# Patient Record
Sex: FEMALE | Race: WHITE | NOT HISPANIC OR LATINO | Employment: OTHER | URBAN - METROPOLITAN AREA
[De-identification: names, ages, dates, MRNs, and addresses within clinical notes are randomized per-mention and may not be internally consistent; named-entity substitution may affect disease eponyms.]

---

## 2017-04-06 ENCOUNTER — CONVERSION ENCOUNTER (OUTPATIENT)
Dept: MAMMOGRAPHY | Facility: CLINIC | Age: 77
End: 2017-04-06

## 2017-04-06 ENCOUNTER — GENERIC CONVERSION - ENCOUNTER (OUTPATIENT)
Dept: OTHER | Facility: OTHER | Age: 77
End: 2017-04-06

## 2017-04-06 DIAGNOSIS — Z12.31 ENCOUNTER FOR SCREENING MAMMOGRAM FOR MALIGNANT NEOPLASM OF BREAST: ICD-10-CM

## 2017-04-25 ENCOUNTER — GENERIC CONVERSION - ENCOUNTER (OUTPATIENT)
Dept: OTHER | Facility: OTHER | Age: 77
End: 2017-04-25

## 2017-06-02 ENCOUNTER — ALLSCRIPTS OFFICE VISIT (OUTPATIENT)
Dept: OTHER | Facility: OTHER | Age: 77
End: 2017-06-02

## 2017-06-02 DIAGNOSIS — R92.8 OTHER ABNORMAL AND INCONCLUSIVE FINDINGS ON DIAGNOSTIC IMAGING OF BREAST: ICD-10-CM

## 2017-06-02 DIAGNOSIS — E04.1 NONTOXIC SINGLE THYROID NODULE: ICD-10-CM

## 2017-06-02 DIAGNOSIS — Z12.31 ENCOUNTER FOR SCREENING MAMMOGRAM FOR MALIGNANT NEOPLASM OF BREAST: ICD-10-CM

## 2017-06-07 ENCOUNTER — GENERIC CONVERSION - ENCOUNTER (OUTPATIENT)
Dept: OTHER | Facility: OTHER | Age: 77
End: 2017-06-07

## 2017-10-16 ENCOUNTER — ALLSCRIPTS OFFICE VISIT (OUTPATIENT)
Dept: OTHER | Facility: OTHER | Age: 77
End: 2017-10-16

## 2017-10-17 NOTE — PROGRESS NOTES
Assessment  1  Primary osteoarthritis of both first carpometacarpal joints (715 14) (M18 0)    Plan  Primary osteoarthritis of both first carpometacarpal joints    · Follow-up visit in 3 months Evaluation and Treatment  Follow-up  Status: Hold For -  Scheduling  Requested for: 16ESG2040   · You may continue or resume your normal level of activity ; Status:Complete;   Done:  03WXP1134 02:10PM   · Joint Bursa Aspiration &/or Injection Small - POC; Status:Complete;   Done: 19ZRR2263  02:10PM   · Joint Bursa Aspiration &/or Injection Small - POC; Status:Complete;   Done: 50VIY3999  02:10PM    Discussion/Summary    Assessment: bilateral thumb CMC arthritis  Bilateral thumb CMC joint osteoarthritis  anatomy and physiology of carpometacarpal joint arthritis was discussed with the patient today in the office  Deterioration of the articular cartilage eventually leads to hypermobility at the thumb [facility] joint, resulting in joint subluxation, osteophyte formation, cystic changes within the trapezium and base of the first metacarpal, as well as subchondral sclerosis  Eventually, pain, limited mobility, and compensatory hyperextension at the metacarpophalangeal joint may develop  While normal activity and usage of the thumb joint may provide a painful experience to the patient, this typically does not result in damage to the thumb or hand  Treatment options include resting thumb spica splints to decreased joint edema, pain, and inflammation  Therapy exercises to strengthen the thenar musculature may relieve pain, but do not alter the overall continued development of osteoarthritis  Oral medications, topical medications, corticosteroid injections may decrease pain and increase overall function  Eventually, approximately 5% of patients may require surgical intervention  Patient has elected injection today  It was performed today without complication  Continue conservative management     the patient, site, and procedure were identified and after discussion of risks and benefits, verbal consent was obtained  The [site] was prepped in a sterile fashion and injected with a combination of 1% lidocaine without epinephrine and 6 mg of Celestone  Sterile dressings were then applied  The patient tolerated the procedure well         note was dictated with dictation software  As such, and may contain typographical errors, improperly dictated words, background noise, or other errors  patient presents evaluation bilateral thumb CMC joint osteoarthritis mild to moderate in severity with pain made worse with activity gripping twisting turning  No fevers chills constitutional symptoms  She has received previous cortisone injections in the past and they have helped  past medical history, surgical history, family history, social history, medications, allergies, and review of systems have been read and reviewed on the chart and have been updated  Review of Systems was recorded in the office today on a separate evaluation sheet and is listed below  of Systems:NegativeNegativeNegativeNegativeNegativeNegativeNegative except as aboveAs aboveNegative except as aboveNegativeNegative    / Psych: Awake and Oriented, No acute distress, age appropriateNormocephalic, atraumatic, mucous membranes moist, neck supple, trachea midline  No rebound or signs of guardingNo discernible arrhythmia, 2+ pulses with good capillary refillNo audible wheezing or audible stridor[The patient is neurovascularly intact in the median, ulnar, and radial nerve distribution  There is normal sensation and good capillary refill within the digits  2+ pulses  ][No lesions, rashes, streaking, purulence, abscesses, lymphangitis]Positive shock, grind, circumduction test  Tenderness to palpation over the thumb CMC joint  Minimal hyperextension metacarpophalangeal joint  Studies: [none]         Chief Complaint  1  Hand Problem    Active Problems  1  Abnormal mammogram (793 80) (R92 8)   2   Breast self examination education, encounter for (V65 49) (Z71 89)   3  Encounter for screening mammogram for malignant neoplasm of breast (V76 12)   (Z12 31)   4  Glaucoma (365 9) (H40 9)   5  Menopausal state (627 2) (N95 1)   6  Menopausal symptoms (627 2) (N95 1)   7  Thyroid nodule (241 0) (E04 1)    Past Medical History   · Breast self examination education, encounter for (V65 49) (Z71 89)   · History of Summary Of Previous Pregnancies  4  (Total No )   · History of Summary Of Previous Pregnancies Para 3  (Deliveries)    Surgical History   · History of Breast Surgery Enlargement Procedure   · History of Cervix Cryosurgery   · History of Colposcopy Cervix With Biopsy(S) With Endocervical Curettage   · History of Corneal LASIK   · History of Dilation And Curettage   · History of Endometrial Biopsy By Suction   · History of Endometrial Biopsy By Suction   · History of Foot Surgery   · History of Hysteroscopy With Resection For Intrauterine Polyp Removal   · History of Tonsillectomy    Family History   · No pertinent family history   · Family history of Hypertension (V17 49)   · Family history of Arthritis    Social History   · Denied: History of Alcohol use   · Caffeine Use   · Denied: History of Drug Use   · Marital History - Currently    · Never A Smoker   · [employer] Aff[employer]   · Three children   · Uses Safety Equipment - Seatbelts    Current Meds   1  Alpha Lipoic Acid 300 MG CAPS; Therapy: (Recorded:2015) to Recorded   2  CoQ-10 CAPS; Therapy: (Recorded:17Csk5381) to Recorded   3  Lexapro 5 MG Oral Tablet (Escitalopram Oxalate); Therapy: (Recorded:2017) to Recorded   4  Turmeric Curcumin Oral Capsule; Therapy: (Recorded:2017) to Recorded   5  Vitamin C TABS; Therapy: (Recorded:64Vvf8404) to Recorded   6  Vitamin D-3 TABS; Therapy: (Recorded:75Wew7610) to Recorded    Allergies  1   No Known Drug Allergies    Vitals  Signs   Systolic: 032  Diastolic: 72  Height: 5 ft 2 5 in  Weight: 117 lb 6 oz  BMI Calculated: 21 13  BSA Calculated: 1 53    Signatures   Electronically signed by : CHINEDU Robles ; Oct 16 2017  2:10PM EST                       (Author)

## 2017-12-05 ENCOUNTER — CONVERSION ENCOUNTER (OUTPATIENT)
Dept: MAMMOGRAPHY | Facility: CLINIC | Age: 77
End: 2017-12-05

## 2017-12-05 ENCOUNTER — GENERIC CONVERSION - ENCOUNTER (OUTPATIENT)
Dept: OBGYN CLINIC | Facility: CLINIC | Age: 77
End: 2017-12-05

## 2017-12-08 ENCOUNTER — GENERIC CONVERSION - ENCOUNTER (OUTPATIENT)
Dept: OTHER | Facility: OTHER | Age: 77
End: 2017-12-08

## 2018-01-10 NOTE — CONSULTS
Chief Complaint    1  Hand Problem    Active Problems    1  Abnormal mammogram (793 80) (R92 8)   2  Breast self examination education, encounter for (V65 49) (Z71 89)   3  Encounter for screening mammogram for malignant neoplasm of breast (V76 12)   (Z12 31)   4  Glaucoma (365 9) (H40 9)   5  Menopausal state (627 2) (N95 1)   6  Menopausal symptoms (627 2) (N95 1)   7  Thyroid nodule (241 0) (E04 1)    Past Medical History    · Breast self examination education, encounter for (V65 49) (Z71 89)   · History of Summary Of Previous Pregnancies  4  (Total No )   · History of Summary Of Previous Pregnancies Para 3  (Deliveries)    Surgical History    · History of Breast Surgery Enlargement Procedure   · History of Cervix Cryosurgery   · History of Colposcopy Cervix With Biopsy(S) With Endocervical Curettage   · History of Corneal LASIK   · History of Dilation And Curettage   · History of Endometrial Biopsy By Suction   · History of Endometrial Biopsy By Suction   · History of Foot Surgery   · History of Hysteroscopy With Resection For Intrauterine Polyp Removal   · History of Tonsillectomy    Family History    · No pertinent family history    · Family history of Hypertension (V17 49)    · Family history of Arthritis    Social History    · Denied: History of Alcohol use   · Caffeine Use   · Denied: History of Drug Use   · Marital History - Currently    · Never A Smoker   · Evangel[employer] Affiliation [employer]   · Three children   · Uses Safety Equipment - Seatbelts    Current Meds   1  Alpha Lipoic Acid 300 MG CAPS; Therapy: (Recorded:2015) to Recorded   2  CoQ-10 CAPS; Therapy: (Recorded:35Fhd8092) to Recorded   3  Lexapro 5 MG Oral Tablet (Escitalopram Oxalate); Therapy: (Recorded:2017) to Recorded   4  Turmeric Curcumin Oral Capsule; Therapy: (Recorded:2017) to Recorded   5  Vitamin C TABS; Therapy: (Recorded:47Clu7210) to Recorded   6  Vitamin D-3 TABS;    Therapy: (Recorded:60Jat6167) to Recorded    Allergies    1  No Known Drug Allergies    Vitals  Signs    Systolic: 251JCCWWKFOD: 89PTPTOI: 5 ft 2 5 inWeight: 117 lb 6 ozBMI Calculated: 21 13BSA Calculated: 1 53    Assessment    1  Primary osteoarthritis of both first carpometacarpal joints (715 14) (M18 0)    Plan     Primary osteoarthritis of both first carpometacarpal joints    · Follow-up visit in 3 months Evaluation and Treatment  Follow-up  Status: Hold For -  Scheduling  Requested for: 32TKM2608   · You may continue or resume your normal level of activity ; Status:Complete;   Done:  19PUH7779 02:10PM   · Joint Bursa Aspiration &/or Injection Small - POC; Status:Complete;   Done: 13LGP1117  02:10PM   · Joint Bursa Aspiration &/or Injection Small - POC; Status:Complete;   Done: 73VKB6823  02:10PM    Discussion/Summary    Assessment: bilateral thumb CMC arthritis    Plan:   Assessment: Bilateral thumb CMC joint osteoarthritis    Plan:   The anatomy and physiology of carpometacarpal joint arthritis was discussed with the patient today in the office  Deterioration of the articular cartilage eventually leads to hypermobility at the thumb [facility] joint, resulting in joint subluxation, osteophyte formation, cystic changes within the trapezium and base of the first metacarpal, as well as subchondral sclerosis  Eventually, pain, limited mobility, and compensatory hyperextension at the metacarpophalangeal joint may develop  While normal activity and usage of the thumb joint may provide a painful experience to the patient, this typically does not result in damage to the thumb or hand  Treatment options include resting thumb spica splints to decreased joint edema, pain, and inflammation  Therapy exercises to strengthen the thenar musculature may relieve pain, but do not alter the overall continued development of osteoarthritis   Oral medications, topical medications, corticosteroid injections may decrease pain and increase overall function  Eventually, approximately 5% of patients may require surgical intervention  Patient has elected injection today  It was performed today without complication  Continue conservative management  After the patient, site, and procedure were identified and after discussion of risks and benefits, verbal consent was obtained  The [site] was prepped in a sterile fashion and injected with a combination of 1% lidocaine without epinephrine and 6 mg of Celestone  Sterile dressings were then applied  The patient tolerated the procedure well  This note was dictated with dictation software  As such, and may contain typographical errors, improperly dictated words, background noise, or other errors  HPI:  The patient presents evaluation bilateral thumb CMC joint osteoarthritis mild to moderate in severity with pain made worse with activity gripping twisting turning  No fevers chills constitutional symptoms  She has received previous cortisone injections in the past and they have helped  The past medical history, surgical history, family history, social history, medications, allergies, and review of systems have been read and reviewed on the chart and have been updated  Review of Systems was recorded in the office today on a separate evaluation sheet and is listed below  Review of Systems:  Constitutional: Negative  HEENT: Negative  Cardiovascular: Negative  Pulmonary: Negative  : Negative  GI: Negative  Skin: Negative except as above  Musculoskeletal: As above  Neurologic: Negative except as above  Endocrine: Negative  Psychiatric: Negative    Examination:    General / Psych: Awake and Oriented, No acute distress, age appropriate  HEENT: Normocephalic, atraumatic, mucous membranes moist, neck supple, trachea midline    Abdomen: No rebound or signs of guarding  Cardio: No discernible arrhythmia, 2+ pulses with good capillary refill  Pulmonary: No audible wheezing or audible stridor  Neurologic: Yudith Vargas patient is neurovascularly intact in the median, ulnar, and radial nerve distribution  There is normal sensation and good capillary refill within the digits  2+ pulses ]  Skin: [No lesions, rashes, streaking, purulence, abscesses, lymphangitis]  Musculoskeletal: Positive shock, grind, circumduction test  Tenderness to palpation over the thumb CMC joint   Minimal hyperextension metacarpophalangeal joint    Diagnostic Studies: [none]         Signatures   Electronically signed by : CHINEDU Garza ; Oct 16 2017  2:10PM EST                       (Author)

## 2018-01-10 NOTE — MISCELLANEOUS
Message   Recorded as Task   Date: 04/19/2016 12:31 PM, Created By: Bertin Huddleston   Task Name: Review Document   Assigned To: [facility]   Regarding Patient: Marlin Eid, Status: Active   Comment:    Bertin Flaquito - 19 Apr 2016 12:31 PM     TASK CREATED  labs are good, she does have borderline total cholesterol but her HDL is high, no treatment needed        Active Problems    1  Breast self examination education, encounter for (V65 49) (Z71 89)   2  Encounter for screening mammogram for malignant neoplasm of breast (V76 12)   (Z12 31)   3  Menopausal state (627 2) (N95 1)   4  Menopausal symptoms (627 2) (N95 1)    Current Meds   1  Alpha Lipoic Acid 300 MG CAPS; Therapy: (Recorded:02Mar2015) to Recorded   2  CoQ-10 CAPS; Therapy: (Recorded:77Jhk4205) to Recorded   3  [address] [facility]; Therapy: (Recorded:27Feb2014) to Recorded   4  Prempro 0 3-1 5 MG Oral Tablet; TAKE 1 TABLET DAILY AS DIRECTED; Therapy: 52UYV1541 to (Evaluate:27Mar2017)  Requested for: 01Apr2016; Last   Rx:01Apr2016 Ordered   5  Vitamin C TABS; Therapy: (Recorded:27Feb2014) to Recorded   6  Vitamin D-3 TABS; Therapy: (Recorded:27Feb2014) to Recorded    Allergies    1   No Known Drug Allergies    Signatures   Electronically signed by : [facility], ; Apr 20 2016  9:50AM EST                       (Author)

## 2018-01-11 NOTE — CONSULTS
Chief Complaint    1  Hand Problem    Active Problems    1  Abnormal mammogram (793 80) (R92 8)   2  Breast self examination education, encounter for (V65 49) (Z71 89)   3  Encounter for screening mammogram for malignant neoplasm of breast (V76 12)   (Z12 31)   4  Glaucoma (365 9) (H40 9)   5  Menopausal state (627 2) (N95 1)   6  Menopausal symptoms (627 2) (N95 1)   7  Thyroid nodule (241 0) (E04 1)    Past Medical History    1  Breast self examination education, encounter for (V65 49) (Z71 89)   2  History of Summary Of Previous Pregnancies  4  (Total No )   3  History of Summary Of Previous Pregnancies Para 3  (Deliveries)    Surgical History    1  History of Breast Surgery Enlargement Procedure   2  History of Cervix Cryosurgery   3  History of Colposcopy Cervix With Biopsy(S) With Endocervical Curettage   4  History of Corneal LASIK   5  History of Dilation And Curettage   6  History of Endometrial Biopsy By Suction   7  History of Endometrial Biopsy By Suction   8  History of Foot Surgery   9  History of Hysteroscopy With Resection For Intrauterine Polyp Removal   10  History of Tonsillectomy    Family History    1  No pertinent family history    2  Family history of Hypertension (V17 49)    3  Family history of Arthritis    Social History    · Denied: History of Alcohol use   · Caffeine Use   · Denied: History of Drug Use   · Marital History - Currently    · Never A Smoker   · [employer] Affiliation [employer]   · Three children   · Uses Safety Equipment - Seatbelts    Current Meds   1  Alpha Lipoic Acid 300 MG CAPS; Therapy: (Recorded:2015) to Recorded   2  CoQ-10 CAPS; Therapy: (Recorded:40Jie1642) to Recorded   3  Lexapro 5 MG Oral Tablet (Escitalopram Oxalate); Therapy: (Recorded:2017) to Recorded   4  Turmeric Curcumin Oral Capsule; Therapy: (Recorded:2017) to Recorded   5  Vitamin C TABS; Therapy: (Recorded:46Ygv4984) to Recorded   6   Vitamin D-3 TABS; Therapy: (Recorded:97Ywi0181) to Recorded    Allergies    1  No Known Drug Allergies    Vitals  Signs   Recorded: 82QYB1937 42:63UI   Systolic: 150  Diastolic: 72  Height: 5 ft 2 5 in  Weight: 117 lb 6 oz  BMI Calculated: 21 13  BSA Calculated: 1 53    Assessment    1  Primary osteoarthritis of both first carpometacarpal joints (715 14) (M18 0)    Plan  Primary osteoarthritis of both first carpometacarpal joints    1  Follow-up visit in 3 months Evaluation and Treatment  Follow-up  Status: Hold For -   Scheduling  Requested for: 48XKA6246   2  You may continue or resume your normal level of activity ; Status:Complete;   Done:   34OHB3132 02:10PM   3  Joint Bursa Aspiration &/or Injection Small - POC; Status:Complete;   Done: 00IDP5773   02:10PM   4  Joint Bursa Aspiration &/or Injection Small - POC; Status:Complete;   Done: 19NCU1468   02:10PM    Discussion/Summary  Discussion Summary:   Assessment: bilateral thumb CMC arthritis    Plan:   Assessment: Bilateral thumb CMC joint osteoarthritis    Plan:   The anatomy and physiology of carpometacarpal joint arthritis was discussed with the patient today in the office  Deterioration of the articular cartilage eventually leads to hypermobility at the thumb [facility] joint, resulting in joint subluxation, osteophyte formation, cystic changes within the trapezium and base of the first metacarpal, as well as subchondral sclerosis  Eventually, pain, limited mobility, and compensatory hyperextension at the metacarpophalangeal joint may develop  While normal activity and usage of the thumb joint may provide a painful experience to the patient, this typically does not result in damage to the thumb or hand  Treatment options include resting thumb spica splints to decreased joint edema, pain, and inflammation  Therapy exercises to strengthen the thenar musculature may relieve pain, but do not alter the overall continued development of osteoarthritis   Oral medications, topical medications, corticosteroid injections may decrease pain and increase overall function  Eventually, approximately 5% of patients may require surgical intervention  Patient has elected injection today  It was performed today without complication  Continue conservative management  After the patient, site, and procedure were identified and after discussion of risks and benefits, verbal consent was obtained  The [site] was prepped in a sterile fashion and injected with a combination of 1% lidocaine without epinephrine and 6 mg of Celestone  Sterile dressings were then applied  The patient tolerated the procedure well  This note was dictated with dictation software  As such, and may contain typographical errors, improperly dictated words, background noise, or other errors  HPI:  The patient presents evaluation bilateral thumb CMC joint osteoarthritis mild to moderate in severity with pain made worse with activity gripping twisting turning  No fevers chills constitutional symptoms  She has received previous cortisone injections in the past and they have helped  The past medical history, surgical history, family history, social history, medications, allergies, and review of systems have been read and reviewed on the chart and have been updated  Review of Systems was recorded in the office today on a separate evaluation sheet and is listed below  Review of Systems:  Constitutional: Negative  HEENT: Negative  Cardiovascular: Negative  Pulmonary: Negative  : Negative  GI: Negative  Skin: Negative except as above  Musculoskeletal: As above  Neurologic: Negative except as above  Endocrine: Negative  Psychiatric: Negative    Examination:    General / Psych: Awake and Oriented, No acute distress, age appropriate  HEENT: Normocephalic, atraumatic, mucous membranes moist, neck supple, trachea midline    Abdomen: No rebound or signs of guarding  Cardio: No discernible arrhythmia, 2+ pulses with good capillary refill  Pulmonary: No audible wheezing or audible stridor  Neurologic: [The patient is neurovascularly intact in the median, ulnar, and radial nerve distribution  There is normal sensation and good capillary refill within the digits  2+ pulses ]  Skin: [No lesions, rashes, streaking, purulence, abscesses, lymphangitis]  Musculoskeletal: Positive shock, grind, circumduction test  Tenderness to palpation over the thumb CMC joint   Minimal hyperextension metacarpophalangeal joint    Diagnostic Studies: [none]         Signatures   Electronically signed by : Glendale Apley, M D ; Oct 16 2017  2:11PM EST                       (Author)

## 2018-01-12 VITALS
SYSTOLIC BLOOD PRESSURE: 118 MMHG | BODY MASS INDEX: 20.93 KG/M2 | WEIGHT: 118.13 LBS | DIASTOLIC BLOOD PRESSURE: 62 MMHG | HEIGHT: 63 IN

## 2018-01-12 NOTE — MISCELLANEOUS
Message   Recorded as Task   Date: 04/10/2017 03:10 PM, Created By: Laurie Regalado   Task Name: Review Document   Assigned To: Laurie Regalado   Regarding Patient: Timo Luz, Status: In Progress   Comment:    Laurie Regalado - 10 Apr 2017 3:10 PM     TASK CREATED  pt needs additional views and US of the left breast   LalaEarlene - 11 Apr 2017 1:06 PM     TASK IN PROGRESS   LalaEarlene weston - 12 Apr 2017 9:42 AM     TASK REPLIED TO: Previously Assigned To Reese Sutton  pt has an appt 4/25/17  Jeanine Lightning Active Problems    1  Breast self examination education, encounter for (V65 49) (Z71 89)   2  Encounter for screening mammogram for malignant neoplasm of breast (V76 12)   (Z12 31)   3  Menopausal state (627 2) (N95 1)   4  Menopausal symptoms (627 2) (N95 1)    Current Meds   1  Alpha Lipoic Acid 300 MG CAPS; Therapy: (Recorded:02Mar2015) to Recorded   2  CoQ-10 CAPS; Therapy: (Recorded:36Tpr9992) to Recorded   3  [address]; Therapy: (Recorded:17Opd6687) to Recorded   4  Vitamin C TABS; Therapy: (Recorded:35Lxz5179) to Recorded   5  Vitamin D-3 TABS; Therapy: (Recorded:91Itz2923) to Recorded    Allergies    1  No Known Drug Allergies    Plan  Encounter for screening mammogram for malignant neoplasm of breast    · * MAMMO SCREENING BILATERAL W CAD; Status:Active;  Requested EQV:35GCQ2273;     Signatures   Electronically signed by : Deyanira Duarte, ; Apr 12 2017  9:42AM EST                       (Author)

## 2018-01-14 VITALS
WEIGHT: 117.38 LBS | DIASTOLIC BLOOD PRESSURE: 72 MMHG | HEIGHT: 63 IN | SYSTOLIC BLOOD PRESSURE: 112 MMHG | BODY MASS INDEX: 20.8 KG/M2

## 2018-01-15 NOTE — MISCELLANEOUS
Message   Recorded as Task   Date: 06/06/2017 09:35 PM, Created By: Kelsy Hernandez   Task Name: Review Document   Assigned To: Jerod Palacio   Regarding Patient: Hany Guerrero, Status: Active   Comment:    Kelsy Hernandez - 06 Jun 2017 9:35 PM     TASK CREATED  PT needs a dx mammogram of the left in 6 months, I think she has a rx for this but can you double check? Earlene Reeves - 07 Jun 2017 10:50 AM     TASK EDITED  mailed pt a script for her follow-up  [facility] Active Problems    1  Abnormal mammogram (793 80) (R92 8)   2  Breast self examination education, encounter for (V65 49) (Z71 89)   3  Encounter for screening mammogram for malignant neoplasm of breast (V76 12)   (Z12 31)   4  Glaucoma (365 9) (H40 9)   5  Menopausal state (627 2) (N95 1)   6  Menopausal symptoms (627 2) (N95 1)   7  Thyroid nodule (241 0) (E04 1)    Current Meds   1  Alpha Lipoic Acid 300 MG CAPS; Therapy: (Recorded:02Mar2015) to Recorded   2  CoQ-10 CAPS; Therapy: (Recorded:29Grf9717) to Recorded   3  Lexapro 5 MG Oral Tablet (Escitalopram Oxalate); Therapy: (Recorded:02Jun2017) to Recorded   4  Turmeric Curcumin Oral Capsule; Therapy: (Recorded:02Jun2017) to Recorded   5  Vitamin C TABS; Therapy: (Recorded:55Lgf0773) to Recorded   6  Vitamin D-3 TABS; Therapy: (Recorded:10Zlw6654) to Recorded    Allergies    1  No Known Drug Allergies    Plan  Abnormal mammogram    · MAMMO DIAGNOSTIC LEFT W CAD; Status:Hold For - Scheduling,Retrospective  Authorization;  Requested for:80Vzk7325;     Signatures   Electronically signed by : Brad Camargo, ; Jun 7 2017 10:50AM EST                       (Author)

## 2018-01-23 NOTE — MISCELLANEOUS
Message   Recorded as Task   Date: 12/06/2017 09:27 PM, Created By: Michelle Hand   Task Name: Review Document   Assigned To: Daniela Rocha   Regarding Patient: Markos Santana, Status: Active   CommentAlto Severs - 06 Dec 2017 9:27 PM     TASK CREATED  b/l dx mammogra in 5 months   Order mailed to patient  Active Problems    1  Abnormal mammogram (793 80) (R92 8)   2  Breast self examination education, encounter for (V65 49) (Z71 89)   3  Encounter for screening mammogram for malignant neoplasm of breast (V76 12)   (Z12 31)   4  Glaucoma (365 9) (H40 9)   5  Menopausal state (627 2) (N95 1)   6  Menopausal symptoms (627 2) (N95 1)   7  Primary osteoarthritis of both first carpometacarpal joints (715 14) (M18 0)   8  Thyroid nodule (241 0) (E04 1)    Current Meds   1  Alpha Lipoic Acid 300 MG CAPS; Therapy: (Recorded:02Mar2015) to Recorded   2  CoQ-10 CAPS; Therapy: (Recorded:78Hqx8758) to Recorded   3  Lexapro 5 MG Oral Tablet (Escitalopram Oxalate); Therapy: (Recorded:02Jun2017) to Recorded   4  Turmeric Curcumin Oral Capsule; Therapy: (Recorded:02Jun2017) to Recorded   5  Vitamin C TABS; Therapy: (Recorded:02Wlg0590) to Recorded   6  Vitamin D-3 TABS; Therapy: (Recorded:76Ijf6631) to Recorded    Allergies    1  No Known Drug Allergies    Plan  Abnormal mammogram    · MAMMO DIAGNOSTIC BILATERAL W CAD; Status:Hold For - Scheduling,Retrospective  Authorization;  Requested for:77Zdg0201;     Signatures   Electronically signed by : Frank Bailey, ; Dec  8 2017  9:19AM EST                       (Author)

## 2018-04-23 ENCOUNTER — OFFICE VISIT (OUTPATIENT)
Dept: OBGYN CLINIC | Facility: CLINIC | Age: 78
End: 2018-04-23
Payer: MEDICARE

## 2018-04-23 VITALS
DIASTOLIC BLOOD PRESSURE: 70 MMHG | HEIGHT: 63 IN | SYSTOLIC BLOOD PRESSURE: 107 MMHG | BODY MASS INDEX: 20.41 KG/M2 | WEIGHT: 115.2 LBS | HEART RATE: 85 BPM

## 2018-04-23 DIAGNOSIS — M18.0 PRIMARY OSTEOARTHRITIS OF BOTH FIRST CARPOMETACARPAL JOINTS: Primary | ICD-10-CM

## 2018-04-23 PROCEDURE — 99213 OFFICE O/P EST LOW 20 MIN: CPT | Performed by: ORTHOPAEDIC SURGERY

## 2018-04-23 PROCEDURE — 20600 DRAIN/INJ JOINT/BURSA W/O US: CPT | Performed by: ORTHOPAEDIC SURGERY

## 2018-04-23 RX ORDER — PREDNISONE 10 MG/1
TABLET ORAL
Refills: 0 | COMMUNITY
Start: 2018-02-01 | End: 2018-04-23

## 2018-04-23 RX ORDER — BETAMETHASONE SODIUM PHOSPHATE AND BETAMETHASONE ACETATE 3; 3 MG/ML; MG/ML
3 INJECTION, SUSPENSION INTRA-ARTICULAR; INTRALESIONAL; INTRAMUSCULAR; SOFT TISSUE
Status: COMPLETED | OUTPATIENT
Start: 2018-04-23 | End: 2018-04-23

## 2018-04-23 RX ORDER — TIMOLOL MALEATE 2.5 MG/ML
SOLUTION OPHTHALMIC
Refills: 4 | COMMUNITY
Start: 2018-03-30

## 2018-04-23 RX ORDER — ESCITALOPRAM OXALATE 10 MG/1
10 TABLET ORAL DAILY
Refills: 1 | COMMUNITY
Start: 2018-01-26 | End: 2018-04-23

## 2018-04-23 RX ORDER — BIMATOPROST 0.01 %
DROPS OPHTHALMIC (EYE)
Refills: 4 | COMMUNITY
Start: 2018-02-16

## 2018-04-23 RX ORDER — CODEINE PHOSPHATE AND GUAIFENESIN 10; 100 MG/5ML; MG/5ML
SOLUTION ORAL
Refills: 0 | COMMUNITY
Start: 2018-02-14 | End: 2018-04-23

## 2018-04-23 RX ORDER — ESCITALOPRAM OXALATE 20 MG/1
20 TABLET ORAL DAILY
Refills: 3 | COMMUNITY
Start: 2018-03-16 | End: 2021-11-19

## 2018-04-23 RX ORDER — LIDOCAINE HYDROCHLORIDE 10 MG/ML
1 INJECTION, SOLUTION INFILTRATION; PERINEURAL
Status: COMPLETED | OUTPATIENT
Start: 2018-04-23 | End: 2018-04-23

## 2018-04-23 RX ADMIN — LIDOCAINE HYDROCHLORIDE 1 ML: 10 INJECTION, SOLUTION INFILTRATION; PERINEURAL at 15:37

## 2018-04-23 RX ADMIN — BETAMETHASONE SODIUM PHOSPHATE AND BETAMETHASONE ACETATE 3 MG: 3; 3 INJECTION, SUSPENSION INTRA-ARTICULAR; INTRALESIONAL; INTRAMUSCULAR; SOFT TISSUE at 15:37

## 2018-04-23 NOTE — PROGRESS NOTES
ASSESSMENT/PLAN:    Diagnoses and all orders for this visit:    Primary osteoarthritis of both first carpometacarpal joints  -     Small joint arthrocentesis  -     Small joint arthrocentesis    Other orders  -     LUMIGAN 0 01 % ophthalmic drops; 1 DROP INTO BOTH EYES AT BEDTIME  -     escitalopram (LEXAPRO) 20 mg tablet; Take 20 mg by mouth daily  -     Discontinue: escitalopram (LEXAPRO) 10 mg tablet; Take 10 mg by mouth daily  -     ADVAIR DISKUS 100-50 MCG/DOSE; Inhale 2 (two) times a day Rinse mouth after use  -     Discontinue: guaiFENesin-codeine (ROBITUSSIN AC) 100-10 mg/5 mL oral solution; TAKE 5 ML BY MOUTH TWICE A DAY AS NEEDED FOR COUGH  -     Discontinue: predniSONE 10 mg tablet; TAKE AS DIRECTED ON ATTACHED SHEET  -     timolol (TIMOPTIC-XE) 0 25 % ophthalmic gel-forming; INSTILL 1 DROP INTO LEFT EYE EVERY MORNING  -     TURMERIC PO; Take by mouth  -     Ascorbic Acid (VITAMIN C PO); Take by mouth  -     Cholecalciferol (VITAMIN D3 PO); Take by mouth  -     Coenzyme Q10 (COQ-10 PO); Take by mouth        Assessment:   Thumb CMC Arthritis  bilateral    Plan:   steroid injections    Follow Up:  3  month(s)    To Do Next Visit:       General Discussions:  CMC Arthritis: The anatomy and physiology of carpometacarpal joint arthritis was discussed with the patient today in the office  Deterioration of the articular cartilage eventually leads to hypermobility at the thumb [facility] joint, resulting in joint subluxation, osteophyte formation, cystic changes within the trapezium and base of the first metacarpal, as well as subchondral sclerosis  Eventually, pain, limited mobility, and compensatory hyperextension at the metacarpophalangeal joint may develop  While normal activity and usage of the thumb joint may provide a painful experience to the patient, this typically does not result in damage to the thumb or hand    Treatment options include resting thumb spica splints to decreased joint edema, pain, and inflammation  Therapy exercises to strengthen the thenar musculature may relieve pain, but do not alter the overall continued development of osteoarthritis  Oral medications, topical medications, corticosteroid injections may decrease pain and increase overall function  Eventually, approximately 5% of patients may require surgical intervention  Operative Discussions:         _____________________________________________________  CHIEF COMPLAINT:  Chief Complaint   Patient presents with    Left Thumb - Follow-up    Right Thumb - Follow-up         SUBJECTIVE:  Renuka Pierce is a 78y o  year old female who presents for follow up regarding Thumb CMC Arthritis  bilateral   Since last visit, Renuka Pierce has tried steroid injections with relief  Pt states that the injections gave her relief until 1 week ago  Today there is Pain  Mild  Constant  Sharp and Aching to the bilateral thumb  Pt states that she notices the most discomfort while working out and cooking as well as lifting plates  The pt states that she has modified her gym workouts and notices that they do not cause her as much pain in her thumbs at this point in time  Radiation: None  Associated symptoms: No Complaints    PAST MEDICAL HISTORY:  History reviewed  No pertinent past medical history  PAST SURGICAL HISTORY:  History reviewed  No pertinent surgical history      FAMILY HISTORY:  Family History   Problem Relation Age of Onset    Dementia Mother     Hypertension Father        SOCIAL HISTORY:  Social History   Substance Use Topics    Smoking status: Never Smoker    Smokeless tobacco: Never Used    Alcohol use No       MEDICATIONS:    Current Outpatient Prescriptions:     ADVAIR DISKUS 100-50 MCG/DOSE, Inhale 2 (two) times a day Rinse mouth after use, Disp: , Rfl: 3    Ascorbic Acid (VITAMIN C PO), Take by mouth, Disp: , Rfl:     Cholecalciferol (VITAMIN D3 PO), Take by mouth, Disp: , Rfl:     Coenzyme Q10 (COQ-10 PO), Take by mouth, Disp: , Rfl:     escitalopram (LEXAPRO) 20 mg tablet, Take 20 mg by mouth daily, Disp: , Rfl: 3    LUMIGAN 0 01 % ophthalmic drops, 1 DROP INTO BOTH EYES AT BEDTIME, Disp: , Rfl: 4    timolol (TIMOPTIC-XE) 0 25 % ophthalmic gel-forming, INSTILL 1 DROP INTO LEFT EYE EVERY MORNING, Disp: , Rfl: 4    TURMERIC PO, Take by mouth, Disp: , Rfl:     ALLERGIES:  No Known Allergies    REVIEW OF SYSTEMS:  Pertinent items are noted in HPI      LABS:  HgA1c: No results found for: HGBA1C  BMP: No results found for: GLUCOSE, CALCIUM, NA, K, CO2, CL, BUN, CREATININE        _____________________________________________________  PHYSICAL EXAMINATION:  General: well developed and well nourished, alert, oriented times 3 and appears comfortable  Psychiatric: Normal  HEENT: Trachea Midline, No torticollis  Cardiovascular: No discernable arrhythmia  Pulmonary: No wheezing or stridor  Skin: No masses, erthema, lacerations, fluctation, ulcerations  Neurovascular: Sensation Intact to the Median, Ulnar, Radial Nerve, Motor Intact to the Median, Ulnar, Radial Nerve and Pulses Intact    MUSCULOSKELETAL EXAMINATION:  LEFT SIDE:  CMC: Positive grind, Positive tendnerness CMC and Positive Shoulder Sign  RIGHT SIDE:  CMC: Positive grind, Positive tendnerness CMC and Positive Shoulder Sign    _____________________________________________________  STUDIES REVIEWED:  No Studies to review      PROCEDURES PERFORMED:  Small joint arthrocentesis  Date/Time: 4/23/2018 3:37 PM  Consent given by: patient  Site marked: site marked  Supporting Documentation  Indications: pain   Procedure Details  Location: thumb - R thumb CMC  Medications administered: 1 mL lidocaine 1 %; 3 mg betamethasone acetate-betamethasone sodium phosphate 6 (3-3) mg/mL    Patient tolerance: patient tolerated the procedure well with no immediate complications  Dressing:  Sterile dressing applied  Small joint arthrocentesis  Date/Time: 4/23/2018 3:37 PM  Consent given by: patient  Site marked: site marked  Supporting Documentation  Indications: pain   Procedure Details  Location: thumb - L thumb CMC  Medications administered: 1 mL lidocaine 1 %; 3 mg betamethasone acetate-betamethasone sodium phosphate 6 (3-3) mg/mL    Patient tolerance: patient tolerated the procedure well with no immediate complications  Dressing:  Sterile dressing applied          Scribe Attestation    I,:   Sandra Jane am acting as a scribe while in the presence of the attending physician :        I,:   Jeff Fisher MD personally performed the services described in this documentation    as scribed in my presence :

## 2018-05-07 DIAGNOSIS — R92.8 MAMMOGRAM ABNORMAL: Primary | ICD-10-CM

## 2018-05-08 ENCOUNTER — CONVERSION ENCOUNTER (OUTPATIENT)
Dept: MAMMOGRAPHY | Facility: CLINIC | Age: 78
End: 2018-05-08

## 2018-07-13 ENCOUNTER — APPOINTMENT (EMERGENCY)
Dept: CT IMAGING | Facility: HOSPITAL | Age: 78
DRG: 168 | End: 2018-07-13
Payer: MEDICARE

## 2018-07-13 ENCOUNTER — APPOINTMENT (EMERGENCY)
Dept: RADIOLOGY | Facility: HOSPITAL | Age: 78
DRG: 168 | End: 2018-07-13
Payer: MEDICARE

## 2018-07-13 ENCOUNTER — HOSPITAL ENCOUNTER (INPATIENT)
Facility: HOSPITAL | Age: 78
LOS: 3 days | Discharge: HOME | DRG: 168 | End: 2018-07-16
Attending: EMERGENCY MEDICINE | Admitting: INTERNAL MEDICINE
Payer: MEDICARE

## 2018-07-13 DIAGNOSIS — R52 PAIN: ICD-10-CM

## 2018-07-13 DIAGNOSIS — J18.9 PNEUMONIA: Primary | ICD-10-CM

## 2018-07-13 DIAGNOSIS — J18.9 PNEUMONIA OF RIGHT UPPER LOBE DUE TO INFECTIOUS ORGANISM: ICD-10-CM

## 2018-07-13 DIAGNOSIS — T17.500A MUCUS PLUGGING OF BRONCHI: ICD-10-CM

## 2018-07-13 DIAGNOSIS — A31.9 MYCOBACTERIUM INFECTION: ICD-10-CM

## 2018-07-13 DIAGNOSIS — J98.4 CAVITARY LESION OF LUNG: ICD-10-CM

## 2018-07-13 PROBLEM — R06.02 SHORTNESS OF BREATH: Status: ACTIVE | Noted: 2018-07-13

## 2018-07-13 LAB
ALBUMIN SERPL BCP-MCNC: 3.4 G/DL (ref 3.5–5)
ALP SERPL-CCNC: 88 U/L (ref 46–116)
ALT SERPL W P-5'-P-CCNC: 28 U/L (ref 12–78)
ANION GAP SERPL CALCULATED.3IONS-SCNC: 4 MMOL/L (ref 4–13)
ANION GAP SERPL CALCULATED.3IONS-SCNC: 5 MMOL/L (ref 4–13)
APTT PPP: 30 SECONDS (ref 24–36)
AST SERPL W P-5'-P-CCNC: 20 U/L (ref 5–45)
BASE EX.OXY STD BLDV CALC-SCNC: 83 % (ref 60–80)
BASE EXCESS BLDV CALC-SCNC: 2.8 MMOL/L
BASOPHILS # BLD AUTO: 0.01 THOUSANDS/ΜL (ref 0–0.1)
BASOPHILS # BLD AUTO: 0.02 THOUSANDS/ΜL (ref 0–0.1)
BASOPHILS NFR BLD AUTO: 0 % (ref 0–1)
BASOPHILS NFR BLD AUTO: 0 % (ref 0–1)
BILIRUB SERPL-MCNC: 0.2 MG/DL (ref 0.2–1)
BUN SERPL-MCNC: 12 MG/DL (ref 5–25)
BUN SERPL-MCNC: 18 MG/DL (ref 5–25)
CALCIUM SERPL-MCNC: 9 MG/DL (ref 8.3–10.1)
CALCIUM SERPL-MCNC: 9.6 MG/DL (ref 8.3–10.1)
CHLORIDE SERPL-SCNC: 102 MMOL/L (ref 100–108)
CHLORIDE SERPL-SCNC: 105 MMOL/L (ref 100–108)
CO2 SERPL-SCNC: 29 MMOL/L (ref 21–32)
CO2 SERPL-SCNC: 30 MMOL/L (ref 21–32)
CREAT SERPL-MCNC: 0.68 MG/DL (ref 0.6–1.3)
CREAT SERPL-MCNC: 0.71 MG/DL (ref 0.6–1.3)
DEPRECATED D DIMER PPP: 842 NG/ML (FEU) (ref 0–424)
EOSINOPHIL # BLD AUTO: 0.01 THOUSAND/ΜL (ref 0–0.61)
EOSINOPHIL # BLD AUTO: 0.09 THOUSAND/ΜL (ref 0–0.61)
EOSINOPHIL NFR BLD AUTO: 0 % (ref 0–6)
EOSINOPHIL NFR BLD AUTO: 1 % (ref 0–6)
ERYTHROCYTE [DISTWIDTH] IN BLOOD BY AUTOMATED COUNT: 13.2 % (ref 11.6–15.1)
ERYTHROCYTE [DISTWIDTH] IN BLOOD BY AUTOMATED COUNT: 13.3 % (ref 11.6–15.1)
GFR SERPL CREATININE-BSD FRML MDRD: 83 ML/MIN/1.73SQ M
GFR SERPL CREATININE-BSD FRML MDRD: 85 ML/MIN/1.73SQ M
GLUCOSE SERPL-MCNC: 124 MG/DL (ref 65–140)
GLUCOSE SERPL-MCNC: 91 MG/DL (ref 65–140)
HCO3 BLDV-SCNC: 27.5 MMOL/L (ref 24–30)
HCT VFR BLD AUTO: 34.7 % (ref 34.8–46.1)
HCT VFR BLD AUTO: 37.6 % (ref 34.8–46.1)
HGB BLD-MCNC: 11.1 G/DL (ref 11.5–15.4)
HGB BLD-MCNC: 12 G/DL (ref 11.5–15.4)
INR PPP: 1.01 (ref 0.86–1.17)
L PNEUMO1 AG UR QL IA.RAPID: NEGATIVE
LACTATE SERPL-SCNC: 0.8 MMOL/L (ref 0.5–2)
LYMPHOCYTES # BLD AUTO: 5 THOUSANDS/ΜL (ref 0.6–4.47)
LYMPHOCYTES # BLD AUTO: 5.5 THOUSANDS/ΜL (ref 0.6–4.47)
LYMPHOCYTES NFR BLD AUTO: 37 % (ref 14–44)
LYMPHOCYTES NFR BLD AUTO: 44 % (ref 14–44)
MCH RBC QN AUTO: 31.6 PG (ref 26.8–34.3)
MCH RBC QN AUTO: 31.7 PG (ref 26.8–34.3)
MCHC RBC AUTO-ENTMCNC: 31.9 G/DL (ref 31.4–37.4)
MCHC RBC AUTO-ENTMCNC: 32 G/DL (ref 31.4–37.4)
MCV RBC AUTO: 99 FL (ref 82–98)
MCV RBC AUTO: 99 FL (ref 82–98)
MONOCYTES # BLD AUTO: 0.66 THOUSAND/ΜL (ref 0.17–1.22)
MONOCYTES # BLD AUTO: 0.81 THOUSAND/ΜL (ref 0.17–1.22)
MONOCYTES NFR BLD AUTO: 5 % (ref 4–12)
MONOCYTES NFR BLD AUTO: 6 % (ref 4–12)
NEUTROPHILS # BLD AUTO: 6.39 THOUSANDS/ΜL (ref 1.85–7.62)
NEUTROPHILS # BLD AUTO: 7.77 THOUSANDS/ΜL (ref 1.85–7.62)
NEUTS SEG NFR BLD AUTO: 51 % (ref 43–75)
NEUTS SEG NFR BLD AUTO: 57 % (ref 43–75)
NT-PROBNP SERPL-MCNC: 262 PG/ML
O2 CT BLDV-SCNC: 14.9 ML/DL
PCO2 BLDV: 42.7 MM HG (ref 42–50)
PH BLDV: 7.43 [PH] (ref 7.3–7.4)
PLATELET # BLD AUTO: 240 THOUSANDS/UL (ref 149–390)
PLATELET # BLD AUTO: 266 THOUSANDS/UL (ref 149–390)
PMV BLD AUTO: 10.9 FL (ref 8.9–12.7)
PMV BLD AUTO: 11 FL (ref 8.9–12.7)
PO2 BLDV: 47.3 MM HG (ref 35–45)
POTASSIUM SERPL-SCNC: 3.7 MMOL/L (ref 3.5–5.3)
POTASSIUM SERPL-SCNC: 3.9 MMOL/L (ref 3.5–5.3)
PROCALCITONIN SERPL-MCNC: <0.05 NG/ML
PROT SERPL-MCNC: 7.5 G/DL (ref 6.4–8.2)
PROTHROMBIN TIME: 13 SECONDS (ref 11.8–14.2)
RBC # BLD AUTO: 3.5 MILLION/UL (ref 3.81–5.12)
RBC # BLD AUTO: 3.8 MILLION/UL (ref 3.81–5.12)
S PNEUM AG UR QL: NEGATIVE
SODIUM SERPL-SCNC: 137 MMOL/L (ref 136–145)
SODIUM SERPL-SCNC: 138 MMOL/L (ref 136–145)
TROPONIN I SERPL-MCNC: <0.02 NG/ML
WBC # BLD AUTO: 12.57 THOUSAND/UL (ref 4.31–10.16)
WBC # BLD AUTO: 13.69 THOUSAND/UL (ref 4.31–10.16)

## 2018-07-13 PROCEDURE — 83605 ASSAY OF LACTIC ACID: CPT | Performed by: EMERGENCY MEDICINE

## 2018-07-13 PROCEDURE — 85025 COMPLETE CBC W/AUTO DIFF WBC: CPT | Performed by: PHYSICIAN ASSISTANT

## 2018-07-13 PROCEDURE — 85379 FIBRIN DEGRADATION QUANT: CPT | Performed by: EMERGENCY MEDICINE

## 2018-07-13 PROCEDURE — 71046 X-RAY EXAM CHEST 2 VIEWS: CPT

## 2018-07-13 PROCEDURE — 80053 COMPREHEN METABOLIC PANEL: CPT | Performed by: EMERGENCY MEDICINE

## 2018-07-13 PROCEDURE — 82805 BLOOD GASES W/O2 SATURATION: CPT | Performed by: EMERGENCY MEDICINE

## 2018-07-13 PROCEDURE — 87205 SMEAR GRAM STAIN: CPT | Performed by: PHYSICIAN ASSISTANT

## 2018-07-13 PROCEDURE — 94664 DEMO&/EVAL PT USE INHALER: CPT

## 2018-07-13 PROCEDURE — 83880 ASSAY OF NATRIURETIC PEPTIDE: CPT | Performed by: EMERGENCY MEDICINE

## 2018-07-13 PROCEDURE — 87449 NOS EACH ORGANISM AG IA: CPT | Performed by: PHYSICIAN ASSISTANT

## 2018-07-13 PROCEDURE — 99285 EMERGENCY DEPT VISIT HI MDM: CPT

## 2018-07-13 PROCEDURE — 99223 1ST HOSP IP/OBS HIGH 75: CPT | Performed by: INTERNAL MEDICINE

## 2018-07-13 PROCEDURE — 85025 COMPLETE CBC W/AUTO DIFF WBC: CPT | Performed by: EMERGENCY MEDICINE

## 2018-07-13 PROCEDURE — 99222 1ST HOSP IP/OBS MODERATE 55: CPT | Performed by: INTERNAL MEDICINE

## 2018-07-13 PROCEDURE — 96365 THER/PROPH/DIAG IV INF INIT: CPT

## 2018-07-13 PROCEDURE — 96375 TX/PRO/DX INJ NEW DRUG ADDON: CPT

## 2018-07-13 PROCEDURE — 84145 PROCALCITONIN (PCT): CPT | Performed by: PHYSICIAN ASSISTANT

## 2018-07-13 PROCEDURE — 93005 ELECTROCARDIOGRAM TRACING: CPT

## 2018-07-13 PROCEDURE — 87070 CULTURE OTHR SPECIMN AEROBIC: CPT | Performed by: PHYSICIAN ASSISTANT

## 2018-07-13 PROCEDURE — 36415 COLL VENOUS BLD VENIPUNCTURE: CPT | Performed by: EMERGENCY MEDICINE

## 2018-07-13 PROCEDURE — 71275 CT ANGIOGRAPHY CHEST: CPT

## 2018-07-13 PROCEDURE — 85730 THROMBOPLASTIN TIME PARTIAL: CPT | Performed by: EMERGENCY MEDICINE

## 2018-07-13 PROCEDURE — 85610 PROTHROMBIN TIME: CPT | Performed by: EMERGENCY MEDICINE

## 2018-07-13 PROCEDURE — 94760 N-INVAS EAR/PLS OXIMETRY 1: CPT

## 2018-07-13 PROCEDURE — 84484 ASSAY OF TROPONIN QUANT: CPT | Performed by: EMERGENCY MEDICINE

## 2018-07-13 PROCEDURE — 80048 BASIC METABOLIC PNL TOTAL CA: CPT | Performed by: PHYSICIAN ASSISTANT

## 2018-07-13 PROCEDURE — 87040 BLOOD CULTURE FOR BACTERIA: CPT | Performed by: EMERGENCY MEDICINE

## 2018-07-13 RX ORDER — ONDANSETRON 2 MG/ML
4 INJECTION INTRAMUSCULAR; INTRAVENOUS ONCE
Status: COMPLETED | OUTPATIENT
Start: 2018-07-13 | End: 2018-07-13

## 2018-07-13 RX ORDER — GUAIFENESIN 600 MG
1200 TABLET, EXTENDED RELEASE 12 HR ORAL 2 TIMES DAILY
Status: DISCONTINUED | OUTPATIENT
Start: 2018-07-13 | End: 2018-07-16 | Stop reason: HOSPADM

## 2018-07-13 RX ORDER — AZITHROMYCIN 250 MG/1
500 TABLET, FILM COATED ORAL ONCE
Status: COMPLETED | OUTPATIENT
Start: 2018-07-13 | End: 2018-07-13

## 2018-07-13 RX ORDER — ALBUTEROL SULFATE 2.5 MG/3ML
2.5 SOLUTION RESPIRATORY (INHALATION) ONCE
Status: COMPLETED | OUTPATIENT
Start: 2018-07-13 | End: 2018-07-13

## 2018-07-13 RX ORDER — ACETAMINOPHEN 325 MG/1
650 TABLET ORAL EVERY 6 HOURS PRN
Status: DISCONTINUED | OUTPATIENT
Start: 2018-07-13 | End: 2018-07-16 | Stop reason: HOSPADM

## 2018-07-13 RX ORDER — GLIMEPIRIDE 2 MG/1
1 TABLET ORAL DAILY
Status: DISCONTINUED | OUTPATIENT
Start: 2018-07-13 | End: 2018-07-16 | Stop reason: HOSPADM

## 2018-07-13 RX ORDER — ONDANSETRON 2 MG/ML
4 INJECTION INTRAMUSCULAR; INTRAVENOUS EVERY 6 HOURS PRN
Status: DISCONTINUED | OUTPATIENT
Start: 2018-07-13 | End: 2018-07-16 | Stop reason: HOSPADM

## 2018-07-13 RX ORDER — ESCITALOPRAM OXALATE 20 MG/1
20 TABLET ORAL DAILY
Status: DISCONTINUED | OUTPATIENT
Start: 2018-07-13 | End: 2018-07-16 | Stop reason: HOSPADM

## 2018-07-13 RX ORDER — SODIUM CHLORIDE 9 MG/ML
125 INJECTION, SOLUTION INTRAVENOUS CONTINUOUS
Status: DISCONTINUED | OUTPATIENT
Start: 2018-07-13 | End: 2018-07-13

## 2018-07-13 RX ADMIN — BIMATOPROST 1 DROP: 0.1 SOLUTION/ DROPS OPHTHALMIC at 22:36

## 2018-07-13 RX ADMIN — TIMOLOL MALEATE 1 DROP: 2.5 SOLUTION OPHTHALMIC at 09:44

## 2018-07-13 RX ADMIN — GUAIFENESIN 1200 MG: 600 TABLET, EXTENDED RELEASE ORAL at 18:11

## 2018-07-13 RX ADMIN — ONDANSETRON 4 MG: 2 INJECTION INTRAMUSCULAR; INTRAVENOUS at 01:47

## 2018-07-13 RX ADMIN — CEFTRIAXONE 1000 MG: 1 INJECTION, POWDER, FOR SOLUTION INTRAMUSCULAR; INTRAVENOUS at 03:00

## 2018-07-13 RX ADMIN — SODIUM CHLORIDE 125 ML/HR: 0.9 INJECTION, SOLUTION INTRAVENOUS at 06:22

## 2018-07-13 RX ADMIN — CEFEPIME HYDROCHLORIDE 2000 MG: 2 INJECTION, POWDER, FOR SOLUTION INTRAVENOUS at 09:37

## 2018-07-13 RX ADMIN — AZITHROMYCIN 500 MG: 250 TABLET, FILM COATED ORAL at 03:00

## 2018-07-13 RX ADMIN — ALBUTEROL SULFATE 2.5 MG: 2.5 SOLUTION RESPIRATORY (INHALATION) at 01:45

## 2018-07-13 RX ADMIN — IOHEXOL 85 ML: 350 INJECTION, SOLUTION INTRAVENOUS at 02:49

## 2018-07-13 RX ADMIN — ESCITALOPRAM OXALATE 20 MG: 20 TABLET ORAL at 09:44

## 2018-07-13 RX ADMIN — SODIUM CHLORIDE 500 ML: 0.9 INJECTION, SOLUTION INTRAVENOUS at 03:00

## 2018-07-13 RX ADMIN — VANCOMYCIN HYDROCHLORIDE 750 MG: 750 INJECTION, SOLUTION INTRAVENOUS at 09:49

## 2018-07-13 NOTE — H&P
H&P- Robin Cheung 1940, 70 y o  female MRN: [id number]    Unit/Bed#: ED 26 Encounter: [id number]    Primary Care Provider: Trae Blackmon MD   Date and time admitted to hospital: 7/13/2018  1:15 AM    Pneumonia of right upper lobe due to infectious organism [facility])   Assessment & Plan    · Presents with cough, SOB x1 week  Saw PCP as outpatient- placed on Prednisone   · Troponin, BNP WNL  · CXR- RUQ opacity  Final read pending  · D-Dimer 842  · CTA Chest- No acute pulmonary embolic disease  Scarring with traction bronchiectasis in the right middle lobe  Branching opacity with cavitary lesion in the perihilar right upper lobe  Multiple centrilobular pulmonary nodules and branching tree in bud opacities are noted in bilateral lower lobes  Infectious etiology is favored  · Afebrile without lactic acidosis  · Leukocytosis of 12 57   · ? Secondary to prednisone  · O2 Sat 94% on RA  · Respiratory protocol  · IV Cefepime, Vanco   · Urine strep/legionella  · Sputum culture  · Blood cultures pending  VTE Prophylaxis: Enoxaparin (Lovenox)  / sequential compression device   Code Status: Full Code  POLST: POLST form is not discussed and not completed at this time  Discussion with family: Discussed with patient at bedside  Anticipated Length of Stay:  Patient will be admitted on an Inpatient basis with an anticipated length of stay of  Greater than 2 midnights  Justification for Hospital Stay: PNA  Total Time for Visit, including Counseling / Coordination of Care: 45 minutes  Greater than 50% of this total time spent on direct patient counseling and coordination of care  Chief Complaint:   SOB  History of Present Illness:    Robin Cheung is a 70 y o  female, no significant PMHx, who presents with shortness of breath and cough x2 weeks  Patient reports that over the past 2 weeks, she has had progressively worsening nonproductive cough and shortness of breath    She states that she feels as though she has mucus to bring up, but is unable to  She reports seeing her PCP for this yesterday and she was placed on Tessalon Perles and prednisone  However, she states that her cough is getting even worse  She denies any fever, chills, diaphoresis, wheezing, chest pain, palpitations, abdominal pain, nausea, vomiting, change in bowel movements, urinary symptoms, dizziness, headache  Review of Systems:    Review of Systems   Constitutional: Negative for chills, diaphoresis and fever  Respiratory: Positive for cough and shortness of breath  Negative for wheezing  Cardiovascular: Negative for chest pain and palpitations  Gastrointestinal: Negative for abdominal pain, constipation, diarrhea, nausea and vomiting  Genitourinary: Negative for dysuria, frequency, hematuria and urgency  Neurological: Negative for dizziness, light-headedness and headaches  All other systems reviewed and are negative  Past Medical and Surgical History:     History reviewed  No pertinent past medical history  History reviewed  No pertinent surgical history  Meds/Allergies:    Prior to Admission medications    Medication Sig Start Date End Date Taking?  Authorizing Provider   Ascorbic Acid (VITAMIN C PO) Take by mouth    Historical Provider, MD   Cholecalciferol (VITAMIN D3 PO) Take by mouth    Historical Provider, MD   Coenzyme Q10 (COQ-10 PO) Take 200 mg by mouth daily      Historical Provider, MD   escitalopram (LEXAPRO) 20 mg tablet Take 20 mg by mouth daily 3/16/18   Historical ProviderMD MILANIGAN 0 01 % ophthalmic drops 1 DROP INTO BOTH EYES AT BEDTIME 2/16/18   Historical Provider, MD   timolol (TIMOPTIC-XE) 0 25 % ophthalmic gel-forming INSTILL 1 DROP INTO LEFT EYE EVERY MORNING 3/30/18   Historical Provider, MD   TURMERIC PO Take by mouth    Historical Provider, MD   ADVAIR DISKUS 100-50 MCG/DOSE Inhale 2 (two) times a day Rinse mouth after use 4/17/18 7/13/18  Historical Provider, MD I have reviewed home medications with patient personally  Allergies: No Known Allergies    Social History:     Marital Status: /Civil Union   Occupation: Unknown  Patient Pre-hospital Living Situation: Home  Patient Pre-hospital Level of Mobility: Independent  Patient Pre-hospital Diet Restrictions: None  Substance Use History:   History   Alcohol Use No     History   Smoking Status    Never Smoker   Smokeless Tobacco    Never Used     History   Drug Use No       Family History:    non-contributory    Physical Exam:     Vitals:   Blood Pressure: 122/61 (07/13/18 0240)  Pulse: 86 (07/13/18 0430)  Temperature: 98 1 °F (36 7 °C) (07/13/18 0116)  Temp Source: Oral (07/13/18 0116)  Respirations: 16 (07/13/18 0430)  Weight - Scale: 52 2 kg (115 lb) (07/13/18 0116)  SpO2: 94 % (07/13/18 0430)    Physical Exam   Constitutional: She is oriented to person, place, and time  She appears well-developed and well-nourished  She is cooperative  She does not appear ill  No distress  HENT:   Head: Normocephalic and atraumatic  Eyes: Conjunctivae, EOM and lids are normal  Pupils are equal, round, and reactive to light  Cardiovascular: Normal rate, regular rhythm, normal heart sounds and normal pulses  No murmur heard  No LE edema bilaterally  Pulmonary/Chest: Effort normal and breath sounds normal  She has no wheezes  She has no rhonchi  She has no rales  Abdominal: Soft  Normal appearance and bowel sounds are normal  There is no tenderness  Musculoskeletal: Normal range of motion  Neurological: She is alert and oriented to person, place, and time  She has normal strength  She is not disoriented  No sensory deficit  Skin: Skin is warm, dry and intact  She is not diaphoretic  Psychiatric: She has a normal mood and affect  Her speech is normal and behavior is normal  Cognition and memory are normal    Vitals reviewed        Additional Data:     Lab Results: I have personally reviewed pertinent reports  Results from last 7 days  Lab Units 07/13/18  0137   WBC Thousand/uL 12 57*   HEMOGLOBIN g/dL 12 0   HEMATOCRIT % 37 6   PLATELETS Thousands/uL 266   NEUTROS PCT % 51   LYMPHS PCT % 44   MONOS PCT % 5   EOS PCT % 0       Results from last 7 days  Lab Units 07/13/18  0137   SODIUM mmol/L 137   POTASSIUM mmol/L 3 9   CHLORIDE mmol/L 102   CO2 mmol/L 30   BUN mg/dL 18   CREATININE mg/dL 0 68   CALCIUM mg/dL 9 6   TOTAL PROTEIN g/dL 7 5   BILIRUBIN TOTAL mg/dL 0 20   ALK PHOS U/L 88   ALT U/L 28   AST U/L 20   GLUCOSE RANDOM mg/dL 124       Results from last 7 days  Lab Units 07/13/18  0137   INR  1 01               Imaging: I have personally reviewed pertinent reports  CTA ED chest PE study   ED Interpretation by Patric Kahn MD (07/13 0512)   COMPARISON:   DX - XR CHEST PA LATERAL 2018-07-13 01:53   FINDINGS:   Pulmonary arteries: Normal  No pulmonary emboli  Aorta: Normal  No aortic aneurysm  No aortic dissection  Lungs: Scarring with traction bronchiectasis in the right middle lobe  Branching opacity with cavitary   lesion in the perihilar right upper lobe  Multiple centrilobular pulmonary nodules and branching tree in   bud opacities are noted in bilateral lower lobes  Pleural space: Normal  No pneumothorax  No pleural effusion  Heart: Atherosclerotic coronary arteries  Mediastinum: Circumferential thickening of the esophagus  Thyroid: Heterogeneously enlarged right thyroid lobe containing coarse calcification  Smaller low   attenuation lesion in the left thyroid lobe  The further assessed with thyroid ultrasound if indicated  Bones/joints: Multilevel degenerative disease of the thoracic spine  Soft tissues: Bilateral saline breast implants  Lymph nodes: Unremarkable  No enlarged lymph nodes  IMPRESSION:   No acute pulmonary embolic disease  Scarring with traction bronchiectasis in the right middle lobe   Branching opacity with cavitary lesion in   the perihilar right upper lobe  Multiple centrilobular pulmonary nodules and branching tree in bud   opacities are noted in bilateral lower lobes  Infectious etiology is favored  Thank you for allowing us to participate in the care of your patient  Dictated and Authenticated by: Sadia Aceves MD   07/13/2018 5:07 AM Mily Nicole Time (Darlin Al [id number])      Final Result by Kimmie Wayne DO (07/13 5997)      No evidence of pulmonary embolus      Branching airspace opacity in the right upper lobe with associated cavitary lesion  Findings may be seen in the setting of infectious etiology however other etiology such as malignancy cannot be entirely excluded  Recommend correlation and follow-up    with pulmonary as well as follow-up CT scan of the chest       Nodular airspace opacities in the left lower lobe which may represent infectious etiology  Follow-up to resolution is recommended  5 mm nodule in the right upper lobe for which follow-up CT scan in 6 months is recommended  Incidental thyroid nodule(s) for which nonemergent thyroid ultrasound is recommended  The study was marked in EPIC for significant notification  Findings are consistent with the preliminary report from [facility] which was provided shortly after completion of the exam          Workstation performed: [id number]         XR chest 2 views   ED Interpretation by Duc Douglass MD (07/13 3759)   RUL mass read by me  EKG, Pathology, and Other Studies Reviewed on Admission:   · EKG: NSR, rate 78  Incomplete RBBB  Q waves in III  Allscripts / Epic Records Reviewed: Yes     ** Please Note: This note has been constructed using a voice recognition system   **

## 2018-07-13 NOTE — RESPIRATORY THERAPY NOTE
RT Protocol Note  Dina Xie 70 y o  female MRN: [id number]  Unit/Bed#: -01 Encounter: [id number]    Assessment    Active Problems:    Pneumonia of right upper lobe due to infectious organism [facility])      Home Pulmonary Medications:  None       History reviewed  No pertinent past medical history  Social History     Social History    Marital status: /Civil Union     Spouse name: N/A    Number of children: N/A    Years of education: N/A     Social History Main Topics    Smoking status: Never Smoker    Smokeless tobacco: Never Used    Alcohol use No    Drug use: No    Sexual activity: Not Asked     Other Topics Concern    None     Social History Narrative    None       Subjective    Subjective Data: Complaints of cough  Objective    Physical Exam:   Assessment Type: Assess only  General Appearance: Awake, Alert  Respiratory Pattern: Normal  Chest Assessment: Chest expansion symmetrical  R Breath Sounds: Diminished, Coarse  L Breath Sounds: Diminished, Clear  O2 Device: Currently on RA    Vitals:  Blood pressure 109/67, pulse 87, temperature 98 3 °F (36 8 °C), temperature source Oral, resp  rate 18, height 5' 4" (1 626 m), weight 52 2 kg (115 lb 1 3 oz), SpO2 100 %  Imaging and other studies: I have personally reviewed pertinent reports  O2 Device: Currently on RA     Plan    Respiratory Plan: No distress/Pulmonary history        Resp Comments: Pt does not have COPD/asthma  Does not take any meds at-home  Does not wear O2  Was never a smoker  Pt did not appear to be in any distress  BS were clear on the left and slightly coarse on the right  Pt does not require any bronchodilators at this time  Protocol will be discontinued

## 2018-07-13 NOTE — MEDICAL STUDENT
MEDICAL STUDENT  Inpatient Progress Note for TRAINING ONLY  Not Part of Legal Medical Record       Progress Note - Rafat Kwan 70 y o  female MRN: [id number]    Unit/Bed#: -Ed Encounter: [id number]      Assessment/Plan:  · Right upper lobe pneumonia  · CXR reveals RUL opacity suspicious for infective etiology  · CTA Chest- No acute pulmonary embolic disease  Scarring with traction bronchiectasis in the right middle lobe  Branching opacity with cavitary lesion in the perihilar right upper lobe  Multiple centrilobular pulmonary nodules and branching tree in bud opacities are noted in bilateral lower lobes  Infectious etiology is favored  · Currently day #1 IV cefepine and vanco  · Afebrile, O2 sat of 100% on RA  · WBC 13 6  · Procal 0 05, lactic 0 8  · Ddimer 842  · Blood cultures x2 pending  · Urine strep/legionella pending  · Sputum culture pending  · Respiratory protocol  · Repeat CBCd in AM       Subjective: The patient is a 70year old female currently HD #1 presenting to the internal med service for evaluation of RUL pneumonia  The patient had seen her PCP for 2 weeks of increasing dyspnea and non-productive cough  She was given prednisone and benzonatate which did not improve her cough  Currently she is feeling "much better" since coming to the floor  She is day #1 of vanco and cefepime IV  Has not required any PRN albuterol  She has a good appetite and is tolerating PO fluids  Denies pain  She currently denies any dyspnea, cough, fevers, chills, abdominal pain, N/V, diarrhea, headache, syncope  Objective:     Vitals: Blood pressure 109/67, pulse 87, temperature 98 3 °F (36 8 °C), temperature source Oral, resp  rate 18, height 5' 4" (1 626 m), weight 52 2 kg (115 lb 1 3 oz), SpO2 100 %  ,Body mass index is 19 75 kg/m²        Intake/Output Summary (Last 24 hours) at 07/13/18 1328  Last data filed at 07/13/18 1124   Gross per 24 hour   Intake              730 ml   Output                0 ml Net              730 ml       Physical Exam   Constitutional: She is oriented to person, place, and time  She appears well-developed and well-nourished  HENT:   Head: Normocephalic and atraumatic  Mouth/Throat: Oropharynx is clear and moist    Eyes: EOM are normal  Pupils are equal, round, and reactive to light  Neck: Normal range of motion  Cardiovascular: Normal rate, regular rhythm and normal heart sounds  Exam reveals no gallop and no friction rub  No murmur heard  Pulmonary/Chest: Effort normal and breath sounds normal  No stridor  No respiratory distress  She has no wheezes  She has no rales  Abdominal: Soft  Bowel sounds are normal  She exhibits no distension  There is no tenderness  There is no guarding  Musculoskeletal: She exhibits no edema or deformity  Neurological: She is alert and oriented to person, place, and time  Skin: Skin is warm and dry  Capillary refill takes less than 2 seconds  No rash noted  Invasive Devices     Peripheral Intravenous Line            Peripheral IV 07/13/18 Right Forearm less than 1 day              Results Reviewed     Procedure Component Value Units Date/Time    B-type natriuretic peptide [[id number]]  (Normal) Collected:  07/13/18 0137    Lab Status:  Final result Specimen:  Blood from Arm, Right Updated:  07/13/18 0215     NT-proBNP 262 pg/mL     Lactic acid, plasma [[id number]]  (Normal) Collected:  07/13/18 0137    Lab Status:  Final result Specimen:  Blood from Arm, Right Updated:  07/13/18 0210     LACTIC ACID 0 8 mmol/L     Narrative:         Result may be elevated if tourniquet was used during collection      Troponin I [[id number]]  (Normal) Collected:  07/13/18 0137    Lab Status:  Final result Specimen:  Blood from Arm, Right Updated:  07/13/18 0209     Troponin I <0 02 ng/mL     Comprehensive metabolic panel [[id number]]  (Abnormal) Collected:  07/13/18 0137    Lab Status:  Final result Specimen:  Blood from Arm, Right Updated:  07/13/18 7824 Sodium 137 mmol/L      Potassium 3 9 mmol/L      Chloride 102 mmol/L      CO2 30 mmol/L      Anion Gap 5 mmol/L      BUN 18 mg/dL      Creatinine 0 68 mg/dL      Glucose 124 mg/dL      Calcium 9 6 mg/dL      AST 20 U/L      ALT 28 U/L      Alkaline Phosphatase 88 U/L      Total Protein 7 5 g/dL      Albumin 3 4 (L) g/dL      Total Bilirubin 0 20 mg/dL      eGFR 85 ml/min/1 73sq m     Narrative:         National Kidney Disease Education Program recommendations are as follows:  GFR calculation is accurate only with a steady state creatinine  Chronic Kidney disease less than 60 ml/min/1 73 sq  meters  Kidney failure less than 15 ml/min/1 73 sq  meters      D-Dimer [[id number]]  (Abnormal) Collected:  07/13/18 0137    Lab Status:  Final result Specimen:  Blood from Arm, Right Updated:  07/13/18 0204     D-Dimer, Quant 842 (H) ng/ml (FEU)     Protime-INR [[id number]]  (Normal) Collected:  07/13/18 0137    Lab Status:  Final result Specimen:  Blood from Arm, Right Updated:  07/13/18 0200     Protime 13 0 seconds      INR 1 01    APTT [[id number]]  (Normal) Collected:  07/13/18 0137    Lab Status:  Final result Specimen:  Blood from Arm, Right Updated:  07/13/18 0200     PTT 30 seconds     Blood gas, venous [[id number]]  (Abnormal) Collected:  07/13/18 0137    Lab Status:  Final result Specimen:  Blood from Arm, Right Updated:  07/13/18 0148     pH, Evens 7 427 (H)     pCO2, Evens 42 7 mm Hg      pO2, Evens 47 3 (H) mm Hg      HCO3, Evens 27 5 mmol/L      Base Excess, Evens 2 8 mmol/L      O2 Content, Evens 14 9 ml/dL      O2 HGB, VENOUS 83 0 (H) %     CBC and differential [[id number]]  (Abnormal) Collected:  07/13/18 0137    Lab Status:  Final result Specimen:  Blood from Arm, Right Updated:  07/13/18 0148     WBC 12 57 (H) Thousand/uL      RBC 3 80 (L) Million/uL      Hemoglobin 12 0 g/dL      Hematocrit 37 6 %      MCV 99 (H) fL      MCH 31 6 pg      MCHC 31 9 g/dL      RDW 13 2 %      MPV 10 9 fL      Platelets 103 Thousands/uL Neutrophils Relative 51 %      Lymphocytes Relative 44 %      Monocytes Relative 5 %      Eosinophils Relative 0 %      Basophils Relative 0 %      Neutrophils Absolute 6 39 Thousands/µL      Lymphocytes Absolute 5 50 (H) Thousands/µL      Monocytes Absolute 0 66 Thousand/µL      Eosinophils Absolute 0 01 Thousand/µL      Basophils Absolute 0 01 Thousands/µL     Blood culture #2 [[id number]] Collected:  07/13/18 0137    Lab Status: In process Specimen:  Blood from Arm, Left Updated:  07/13/18 0144    Blood culture #1 [[id number]] Collected:  07/13/18 0138    Lab Status: In process Specimen:  Blood from Arm, Right Updated:  07/13/18 0144        RADIOLOGY RESULTS   Final Result by  (07/13 1205)      XR chest 2 views   ED Interpretation by Femi Dubose MD (07/13 6964)   RUL mass read by me  Final Result by Margaret Rouse MD (07/13 5328)   Bilateral airspace opacities with right upper lobe opacity exhibiting a cavitary component  Correlate with recent CT chest   Infectious etiology suspected with malignancy difficult to exclude  Workstation performed: [id number]         CTA ED chest PE study   ED Interpretation by Femi Dubose MD (07/13 3402)   COMPARISON:   DX - XR CHEST PA LATERAL 2018-07-13 01:53   FINDINGS:   Pulmonary arteries: Normal  No pulmonary emboli  Aorta: Normal  No aortic aneurysm  No aortic dissection  Lungs: Scarring with traction bronchiectasis in the right middle lobe  Branching opacity with cavitary   lesion in the perihilar right upper lobe  Multiple centrilobular pulmonary nodules and branching tree in   bud opacities are noted in bilateral lower lobes  Pleural space: Normal  No pneumothorax  No pleural effusion  Heart: Atherosclerotic coronary arteries  Mediastinum: Circumferential thickening of the esophagus  Thyroid: Heterogeneously enlarged right thyroid lobe containing coarse calcification  Smaller low   attenuation lesion in the left thyroid lobe   The further assessed with thyroid ultrasound if indicated  Bones/joints: Multilevel degenerative disease of the thoracic spine  Soft tissues: Bilateral saline breast implants  Lymph nodes: Unremarkable  No enlarged lymph nodes  IMPRESSION:   No acute pulmonary embolic disease  Scarring with traction bronchiectasis in the right middle lobe  Branching opacity with cavitary lesion in   the perihilar right upper lobe  Multiple centrilobular pulmonary nodules and branching tree in bud   opacities are noted in bilateral lower lobes  Infectious etiology is favored  Thank you for allowing us to participate in the care of your patient  Dictated and Authenticated by: Tiago Block MD   07/13/2018 5:07 AM Merril Locket Time ([place] [address])      Final Result by Vani Zhao DO (07/13 5122)      No evidence of pulmonary embolus      Branching airspace opacity in the right upper lobe with associated cavitary lesion  Findings may be seen in the setting of infectious etiology however other etiology such as malignancy cannot be entirely excluded  Recommend correlation and follow-up    with pulmonary as well as follow-up CT scan of the chest       Nodular airspace opacities in the left lower lobe which may represent infectious etiology  Follow-up to resolution is recommended  5 mm nodule in the right upper lobe for which follow-up CT scan in 6 months is recommended  Incidental thyroid nodule(s) for which nonemergent thyroid ultrasound is recommended  The study was marked in EPIC for significant notification  Findings are consistent with the preliminary report from [facility] which was provided shortly after completion of the exam          Workstation performed: [id number]               Lab, Imaging and other studies: I have personally reviewed pertinent reports      VTE Pharmacologic Prophylaxis: Sequential compression device (Venodyne)   VTE Mechanical Prophylaxis: sequential compression device

## 2018-07-13 NOTE — CASE MANAGEMENT
Thank you,  Michael Aqq  [facility] Utilization Review Department  Phone: [phone]; Fax [phone]  ATTENTION: The Network Utilization Review Department is now centralized for our 9 Facilities  Make a note that we have a new phone and fax numbers for our Department  Please call with any questions or concerns to [phone] and carefully follow the prompts so that you are directed to the right person  All voicemails are confidential  Fax any determinations, approvals, denials, and requests for initial or continue stay review clinical to [phone]  Due to HIGH CALL volume, it would be easier if you could please send faxed requests to expedite your requests and in part, help us provide discharge notifications faster  Initial Clinical Review    Admission: Date/Time/Statement: INPATIENT ADMISSION 7/13/18 @ 0530     Orders Placed This Encounter   Procedures    Inpatient Admission (expected length of stay for this patient is greater than two midnights)     Standing Status:   Standing     Number of Occurrences:   1     Order Specific Question:   Admitting Physician     Answer:   Da Sheffield [[id number]]     Order Specific Question:   Level of Care     Answer:   Med Surg [[id number]]     Order Specific Question:   Bed request comments     Answer:   droplet precautions     Order Specific Question:   Estimated length of stay     Answer:   More than 2 Midnights     Order Specific Question:   Certification     Answer:   I certify that inpatient services are medically necessary for this patient for a duration of greater than two midnights  See H&P and MD Progress Notes for additional information about the patient's course of treatment         ED: Date/Time/Mode of Arrival:   ED Arrival Information     Expected Arrival Acuity Means of Arrival Escorted By Service Admission Type    - 7/13/2018 01:10 Urgent Walk-In Self General Medicine Urgent    Arrival Complaint    Difficulty breathing          Chief Complaint:   Chief Complaint   Patient presents with    Shortness of Breath     pt c/o cough and SOB  seen by PCP yesterday for same  denies CP  placed on prednisone and diagnosed with URI  History of Illness: 70 Y  O  Female presenting with SOB & cough getting progressively worse over 2 weeks  Has unproductive cough  Had been under MD care for Tessalon Perles & predisone without relief  ED Vital Signs:   ED Triage Vitals   Temperature Pulse Respirations Blood Pressure SpO2   07/13/18 0116 07/13/18 0116 07/13/18 0116 07/13/18 0116 07/13/18 0116   98 1 °F (36 7 °C) 90 16 143/76 98 %      Temp Source Heart Rate Source Patient Position - Orthostatic VS BP Location FiO2 (%)   07/13/18 0116 -- 07/13/18 0701 07/13/18 0701 --   Oral  Lying Right arm       Pain Score       07/13/18 0116       No Pain        Wt Readings from Last 1 Encounters:   07/13/18 52 2 kg (115 lb 1 3 oz)       Vital Signs (abnormal): none documented    Abnormal Labs/Diagnostic Test Results:07/13/2018:  pH, Evens 7 427     pO2, Evens 47 3     O2 HGB, VENOUS 83 0     Albumin 3 4  WBC 12 57     RBC 3 80       D-dimer 842  WBC 13 69     RBC 3 50     Hemoglobin 11 1     Hematocrit 34 7     07/13/2018: CTA ED chest PE study  No evidence of pulmonary embolus  Branching airspace opacity in the right upper lobe with associated cavitary lesion   Findings may be seen in the setting of infectious etiology however other etiology such as malignancy cannot be entirely excluded   Recommend correlation and follow-up   with pulmonary as well as follow-up CT scan of the chest   Nodular airspace opacities in the left lower lobe which may represent infectious etiology   Follow-up to resolution is recommended  5 mm nodule in the right upper lobe for which follow-up CT scan in 6 months is recommended  Incidental thyroid nodule(s) for which nonemergent thyroid ultrasound is recommended    CXR: Bilateral airspace opacities with right upper lobe opacity exhibiting a cavitary component   Correlate with recent CT chest   Infectious etiology suspected with malignancy difficult to exclude  ED Treatment:   Medication Administration from 07/13/2018 0110 to 07/13/2018 3603       Date/Time Order Dose Route Action Action by Comments     07/13/2018 0145 albuterol inhalation solution 2 5 mg 2 5 mg Nebulization Given Marisela Cardoso RN      07/13/2018 0147 ondansetron (ZOFRAN) injection 4 mg 4 mg Intravenous Given Marisela Cardoso RN      07/13/2018 0400 sodium chloride 0 9 % bolus 500 mL 0 mL Intravenous Stopped Marisela Cardoso RN      07/13/2018 0300 sodium chloride 0 9 % bolus 500 mL 500 mL Intravenous New Bag Marisela Cardoso RN      07/13/2018 0622 sodium chloride 0 9 % infusion 125 mL/hr Intravenous New Bag Marisela Cardoso RN      07/13/2018 0330 ceftriaxone (ROCEPHIN) 1 g/50 mL in dextrose IVPB 0 mg Intravenous Stopped Marisela Cardoso RN      07/13/2018 0300 ceftriaxone (ROCEPHIN) 1 g/50 mL in dextrose IVPB 1,000 mg Intravenous New Bag Marisela Cardoso RN      07/13/2018 0300 azithromycin (ZITHROMAX) tablet 500 mg 500 mg Oral Given Marisela Cardoso RN      07/13/2018 0249 iohexol (OMNIPAQUE) 350 MG/ML injection (MULTI-DOSE) 85 mL 85 mL Intravenous Given Valentine Ivory           Past Medical/Surgical History: Active Ambulatory Problems     Diagnosis Date Noted    No Active Ambulatory Problems     Resolved Ambulatory Problems     Diagnosis Date Noted    No Resolved Ambulatory Problems     No Additional Past Medical History       Admitting Diagnosis: Shortness of breath [R06 02]  Pneumonia [J18 9]    Age/Sex: 70 y o  female    Assessment/Plan:   Pneumonia of right upper lobe due to infectious organism [facility])   Assessment & Plan     · Presents with cough, SOB x1 week  Saw PCP as outpatient- placed on Prednisone   · Troponin, BNP WNL  · CXR- RUQ opacity  Final read pending  · D-Dimer 842  · CTA Chest- No acute pulmonary embolic disease   Scarring with traction bronchiectasis in the right middle lobe  Branching opacity with cavitary lesion in the perihilar right upper lobe  Multiple centrilobular pulmonary nodules and branching tree in bud opacities are noted in bilateral lower lobes  Infectious etiology is favored  · Afebrile without lactic acidosis  · Leukocytosis of 12 57   ? ? Secondary to prednisone  · O2 Sat 94% on RA  · Respiratory protocol  · IV Cefepime, Vanco   · Urine strep/legionella  · Sputum culture    · Blood cultures pending              VTE Prophylaxis: Enoxaparin (Lovenox)  / sequential compression device     Admission Orders:  Scheduled Meds:   Current Facility-Administered Medications:  acetaminophen 650 mg Oral Q6H PRN    bimatoprost 1 drop Both Eyes HS    cefepime 2,000 mg Intravenous Q12H Last Rate: 2,000 mg (07/13/18 0937)   enoxaparin 40 mg Subcutaneous Daily    escitalopram 20 mg Oral Daily    ondansetron 4 mg Intravenous Q6H PRN    timolol 1 drop Left Eye Daily    vancomycin 15 mg/kg Intravenous Q12H Last Rate: 750 mg (07/13/18 0949)     Continuous Infusions:    PRN Meds:   acetaminophen    ondansetron  Regular diet  Aspiration precautions  PIV  Up OOB  Incentive spirometry  Vitals routine  procalcitonin  Oral care  SCD bilateral

## 2018-07-13 NOTE — ED PROVIDER NOTES
History  Chief Complaint   Patient presents with    Shortness of Breath     pt c/o cough and SOB  seen by PCP yesterday for same  denies CP  placed on prednisone and diagnosed with URI  Patient is a 70year old female with about 1 week of worsening cough and sob  No fever  (+) N/V for past 2 days  No diarrhea  No travel  Saw PMD yesterday and was placed on prednisone  Was last seen in this ED on 9/10/16 for contact dermatitis  [facility] website checked on this patient and last Rx filled was on 18 for codeine and guaifenesin for 24 day supply  History provided by:  Patient   used: No        Prior to Admission Medications   Prescriptions Last Dose Informant Patient Reported? Taking? Ascorbic Acid (VITAMIN C PO)   Yes No   Sig: Take by mouth   Cholecalciferol (VITAMIN D3 PO)   Yes No   Sig: Take by mouth   Coenzyme Q10 (COQ-10 PO)   Yes No   Sig: Take 200 mg by mouth daily     LUMIGAN 0 01 % ophthalmic drops   Yes No   Si DROP INTO BOTH EYES AT BEDTIME   TURMERIC PO   Yes No   Sig: Take by mouth   escitalopram (LEXAPRO) 20 mg tablet   Yes No   Sig: Take 20 mg by mouth daily   timolol (TIMOPTIC-XE) 0 25 % ophthalmic gel-forming   Yes No   Sig: INSTILL 1 DROP INTO LEFT EYE EVERY MORNING      Facility-Administered Medications: None       History reviewed  No pertinent past medical history  History reviewed  No pertinent surgical history  Family History   Problem Relation Age of Onset    Dementia Mother     Hypertension Father      I have reviewed and agree with the history as documented  Social History   Substance Use Topics    Smoking status: Never Smoker    Smokeless tobacco: Never Used    Alcohol use No        Review of Systems   Constitutional: Negative for fever  Respiratory: Positive for cough and shortness of breath  Gastrointestinal: Positive for nausea and vomiting  Negative for diarrhea     All other systems reviewed and are negative  Physical Exam  Physical Exam   Constitutional: She is oriented to person, place, and time  She appears distressed (mild)  HENT:   Head: Normocephalic and atraumatic  Mouth/Throat: Oropharynx is clear and moist    Eyes: No scleral icterus  Neck: Normal range of motion  Neck supple  No JVD present  No tracheal deviation present  Cardiovascular: Normal rate, regular rhythm and normal heart sounds  No murmur heard  Pulmonary/Chest: Effort normal  No stridor  No respiratory distress  She has no wheezes  She has no rales  Scattered rhonci bilaterally  Abdominal: Soft  Bowel sounds are normal  She exhibits no distension  There is no tenderness  Musculoskeletal: She exhibits no edema, tenderness (No calf tenderness) or deformity  Neurological: She is alert and oriented to person, place, and time  Skin: Skin is warm and dry  No rash noted  Psychiatric: She has a normal mood and affect  Nursing note and vitals reviewed        Vital Signs  ED Triage Vitals [07/13/18 0116]   Temperature Pulse Respirations Blood Pressure SpO2   98 1 °F (36 7 °C) 90 16 143/76 98 %      Temp Source Heart Rate Source Patient Position - Orthostatic VS BP Location FiO2 (%)   Oral -- -- -- --      Pain Score       No Pain           Vitals:    07/13/18 0116 07/13/18 0230 07/13/18 0240 07/13/18 0430   BP: 143/76  122/61    Pulse: 90 86  86       Visual Acuity      ED Medications  Medications   sodium chloride 0 9 % infusion (not administered)   albuterol inhalation solution 2 5 mg (2 5 mg Nebulization Given 7/13/18 0145)   ondansetron (ZOFRAN) injection 4 mg (4 mg Intravenous Given 7/13/18 0147)   sodium chloride 0 9 % bolus 500 mL (0 mL Intravenous Stopped 7/13/18 0400)   ceftriaxone (ROCEPHIN) 1 g/50 mL in dextrose IVPB (0 mg Intravenous Stopped 7/13/18 0330)   azithromycin (ZITHROMAX) tablet 500 mg (500 mg Oral Given 7/13/18 0300)   iohexol (OMNIPAQUE) 350 MG/ML injection (MULTI-DOSE) 85 mL (85 mL Intravenous Given 7/13/18 0249)       Diagnostic Studies  Results Reviewed     Procedure Component Value Units Date/Time    B-type natriuretic peptide [[id number]]  (Normal) Collected:  07/13/18 0137    Lab Status:  Final result Specimen:  Blood from Arm, Right Updated:  07/13/18 0215     NT-proBNP 262 pg/mL     Lactic acid, plasma [[id number]]  (Normal) Collected:  07/13/18 0137    Lab Status:  Final result Specimen:  Blood from Arm, Right Updated:  07/13/18 0210     LACTIC ACID 0 8 mmol/L     Narrative:         Result may be elevated if tourniquet was used during collection  Troponin I [[id number]]  (Normal) Collected:  07/13/18 0137    Lab Status:  Final result Specimen:  Blood from Arm, Right Updated:  07/13/18 0209     Troponin I <0 02 ng/mL     Comprehensive metabolic panel [[id number]]  (Abnormal) Collected:  07/13/18 0137    Lab Status:  Final result Specimen:  Blood from Arm, Right Updated:  07/13/18 2888     Sodium 137 mmol/L      Potassium 3 9 mmol/L      Chloride 102 mmol/L      CO2 30 mmol/L      Anion Gap 5 mmol/L      BUN 18 mg/dL      Creatinine 0 68 mg/dL      Glucose 124 mg/dL      Calcium 9 6 mg/dL      AST 20 U/L      ALT 28 U/L      Alkaline Phosphatase 88 U/L      Total Protein 7 5 g/dL      Albumin 3 4 (L) g/dL      Total Bilirubin 0 20 mg/dL      eGFR 85 ml/min/1 73sq m     Narrative:         National Kidney Disease Education Program recommendations are as follows:  GFR calculation is accurate only with a steady state creatinine  Chronic Kidney disease less than 60 ml/min/1 73 sq  meters  Kidney failure less than 15 ml/min/1 73 sq  meters      D-Dimer [[id number]]  (Abnormal) Collected:  07/13/18 0137    Lab Status:  Final result Specimen:  Blood from Arm, Right Updated:  07/13/18 0204     D-Dimer, Quant 842 (H) ng/ml (FEU)     Protime-INR [[id number]]  (Normal) Collected:  07/13/18 0137    Lab Status:  Final result Specimen:  Blood from Arm, Right Updated:  07/13/18 0200     Protime 13 0 seconds      INR 1 01 APTT [[id number]]  (Normal) Collected:  07/13/18 0137    Lab Status:  Final result Specimen:  Blood from Arm, Right Updated:  07/13/18 0200     PTT 30 seconds     Blood gas, venous [[id number]]  (Abnormal) Collected:  07/13/18 0137    Lab Status:  Final result Specimen:  Blood from Arm, Right Updated:  07/13/18 0148     pH, Evens 7 427 (H)     pCO2, Evens 42 7 mm Hg      pO2, Evens 47 3 (H) mm Hg      HCO3, Evens 27 5 mmol/L      Base Excess, Evens 2 8 mmol/L      O2 Content, Evens 14 9 ml/dL      O2 HGB, VENOUS 83 0 (H) %     CBC and differential [[id number]]  (Abnormal) Collected:  07/13/18 0137    Lab Status:  Final result Specimen:  Blood from Arm, Right Updated:  07/13/18 0148     WBC 12 57 (H) Thousand/uL      RBC 3 80 (L) Million/uL      Hemoglobin 12 0 g/dL      Hematocrit 37 6 %      MCV 99 (H) fL      MCH 31 6 pg      MCHC 31 9 g/dL      RDW 13 2 %      MPV 10 9 fL      Platelets 055 Thousands/uL      Neutrophils Relative 51 %      Lymphocytes Relative 44 %      Monocytes Relative 5 %      Eosinophils Relative 0 %      Basophils Relative 0 %      Neutrophils Absolute 6 39 Thousands/µL      Lymphocytes Absolute 5 50 (H) Thousands/µL      Monocytes Absolute 0 66 Thousand/µL      Eosinophils Absolute 0 01 Thousand/µL      Basophils Absolute 0 01 Thousands/µL     Blood culture #2 [[id number]] Collected:  07/13/18 0137    Lab Status: In process Specimen:  Blood from Arm, Left Updated:  07/13/18 0144    Blood culture #1 [[id number]] Collected:  07/13/18 0138    Lab Status: In process Specimen:  Blood from Arm, Right Updated:  07/13/18 0144                 CTA ED chest PE study   ED Interpretation by Caralee Essex, MD (07/13 0512)   COMPARISON:   DX - XR CHEST PA LATERAL 2018-07-13 01:53   FINDINGS:   Pulmonary arteries: Normal  No pulmonary emboli  Aorta: Normal  No aortic aneurysm  No aortic dissection  Lungs: Scarring with traction bronchiectasis in the right middle lobe   Branching opacity with cavitary   lesion in the perihilar right upper lobe  Multiple centrilobular pulmonary nodules and branching tree in   bud opacities are noted in bilateral lower lobes  Pleural space: Normal  No pneumothorax  No pleural effusion  Heart: Atherosclerotic coronary arteries  Mediastinum: Circumferential thickening of the esophagus  Thyroid: Heterogeneously enlarged right thyroid lobe containing coarse calcification  Smaller low   attenuation lesion in the left thyroid lobe  The further assessed with thyroid ultrasound if indicated  Bones/joints: Multilevel degenerative disease of the thoracic spine  Soft tissues: Bilateral saline breast implants  Lymph nodes: Unremarkable  No enlarged lymph nodes  IMPRESSION:   No acute pulmonary embolic disease  Scarring with traction bronchiectasis in the right middle lobe  Branching opacity with cavitary lesion in   the perihilar right upper lobe  Multiple centrilobular pulmonary nodules and branching tree in bud   opacities are noted in bilateral lower lobes  Infectious etiology is favored  Thank you for allowing us to participate in the care of your patient  Dictated and Authenticated by: Nicole Oropeza MD   07/13/2018 5:07 AM [place] Time (Darlin Norma Caciola 1159)       by Fawad Rollins DO (07/13 6911)      XR chest 2 views   ED Interpretation by Aguila Sierra MD (07/13 8318)   RUL mass read by me                    Procedures  ECG 12 Lead Documentation  Date/Time: 7/13/2018 1:30 AM  Performed by: Ramana Amaro  Authorized by: Ramana Amaro     Indications / Diagnosis:  Sob  ECG reviewed by me, the ED Provider: yes    Patient location:  ED  Previous ECG:     Previous ECG:  Compared to current    Comparison ECG info:  5/24/14    Similarity:  No change  Rate:     ECG rate:  78    ECG rate assessment: normal    Rhythm:     Rhythm: sinus rhythm    Ectopy:     Ectopy: none    QRS:     QRS axis:  Left  Conduction:     Conduction: abnormal      Abnormal conduction: incomplete RBBB ST segments:     ST segments:  Normal  T waves:     T waves: normal    Q waves:     Q waves:  III  Other findings:     Other findings: poor R wave progression    Comments:      I do not agree with computer reading of cannot rule out inferior and anterior infarct  Phone Contacts  ED Phone Contact    ED Course  ED Course as of Jul 13 0531 Fri Jul 13, 2018   0213 Labs, CXR, EKG d/w patient and CT PE study ordered and abx ordered  3238 CT d/w patient  Initial Sepsis Screening     Row Name 07/13/18 0211                Is the patient's history suggestive of a new or worsening infection? (!)  Yes (Proceed)  -AO        Suspected source of infection pneumonia  -AO        Are two or more of the following signs & symptoms of infection both present and new to the patient? No  -AO        Indicate SIRS criteria Leukocytosis (WBC > 88782 IJL)  -AO        If the answer is yes to both questions, suspicion of sepsis is present          If severe sepsis is present AND tissue hypoperfusion perists in the hour after fluid resuscitation or lactate > 4, the patient meets criteria for SEPTIC SHOCK          Are any of the following organ dysfunction criteria present within 6 hours of suspected infection and SIRS criteria that are NOT considered to be chronic conditions?         Organ dysfunction          Date of presentation of severe sepsis          Time of presentation of severe sepsis          Tissue hypoperfusion persists in the hour after crystalloid fluid administration, evidenced, by either:          Was hypotension present within one hour of the conclusion of crystalloid fluid administration?           Date of presentation of septic shock          Time of presentation of septic shock            User Key  (r) = Recorded By, (t) = Taken By, (c) = Cosigned By    [address] Name Provider Vale Ackerman MD Physician                  MDM  Number of Diagnoses or Management Options  Diagnosis management comments: DDX including but not limited to: pneumonia, pleural effusion, CHF, PE, PTX, ACS, MI, asthma exacerbation, COPD exacerbation, anemia, metabolic abnormality, renal failure  Amount and/or Complexity of Data Reviewed  Clinical lab tests: ordered and reviewed  Tests in the radiology section of CPT®: ordered and reviewed  Decide to obtain previous medical records or to obtain history from someone other than the patient: yes  Review and summarize past medical records: yes  Independent visualization of images, tracings, or specimens: yes      CritCare Time    Disposition  Final diagnoses:   Pneumonia     Time reflects when diagnosis was documented in both MDM as applicable and the Disposition within this note     Time User Action Codes Description Comment    7/13/2018  5:29 AM Osmel Massey Add [J18 9] Pneumonia       ED Disposition     ED Disposition Condition Comment    Admit  Case was discussed with HARMAN Montanez and the patient's admission status was agreed to be Admission Status: inpatient status to the service of Dr Chon Bear   Follow-up Information    None         Patient's Medications   Discharge Prescriptions    No medications on file     No discharge procedures on file      ED Provider  Electronically Signed by           Jcarlos Mensah MD  07/13/18 0530       Jcarlos Mensah MD  07/13/18 9363

## 2018-07-13 NOTE — CONSULTS
Consultation - Infectious Disease   Dior Valentin 70 y o  female MRN: [id number]  Unit/Bed#: -01 Encounter: [id number]      IMPRESSION & RECOMMENDATIONS:   Impression/Recommendations: This is a 70 y o  female, presented with 2 week history of cough and was found to have RUL cavitary lesion  Patient has no fever, chills or systemic symptoms  No weight loss  1   RUL cavitary lesion  Except for cough, patient has minimal respiratory symptoms  In addition, she has no fever/chills or systemic/constitutional symptoms  She has no aspiration risk  She has no fever  PCT was normal  Clinically, this does not appear to be pyogenic bacteria pneumonia  This may be viral or atypical pneumonia  Although clinical probability of pulmonary TB is low, given RUL cavitary lesion, we will need to rule it out  With patient having lived in the [place], consider secondary infection of a pre-existing cocci pulmonary cavitation  Lastly, consider pulmonary malignancy but this is not highly likely in a nonsmoker  With patient clinically and systemically well, no indication for systemic antibiotic at present  Discontinue antibiotics  Airborne precaution for now  Check sputum AFB smear and culture x3  Check cocci serologies  Follow up respiratory and blood cultures  Monitor respiratory symptoms  Patient may ultimately need bronchoscopy if above studies are negative  2   Leukocytosis  I suspect this is steroid effect  Patient is clinically and systemically well  No obvious active infection, other than above  Monitor WBC  3   Chronic cough, secondary to above  Discussed with patient in detail regarding above plan  Discussed with Dr Rich Jin from [facility] service  Thank you for this consultation  We will follow along with you  HISTORY OF PRESENT ILLNESS:  Reason for Consult:  Cavitary lung lesion      HPI: Dior Valentin is a 70 y o  female, otherwise healthy, developed cough productive of clear sputum approximately 2 weeks ago  She saw her PCP yesterday and was placed on Tessalon Perles with prednisone  Despite this treatment, cough continued to worsen  Therefore, patient came to the ER for evaluation  On presentation, patient did not have fever but had mild leukocytosis  CXR and chest CT showed RUL cavitary lesion  Patient was admitted  She was started on vancomycin/cefepime  We are asked to evaluate the patient  Except for the cough, patient states she felt quite well at home  On exertion, she would have mild dyspnea  However, no dyspnea at rest   No fever, chills or night sweats  Prior to cough developing 2 weeks ago, patient was perfectly healthy  She smoked briefly in her late teens but has not smoked in over 48 years  No history of coughing with sleeping or eating/drinking  Patient lives in [facility] for a few years, approximately 10 years ago  She does not recall having pneumonia while living there  She has never liver travel to the [place]   No traveling to developing world  On further questioning, patient states that she actually has had a cough for approximately 2 months  Early in the course, her PCP had prescribed Advair for it, without improvement  REVIEW OF SYSTEMS:  A complete 12 point system-based review was done  Except for what is noted in HPI above, ROS of systems is otherwise negative  PAST MEDICAL HISTORY:  History reviewed  No pertinent past medical history  History reviewed  No pertinent surgical history  Problem list reviewed  FAMILY HISTORY:  Non-contributory    SOCIAL HISTORY:  History   Alcohol Use No     History   Drug Use No     History   Smoking Status    Never Smoker   Smokeless Tobacco    Never Used       ALLERGIES:  No Known Allergies    MEDICATIONS:  All current active medications have been reviewed  Patient is currently on vancomycin/cefepime      PHYSICAL EXAM:  Vitals:  HR:  [81-92] 82  Resp:  [16-18] 18  BP: (100-143)/(57-76) 100/57  SpO2:  [94 %-100 %] 95 %  Temp (24hrs), Av 4 °F (36 9 °C), Min:98 1 °F (36 7 °C), Max:98 9 °F (37 2 °C)  Current: Temperature: 98 9 °F (37 2 °C)     Physical Exam:  General:  Well-nourished, well-developed, in no acute distress  Awake, alert and oriented x 3  Eyes:  Conjunctive clear with no hemorrhages or effusions  Oropharynx:  No ulcers, no lesions, pharynx benign, no tonsillitis  Neck:  Supple, no lymphadenopathy, no mass, nontender  Lungs:  Expansion symmetric, no rales, no wheezing, no accessory muscle use  Cardiac:  Regular rate and rhythm, normal S1, normal S2, no murmurs  Abdomen:  Soft, nondistended, non-tender, no HSM  Extremities:  No edema, no erythema, nontender  No ulcers  Skin:  No rashes, no ulcers  Neurological:  Moves all four extremities spontaneously, sensation grossly intact    LABS, IMAGING, & OTHER STUDIES:  Lab Results:  I have personally reviewed pertinent labs  Results from last 7 days  Lab Units 18  0843 18  0137   SODIUM mmol/L 138 137   POTASSIUM mmol/L 3 7 3 9   CHLORIDE mmol/L 105 102   CO2 mmol/L 29 30   ANION GAP mmol/L 4 5   BUN mg/dL 12 18   CREATININE mg/dL 0 71 0 68   EGFR ml/min/1 73sq m 83 85   GLUCOSE RANDOM mg/dL 91 124   CALCIUM mg/dL 9 0 9 6   AST U/L  --  20   ALT U/L  --  28   ALK PHOS U/L  --  88   TOTAL PROTEIN g/dL  --  7 5   BILIRUBIN TOTAL mg/dL  --  0 20       Results from last 7 days  Lab Units 18  0843 18  0137   WBC Thousand/uL 13 69* 12 57*   HEMOGLOBIN g/dL 11 1* 12 0   PLATELETS Thousands/uL 240 266           Imaging Studies:   I have personally reviewed pertinent imaging study reports and images in PACS  CXR reviewed personally  There is RUL infiltrate with cavitation  Chest CT reviewed personally  There is RUL opacity with cavitation  Nodular opacity in LLL  EKG, Pathology, and Other Studies:   I have personally reviewed pertinent reports

## 2018-07-14 PROBLEM — J98.4 CAVITARY LESION OF LUNG: Status: ACTIVE | Noted: 2018-07-13

## 2018-07-14 PROBLEM — J18.9 PNEUMONIA: Status: ACTIVE | Noted: 2018-07-13

## 2018-07-14 LAB
ATRIAL RATE: 78 BPM
ATRIAL RATE: 79 BPM
BASOPHILS # BLD AUTO: 0.02 THOUSANDS/ΜL (ref 0–0.1)
BASOPHILS NFR BLD AUTO: 0 % (ref 0–1)
EOSINOPHIL # BLD AUTO: 0.24 THOUSAND/ΜL (ref 0–0.61)
EOSINOPHIL NFR BLD AUTO: 2 % (ref 0–6)
ERYTHROCYTE [DISTWIDTH] IN BLOOD BY AUTOMATED COUNT: 13.4 % (ref 11.6–15.1)
HCT VFR BLD AUTO: 34.5 % (ref 34.8–46.1)
HGB BLD-MCNC: 11.2 G/DL (ref 11.5–15.4)
LYMPHOCYTES # BLD AUTO: 4.65 THOUSANDS/ΜL (ref 0.6–4.47)
LYMPHOCYTES NFR BLD AUTO: 44 % (ref 14–44)
MCH RBC QN AUTO: 32.2 PG (ref 26.8–34.3)
MCHC RBC AUTO-ENTMCNC: 32.5 G/DL (ref 31.4–37.4)
MCV RBC AUTO: 99 FL (ref 82–98)
MONOCYTES # BLD AUTO: 0.68 THOUSAND/ΜL (ref 0.17–1.22)
MONOCYTES NFR BLD AUTO: 6 % (ref 4–12)
NEUTROPHILS # BLD AUTO: 5.1 THOUSANDS/ΜL (ref 1.85–7.62)
NEUTS SEG NFR BLD AUTO: 48 % (ref 43–75)
P AXIS: 36 DEGREES
P AXIS: 68 DEGREES
PLATELET # BLD AUTO: 244 THOUSANDS/UL (ref 149–390)
PMV BLD AUTO: 10.8 FL (ref 8.9–12.7)
PR INTERVAL: 144 MS
PR INTERVAL: 146 MS
PROCALCITONIN SERPL-MCNC: <0.05 NG/ML
QRS AXIS: -31 DEGREES
QRS AXIS: -32 DEGREES
QRSD INTERVAL: 90 MS
QRSD INTERVAL: 92 MS
QT INTERVAL: 352 MS
QT INTERVAL: 354 MS
QTC INTERVAL: 403 MS
QTC INTERVAL: 403 MS
RBC # BLD AUTO: 3.48 MILLION/UL (ref 3.81–5.12)
T WAVE AXIS: 57 DEGREES
T WAVE AXIS: 63 DEGREES
T3 SERPL-MCNC: 0.9 NG/ML (ref 0.6–1.8)
T4 FREE SERPL-MCNC: 0.92 NG/DL (ref 0.76–1.46)
TSH SERPL DL<=0.05 MIU/L-ACNC: 1.63 UIU/ML (ref 0.36–3.74)
VENTRICULAR RATE: 78 BPM
VENTRICULAR RATE: 79 BPM
WBC # BLD AUTO: 10.69 THOUSAND/UL (ref 4.31–10.16)

## 2018-07-14 PROCEDURE — 84439 ASSAY OF FREE THYROXINE: CPT | Performed by: INTERNAL MEDICINE

## 2018-07-14 PROCEDURE — 87118 MYCOBACTERIC IDENTIFICATION: CPT | Performed by: INTERNAL MEDICINE

## 2018-07-14 PROCEDURE — 84443 ASSAY THYROID STIM HORMONE: CPT | Performed by: INTERNAL MEDICINE

## 2018-07-14 PROCEDURE — 99232 SBSQ HOSP IP/OBS MODERATE 35: CPT | Performed by: INTERNAL MEDICINE

## 2018-07-14 PROCEDURE — 86480 TB TEST CELL IMMUN MEASURE: CPT | Performed by: INTERNAL MEDICINE

## 2018-07-14 PROCEDURE — 84480 ASSAY TRIIODOTHYRONINE (T3): CPT | Performed by: INTERNAL MEDICINE

## 2018-07-14 PROCEDURE — 99222 1ST HOSP IP/OBS MODERATE 55: CPT | Performed by: INTERNAL MEDICINE

## 2018-07-14 PROCEDURE — 87206 SMEAR FLUORESCENT/ACID STAI: CPT | Performed by: INTERNAL MEDICINE

## 2018-07-14 PROCEDURE — 86635 COCCIDIOIDES ANTIBODY: CPT | Performed by: INTERNAL MEDICINE

## 2018-07-14 PROCEDURE — 84145 PROCALCITONIN (PCT): CPT | Performed by: INTERNAL MEDICINE

## 2018-07-14 PROCEDURE — 93010 ELECTROCARDIOGRAM REPORT: CPT | Performed by: INTERNAL MEDICINE

## 2018-07-14 PROCEDURE — 87116 MYCOBACTERIA CULTURE: CPT | Performed by: INTERNAL MEDICINE

## 2018-07-14 PROCEDURE — 85025 COMPLETE CBC W/AUTO DIFF WBC: CPT | Performed by: INTERNAL MEDICINE

## 2018-07-14 RX ADMIN — ESCITALOPRAM OXALATE 20 MG: 20 TABLET ORAL at 08:34

## 2018-07-14 RX ADMIN — GUAIFENESIN 1200 MG: 600 TABLET, EXTENDED RELEASE ORAL at 16:49

## 2018-07-14 RX ADMIN — GUAIFENESIN 1200 MG: 600 TABLET, EXTENDED RELEASE ORAL at 08:34

## 2018-07-14 RX ADMIN — TIMOLOL MALEATE 1 DROP: 2.5 SOLUTION OPHTHALMIC at 08:34

## 2018-07-14 RX ADMIN — BIMATOPROST 1 DROP: 0.1 SOLUTION/ DROPS OPHTHALMIC at 21:35

## 2018-07-14 RX ADMIN — ENOXAPARIN SODIUM 40 MG: 100 INJECTION SUBCUTANEOUS at 08:34

## 2018-07-14 NOTE — CONSULTS
Pulmonary Consultation   Jamal Sanchez 70 y o  female MRN: [id number]  Unit/Bed#: -01 Encounter: [id number]      Reason for consultation:  Right upper lobe infiltrate, with cavity      Impressions/Recommendations:     · The history and appearance of alveolar infiltrates at several locations with a larger cavitary infiltrate in the right upper lobe (in this older woman) could represent mycobacterium avium complex  I otherwise agree with the differential diagnosis as per Dr Mariel Bruner   Sputum for AFB should be helpful for both tuberculosis and MAC, although bronchoscopy would provide a better specimen  Would also check QuantiFERON gold  · I have scheduled bronchoscopy for Monday morning  Risks and benefits were explained and the patient is agreeable  History of Present Illness   HPI:  Jamal Sanchez is a 70 y o  female who has had a six-month history of nonproductive cough  She denies any fevers, chills or weight loss  She had shortness of breath on her presentation to the emergency department and underwent CT angiogram of the chest   No pulmonary emboli were seen, however several areas of alveolar infiltrate were observed, primarily in the periphery  A more dense infiltrate in the right upper lobe was observed, which contained a 1 cm cavity  The patient has a travel history to the [facility]   She denies any history of smoking  She denies any chemical or organic matter exposures  She denies drinking well water or any exposure to lakes or streams  She denies any history of aspiration  Review of systems:  Patient denies headache or vision changes  Weight is stable  Denies sore throat or runny nose  Denies fever, chills or sweats  Denies chest pain or palpitations  Denies nausea, vomiting or diarrhea  Denies lower extremity edema  Denies skin rashes  Denies dysuria or hematuria  All other 12-point review of systems are negative      Historical Information History reviewed  No pertinent past medical history  History reviewed  No pertinent surgical history  Family History   Problem Relation Age of Onset    Dementia Mother     Hypertension Father        Tobacco history:  None (tried cigarettes at age 14)    Family history:  Unremarkable    Meds/Allergies   Current Facility-Administered Medications   Medication Dose Route Frequency    acetaminophen (TYLENOL) tablet 650 mg  650 mg Oral Q6H PRN    bimatoprost (LUMIGAN) 0 01 % ophthalmic solution 1 drop  1 drop Both Eyes HS    enoxaparin (LOVENOX) subcutaneous injection 40 mg  40 mg Subcutaneous Daily    escitalopram (LEXAPRO) tablet 20 mg  20 mg Oral Daily    guaiFENesin (MUCINEX) 12 hr tablet 1,200 mg  1,200 mg Oral BID    ondansetron (ZOFRAN) injection 4 mg  4 mg Intravenous Q6H PRN    timolol (TIMOPTIC) 0 25 % ophthalmic solution 1 drop  1 drop Left Eye Daily     No Known Allergies    Vitals: Blood pressure 114/67, pulse 67, temperature 98 4 °F (36 9 °C), temperature source Oral, resp  rate 18, height 5' 4" (1 626 m), weight 52 2 kg (115 lb 1 3 oz), SpO2 96 %  , Body mass index is 19 75 kg/m²      Intake/Output Summary (Last 24 hours) at 07/14/18 0953  Last data filed at 07/13/18 1910   Gross per 24 hour   Intake              180 ml   Output              200 ml   Net              -20 ml       Physical exam:    General Appearance:    Alert, cooperative, no conversational dyspnea or   accessory muscle use       Head/eyes:    Normocephalic, without obvious abnormality, atraumatic,         PERRL, extraocular muscles intact, no scleral icterus                Lungs:     Clear bilaterally   Chest Wall:    No tenderness or deformity    Heart:    Regular rate and rhythm, S1 and S2 normal, no murmur, rub   or gallop       Extremities:   Extremities normal, atraumatic, no cyanosis or edema   Skin:   Warm, dry, turgor normal, no rashes or lesions       Neurologic:   CNII-XII intact, normal strength, non-focal Results from last 7 days  Lab Units 07/14/18  0544 07/13/18  0843 07/13/18  0137   WBC Thousand/uL 10 69* 13 69* 12 57*   HEMOGLOBIN g/dL 11 2* 11 1* 12 0   HEMATOCRIT % 34 5* 34 7* 37 6   PLATELETS Thousands/uL 244 240 266           Results from last 7 days  Lab Units 07/13/18  0843 07/13/18  0137   SODIUM mmol/L 138 137   POTASSIUM mmol/L 3 7 3 9   CHLORIDE mmol/L 105 102   CO2 mmol/L 29 30   BUN mg/dL 12 18   CREATININE mg/dL 0 71 0 68   CALCIUM mg/dL 9 0 9 6   TOTAL PROTEIN g/dL  --  7 5   BILIRUBIN TOTAL mg/dL  --  0 20   ALK PHOS U/L  --  88   ALT U/L  --  28   AST U/L  --  20   GLUCOSE RANDOM mg/dL 91 124       Results from last 7 days  Lab Units 07/13/18  0137   INR  1 01   PTT seconds 30             Imaging and other studies: I have personally reviewed pertinent films in PACS and my observations are recorded above      Code Status: Level 1 - Full Code    Thank you for allowing us to participate in the care of your patient      Robert Denson MD

## 2018-07-14 NOTE — PROGRESS NOTES
Progress Note - Infectious Disease   García Lui 70 y o  female MRN: [id number]  Unit/Bed#: -01 Encounter: [id number]      Impression/Recommendations:  1   RUL cavitary lesion  Minimal respiratory symptoms except for chronic cough  Lack of fever and normal PCT make bactereial process unlikely  Consider fungal pneumonia, especially Cocci given prior residence in [place]   Consider TB although no risk factors  Consider MAC  Consider pulmonary malignancy  Clinically stable and nontoxic  Continue to follow closely off antibiotics  Airborne precaution for now  Check sputum AFB smear x3  Needs 2 more  Follow up cocci serologies  Follow up respiratory and blood cultures  Monitor respiratory symptoms  Bronch Monday per pulmonary     2   Leukocytosis  I suspect this is steroid effect  Patient is clinically and systemically well  No obvious active infection, other than above  Monitor WBC      3  Chronic cough, secondary to above  Antibiotics:  None    Subjective:  Patient seen on AM rounds  Feels well and offers no new complaints  Intermittent    24 Hour Events:  No documented fevers, chills, sweats, nausea, vomiting, or diarrhea  Objective:  Vitals:  HR:  [64-82] 67  Resp:  [18] 18  BP: ()/(55-67) 114/67  SpO2:  [94 %-96 %] 96 %  Temp (24hrs), Av 3 °F (36 8 °C), Min:97 7 °F (36 5 °C), Max:98 9 °F (37 2 °C)  Current: Temperature: 98 4 °F (36 9 °C)    Physical Exam:   General:  No acute distress  Psychiatric:  Awake and alert  Pulmonary:  Normal respiratory excursion without accessory muscle use  Abdomen:  Soft, nontender  Extremities:  No edema  Skin:  No rashes    Lab Results:  I have personally reviewed pertinent labs      Results from last 7 days  Lab Units 18  0843 18  0137   SODIUM mmol/L 138 137   POTASSIUM mmol/L 3 7 3 9   CHLORIDE mmol/L 105 102   CO2 mmol/L 29 30   ANION GAP mmol/L 4 5   BUN mg/dL 12 18   CREATININE mg/dL 0 71 0 68   EGFR ml/min/1 73sq m 83 85 GLUCOSE RANDOM mg/dL 91 124   CALCIUM mg/dL 9 0 9 6   AST U/L  --  20   ALT U/L  --  28   ALK PHOS U/L  --  88   TOTAL PROTEIN g/dL  --  7 5   BILIRUBIN TOTAL mg/dL  --  0 20       Results from last 7 days  Lab Units 07/14/18  0544 07/13/18  0843 07/13/18  0137   WBC Thousand/uL 10 69* 13 69* 12 57*   HEMOGLOBIN g/dL 11 2* 11 1* 12 0   PLATELETS Thousands/uL 244 240 266       Results from last 7 days  Lab Units 07/13/18  1130 07/13/18  0138 07/13/18  0137   BLOOD CULTURE   --  No Growth at 24 hrs  No Growth at 24 hrs  LEGIONELLA URINARY ANTIGEN  Negative  --   --        Imaging Studies:   I have personally reviewed pertinent imaging study reports and images in PACS  EKG, Pathology, and Other Studies:   I have personally reviewed pertinent reports

## 2018-07-14 NOTE — PROGRESS NOTES
[facility] Internal Medicine Progress Note  Patient: Chase Schmidt 70 y o  female   MRN: [id number]  PCP: Alessandro Buitrago MD  Unit/Bed#: MS Ziggy-Ed Encounter: [id number]  Date Of Visit: 07/14/18    Assessment:    Active Problems:    Pneumonia of right upper lobe due to infectious organism ([address] )    Pneumonia    Cavitary lesion of lung      Plan:    · Cough with RUL Cavitary Lesion -   · Rule out TB - AFB culture x 3 ordered  Quantiferon gold recommended by pulmonary  Bronchoscopy on Monday will give additional results  · Potential for malignancy - Pulmonary on board and feesl less likely to be tumor  Bronchoscopy for Monday Morning  · Possible Pneumonia - negative procalcitonin x 2  Monitor off antibiotics for any fevers or worsening symptoms  CBCD in am   Follow up blood cultures, sputum cultures  · Lived in [facility] in past so must keep coccidimycosis in mind  · Supportive care - Mucinex  · Oxygen - on room air, has good O2 sats  · Incidental Finding: Pulmonary Nodule - CT scan in 6 months  But will also get pulmonary opinion this admission  · Incidental Finding: thyroid nodule - outpatient thyroid ultrasound  Thyroid function studies within normal limits  · Depression - will continue on lexapro     VTE Pharmacologic Prophylaxis:   Pharmacologic: Enoxaparin (Lovenox)  Mechanical VTE Prophylaxis in Place: No    Patient Centered Rounds: I have performed bedside rounds with nursing staff today  Discussions with Specialists or Other Care Team Provider: None    Education and Discussions with Family / Patient: Patient only  Time Spent for Care: 30 minutes  More than 50% of total time spent on counseling and coordination of care as described above  Current Length of Stay: 1 day(s)    Current Patient Status: Inpatient   Certification Statement: The patient will continue to require additional inpatient hospital stay due to evaluation and treatment for cavitary lesion of lung      Discharge Plan / Estimated Discharge Date: to be determined    Code Status: Level 1 - Full Code      Subjective: The patient feels that her breathing is doing much better  She states I feel like going out for run  The patient has no significant coughing right now but sometimes a minor mostly nonproductive cough  No sore throat  The patient has had no fevers or chills  No sweats currently  The patient overall feels much better since she has come in  No chest pain or palpitations  She is concerned about the cavitary lesion  Objective:     Vitals:   Temp (24hrs), Av 2 °F (36 8 °C), Min:97 7 °F (36 5 °C), Max:98 5 °F (36 9 °C)    HR:  [64-76] 76  Resp:  [16-18] 16  BP: ()/(55-74) 118/74  SpO2:  [94 %-96 %] 95 %  Body mass index is 19 75 kg/m²  Input and Output Summary (last 24 hours): Intake/Output Summary (Last 24 hours) at 18 1830  Last data filed at 18 1910   Gross per 24 hour   Intake                0 ml   Output              200 ml   Net             -200 ml       Physical Exam:     Physical Exam   Constitutional: She is oriented to person, place, and time  HENT:   Mouth/Throat: Oropharynx is clear and moist  No oropharyngeal exudate  Eyes: Conjunctivae are normal  Pupils are equal, round, and reactive to light  Cardiovascular: Normal rate, regular rhythm and intact distal pulses  No murmur heard  Pulmonary/Chest: Effort normal and breath sounds normal  No respiratory distress  She has no wheezes  She has no rales  No rhonchi   Abdominal: Soft  Bowel sounds are normal  She exhibits no distension  There is no tenderness  Musculoskeletal: She exhibits no edema or tenderness  Lymphadenopathy:     She has no cervical adenopathy  Neurological: She is alert and oriented to person, place, and time       Additional Data:     Labs:      Results from last 7 days  Lab Units 18  0544   WBC Thousand/uL 10 69*   HEMOGLOBIN g/dL 11 2*   HEMATOCRIT % 34 5*   PLATELETS Thousands/uL 244   NEUTROS PCT % 48   LYMPHS PCT % 44   MONOS PCT % 6   EOS PCT % 2       Results from last 7 days  Lab Units 07/13/18  0843 07/13/18  0137   SODIUM mmol/L 138 137   POTASSIUM mmol/L 3 7 3 9   CHLORIDE mmol/L 105 102   CO2 mmol/L 29 30   BUN mg/dL 12 18   CREATININE mg/dL 0 71 0 68   CALCIUM mg/dL 9 0 9 6   TOTAL PROTEIN g/dL  --  7 5   BILIRUBIN TOTAL mg/dL  --  0 20   ALK PHOS U/L  --  88   ALT U/L  --  28   AST U/L  --  20   GLUCOSE RANDOM mg/dL 91 124       Results from last 7 days  Lab Units 07/13/18  0137   INR  1 01       * I Have Reviewed All Lab Data Listed Above  * Additional Pertinent Lab Tests Reviewed: Cheryl Aviles Admission Reviewed    Imaging:    Imaging Reports Reviewed Today Include: all imaging for admission  Imaging Personally Reviewed by Myself Includes:  None    Recent Cultures (last 7 days):       Results from last 7 days  Lab Units 07/13/18  1136 07/13/18  1130 07/13/18  0138 07/13/18  0137   BLOOD CULTURE   --   --  No Growth at 24 hrs  No Growth at 24 hrs  SPUTUM CULTURE  Culture too young- will reincubate  --   --   --    GRAM STAIN RESULT  2+ Epithelial Cells  3+ Polys  3+ Gram positive cocci in pairs and chains  1+ Gram positive rods  --   --   --    LEGIONELLA URINARY ANTIGEN   --  Negative  --   --        Last 24 Hours Medication List:     Current Facility-Administered Medications:  acetaminophen 650 mg Oral Q6H PRN David Meadows PA-C   bimatoprost 1 drop Both Eyes HS David Meadows PA-C   enoxaparin 40 mg Subcutaneous Daily HARMAN Live-AZEEM   escitalopram 20 mg Oral Daily David Meadows PA-C   guaiFENesin 1,200 mg Oral BID Romayne Rink, DO   ondansetron 4 mg Intravenous Q6H PRN David Meadows PA-C   timolol 1 drop Left Eye Daily David Meadows PA-C        Today, Patient Was Seen By: Romayne Rink, DO    ** Please Note: This note has been constructed using a voice recognition system   **

## 2018-07-15 PROBLEM — D72.829 LEUKOCYTOSIS: Status: ACTIVE | Noted: 2018-07-15

## 2018-07-15 PROBLEM — E04.1 THYROID NODULE: Status: ACTIVE | Noted: 2018-07-15

## 2018-07-15 PROBLEM — R91.1 PULMONARY NODULE: Status: ACTIVE | Noted: 2018-07-15

## 2018-07-15 PROBLEM — R05.3 CHRONIC COUGH: Status: ACTIVE | Noted: 2018-07-15

## 2018-07-15 PROBLEM — F32.A DEPRESSION: Status: ACTIVE | Noted: 2018-07-15

## 2018-07-15 LAB
BACTERIA SPT RESP CULT: NORMAL
BASOPHILS # BLD AUTO: 0.02 THOUSANDS/ΜL (ref 0–0.1)
BASOPHILS NFR BLD AUTO: 0 % (ref 0–1)
EOSINOPHIL # BLD AUTO: 0.31 THOUSAND/ΜL (ref 0–0.61)
EOSINOPHIL NFR BLD AUTO: 3 % (ref 0–6)
ERYTHROCYTE [DISTWIDTH] IN BLOOD BY AUTOMATED COUNT: 13.2 % (ref 11.6–15.1)
GRAM STN SPEC: NORMAL
HCT VFR BLD AUTO: 37.2 % (ref 34.8–46.1)
HGB BLD-MCNC: 12 G/DL (ref 11.5–15.4)
LYMPHOCYTES # BLD AUTO: 5.63 THOUSANDS/ΜL (ref 0.6–4.47)
LYMPHOCYTES NFR BLD AUTO: 47 % (ref 14–44)
MCH RBC QN AUTO: 31.7 PG (ref 26.8–34.3)
MCHC RBC AUTO-ENTMCNC: 32.3 G/DL (ref 31.4–37.4)
MCV RBC AUTO: 98 FL (ref 82–98)
MONOCYTES # BLD AUTO: 0.92 THOUSAND/ΜL (ref 0.17–1.22)
MONOCYTES NFR BLD AUTO: 8 % (ref 4–12)
NEUTROPHILS # BLD AUTO: 5.06 THOUSANDS/ΜL (ref 1.85–7.62)
NEUTS SEG NFR BLD AUTO: 42 % (ref 43–75)
PLATELET # BLD AUTO: 262 THOUSANDS/UL (ref 149–390)
PMV BLD AUTO: 10.8 FL (ref 8.9–12.7)
RBC # BLD AUTO: 3.79 MILLION/UL (ref 3.81–5.12)
WBC # BLD AUTO: 11.94 THOUSAND/UL (ref 4.31–10.16)

## 2018-07-15 PROCEDURE — 93005 ELECTROCARDIOGRAM TRACING: CPT

## 2018-07-15 PROCEDURE — 85025 COMPLETE CBC W/AUTO DIFF WBC: CPT | Performed by: INTERNAL MEDICINE

## 2018-07-15 PROCEDURE — 99232 SBSQ HOSP IP/OBS MODERATE 35: CPT | Performed by: INTERNAL MEDICINE

## 2018-07-15 RX ADMIN — GUAIFENESIN 1200 MG: 600 TABLET, EXTENDED RELEASE ORAL at 07:24

## 2018-07-15 RX ADMIN — GUAIFENESIN 1200 MG: 600 TABLET, EXTENDED RELEASE ORAL at 17:00

## 2018-07-15 RX ADMIN — BIMATOPROST 1 DROP: 0.1 SOLUTION/ DROPS OPHTHALMIC at 21:34

## 2018-07-15 RX ADMIN — TIMOLOL MALEATE 1 DROP: 2.5 SOLUTION OPHTHALMIC at 07:24

## 2018-07-15 RX ADMIN — ESCITALOPRAM OXALATE 20 MG: 20 TABLET ORAL at 07:24

## 2018-07-15 NOTE — PROGRESS NOTES
Progress Note - Infectious Disease   Jamin Ramírez 70 y o  female MRN: [id number]  Unit/Bed#: -01 Encounter: [id number]      Impression/Recommendations:  1   RUL cavitary lesion   Minimal respiratory symptoms except for chronic cough  Lack of fever and normal PCT make bactereial process unlikely  Consider fungal pneumonia, especially Cocci given prior residence in [facility]  TB although no risk factors  Consider MAC  Consider pulmonary malignancy  Clinically stable and nontoxic  Continue to follow closely off antibiotics  Continue airborne precaution for now until AFB negative x3 (all specimens received and are pending in lab)  Follow up cocci serologies  Follow up respiratory and blood cultures     Monitor respiratory symptoms  Bronch Monday per pulmonary     2   Leukocytosis   Possibly due to recent steroids   Patient is clinically and systemically well   No obvious active infection, other than above  Improving  Monitor WBC      3   Chronic cough, secondary to above      Discussed in detail with the patient and Dr Jonathon Lee  Antibiotics:  None    Subjective:  Patient seen on AM rounds  Feels well and offers no new complaints  Denies fevers, chills, sweats, nausea, vomiting, or diarrhea  24 Hour Events:  No documented fevers, chills, sweats, nausea, vomiting, or diarrhea  All 3 AFBs received in lab  Objective:  Vitals:  HR:  [69-76] 69  Resp:  [16-19] 19  BP: ()/(56-74) 97/56  SpO2:  [95 %-96 %] 96 %  Temp (24hrs), Av 2 °F (36 8 °C), Min:98 1 °F (36 7 °C), Max:98 5 °F (36 9 °C)  Current: Temperature: 98 1 °F (36 7 °C)    Physical Exam:   General:  No acute distress, ambulating in the room  Psychiatric:  Awake and alert  Pulmonary:  Normal respiratory excursion without accessory muscle use  Abdomen:  Soft, nontender  Extremities:  No edema  Skin:  No rashes    Lab Results:  I have personally reviewed pertinent labs      Results from last 7 days  Lab Units 18  0861 07/13/18  0137   SODIUM mmol/L 138 137   POTASSIUM mmol/L 3 7 3 9   CHLORIDE mmol/L 105 102   CO2 mmol/L 29 30   ANION GAP mmol/L 4 5   BUN mg/dL 12 18   CREATININE mg/dL 0 71 0 68   EGFR ml/min/1 73sq m 83 85   GLUCOSE RANDOM mg/dL 91 124   CALCIUM mg/dL 9 0 9 6   AST U/L  --  20   ALT U/L  --  28   ALK PHOS U/L  --  88   TOTAL PROTEIN g/dL  --  7 5   BILIRUBIN TOTAL mg/dL  --  0 20       Results from last 7 days  Lab Units 07/15/18  0619 07/14/18  0544 07/13/18  0843   WBC Thousand/uL 11 94* 10 69* 13 69*   HEMOGLOBIN g/dL 12 0 11 2* 11 1*   PLATELETS Thousands/uL 262 244 240       Results from last 7 days  Lab Units 07/13/18  1136 07/13/18  1130 07/13/18  0138 07/13/18  0137   BLOOD CULTURE   --   --  No Growth at 48 hrs  No Growth at 48 hrs  SPUTUM CULTURE  Culture too young- will reincubate  --   --   --    GRAM STAIN RESULT  2+ Epithelial Cells  3+ Polys  3+ Gram positive cocci in pairs and chains  1+ Gram positive rods  --   --   --    LEGIONELLA URINARY ANTIGEN   --  Negative  --   --        Imaging Studies:   I have personally reviewed pertinent imaging study reports and images in PACS  EKG, Pathology, and Other Studies:   I have personally reviewed pertinent reports

## 2018-07-15 NOTE — PROGRESS NOTES
[facility] Internal Medicine Progress Note  Patient: Cecil Hurt 70 y o  female   MRN: [id number]  PCP: Skyler Bowers MD  Unit/Bed#: MS Ziggy-Ed Encounter: [id number]  Date Of Visit: 07/15/18    Assessment:    Principal Problem:    Cavitary lesion of lung  Active Problems:    Depression    Thyroid nodule    Pulmonary nodule    Chronic cough    Leukocytosis      Plan:    · Cough with RUL Cavitary Lesion -   · Rule out TB -   · Follow up AFB culture x 3  If negative, then patient can be cleared to discharge  · Follow up Quantiferon gold ordered by pulmonary  Bronchoscopy on Monday will allow for additional testing  · Potential for malignancy - Pulmonary on board and feels less likely to be tumor  Bronchoscopy for Monday Morning  · Rule out Fungal Etiology - Testing for coccidiomycosis, histoplasmosis, or others  Fungal culture  · Unlikely Pneumonia - negative procalcitonin x 2 and sputum shows only mixed respiratory lilian  Monitor off antibiotics for any fevers or worsening symptoms  Follow up blood cultures  · Supportive care - Mucinex  · Oxygen - on room air, has good O2 sats  · Incidental Finding: Pulmonary Nodule - CT scan in 6 months  But will also get pulmonary opinion this admission  · Incidental Finding: thyroid nodule - outpatient thyroid ultrasound  Thyroid function studies within normal limits  · Depression - will continue on lexapro     VTE Pharmacologic Prophylaxis:   Pharmacologic: Enoxaparin (Lovenox)  Mechanical VTE Prophylaxis in Place: No    Patient Centered Rounds: I have performed bedside rounds with nursing staff today  Discussions with Specialists or Other Care Team Provider: Dr Florecita Smith    Education and Discussions with Family / Patient: Patient only  Time Spent for Care: 30 minutes  More than 50% of total time spent on counseling and coordination of care as described above      Current Length of Stay: 2 day(s)    Current Patient Status: Inpatient Certification Statement: The patient will continue to require additional inpatient hospital stay due to evaluation and treatment for cavitary lesion of lung  Discharge Plan / Estimated Discharge Date: to be determined    Code Status: Level 1 - Full Code      Subjective: The patient denies any hemoptysis, dyspnea, and has had no change in her cough  The patient reports the cough is only mild and intermittent  Patient denies any chest pain  No fevers or chills and no night sweats  Objective:     Vitals:   Temp (24hrs), Av 2 °F (36 8 °C), Min:98 1 °F (36 7 °C), Max:98 5 °F (36 9 °C)    HR:  [69-76] 69  Resp:  [16-19] 19  BP: ()/(56-74) 97/56  SpO2:  [95 %-96 %] 96 %  Body mass index is 19 75 kg/m²  Input and Output Summary (last 24 hours): Intake/Output Summary (Last 24 hours) at 07/15/18 1321  Last data filed at 07/15/18 0900   Gross per 24 hour   Intake              240 ml   Output                0 ml   Net              240 ml       Physical Exam:     Physical Exam   Constitutional: No distress  HENT:   Mouth/Throat: Oropharynx is clear and moist  No oropharyngeal exudate  Eyes: Conjunctivae are normal  Pupils are equal, round, and reactive to light  Neck: Neck supple  Cardiovascular: Normal rate and regular rhythm  Exam reveals no gallop and no friction rub  No murmur heard  Pulmonary/Chest: Effort normal and breath sounds normal  She has no wheezes  She has no rales  Abdominal: Soft  Bowel sounds are normal  She exhibits no distension  There is no tenderness  Musculoskeletal: She exhibits no edema or tenderness  Vitals reviewed      Additional Data:     Labs:      Results from last 7 days  Lab Units 07/15/18  0619   WBC Thousand/uL 11 94*   HEMOGLOBIN g/dL 12 0   HEMATOCRIT % 37 2   PLATELETS Thousands/uL 262   NEUTROS PCT % 42*   LYMPHS PCT % 47*   MONOS PCT % 8   EOS PCT % 3       Results from last 7 days  Lab Units 18  0843 18  0137   SODIUM mmol/L 138 137   POTASSIUM mmol/L 3 7 3 9   CHLORIDE mmol/L 105 102   CO2 mmol/L 29 30   BUN mg/dL 12 18   CREATININE mg/dL 0 71 0 68   CALCIUM mg/dL 9 0 9 6   TOTAL PROTEIN g/dL  --  7 5   BILIRUBIN TOTAL mg/dL  --  0 20   ALK PHOS U/L  --  88   ALT U/L  --  28   AST U/L  --  20   GLUCOSE RANDOM mg/dL 91 124       Results from last 7 days  Lab Units 07/13/18  0137   INR  1 01       * I Have Reviewed All Lab Data Listed Above  * Additional Pertinent Lab Tests Reviewed: All Labs Within Last 24 Hours Reviewed    Imaging:    Imaging Reports Reviewed Today Include: No new imaging  Imaging Personally Reviewed by Myself Includes:  None    Recent Cultures (last 7 days):       Results from last 7 days  Lab Units 07/13/18  1136 07/13/18  1130 07/13/18  0138 07/13/18  0137   BLOOD CULTURE   --   --  No Growth at 48 hrs  No Growth at 48 hrs  SPUTUM CULTURE  2+ Growth of   --   --   --    GRAM STAIN RESULT  2+ Epithelial Cells  3+ Polys  3+ Gram positive cocci in pairs and chains  1+ Gram positive rods  --   --   --    LEGIONELLA URINARY ANTIGEN   --  Negative  --   --        Last 24 Hours Medication List:     Current Facility-Administered Medications:  acetaminophen 650 mg Oral Q6H PRN Luana Nguyen PA-C   bimatoprost 1 drop Both Eyes HS Luana Nguyen PA-C   enoxaparin 40 mg Subcutaneous Daily Luana Nguyen PA-C   escitalopram 20 mg Oral Daily Luana Nguyen PA-C   guaiFENesin 1,200 mg Oral BID Kristina Aquino DO   ondansetron 4 mg Intravenous Q6H PRN Luana Nguyen PA-C   timolol 1 drop Left Eye Daily Luana Nguyen PA-C        Today, Patient Was Seen By: Kristina Aquino DO    ** Please Note: This note has been constructed using a voice recognition system   **

## 2018-07-16 ENCOUNTER — ANESTHESIA EVENT (INPATIENT)
Dept: GASTROENTEROLOGY | Facility: HOSPITAL | Age: 78
DRG: 168 | End: 2018-07-16
Payer: MEDICARE

## 2018-07-16 ENCOUNTER — ANESTHESIA (INPATIENT)
Dept: GASTROENTEROLOGY | Facility: HOSPITAL | Age: 78
DRG: 168 | End: 2018-07-16
Payer: MEDICARE

## 2018-07-16 VITALS
OXYGEN SATURATION: 94 % | HEIGHT: 64 IN | BODY MASS INDEX: 19.65 KG/M2 | HEART RATE: 81 BPM | RESPIRATION RATE: 20 BRPM | WEIGHT: 115.08 LBS | TEMPERATURE: 98.3 F | DIASTOLIC BLOOD PRESSURE: 68 MMHG | SYSTOLIC BLOOD PRESSURE: 124 MMHG

## 2018-07-16 LAB
ATRIAL RATE: 70 BPM
P AXIS: 55 DEGREES
PR INTERVAL: 140 MS
QRS AXIS: -9 DEGREES
QRSD INTERVAL: 90 MS
QT INTERVAL: 390 MS
QTC INTERVAL: 421 MS
T WAVE AXIS: 51 DEGREES
VENTRICULAR RATE: 70 BPM

## 2018-07-16 PROCEDURE — 87106 FUNGI IDENTIFICATION YEAST: CPT | Performed by: INTERNAL MEDICINE

## 2018-07-16 PROCEDURE — 0B9D8ZX DRAINAGE OF RIGHT MIDDLE LUNG LOBE, VIA NATURAL OR ARTIFICIAL OPENING ENDOSCOPIC, DIAGNOSTIC: ICD-10-PCS | Performed by: INTERNAL MEDICINE

## 2018-07-16 PROCEDURE — 87206 SMEAR FLUORESCENT/ACID STAI: CPT | Performed by: INTERNAL MEDICINE

## 2018-07-16 PROCEDURE — 99232 SBSQ HOSP IP/OBS MODERATE 35: CPT | Performed by: INTERNAL MEDICINE

## 2018-07-16 PROCEDURE — 87070 CULTURE OTHR SPECIMN AEROBIC: CPT | Performed by: INTERNAL MEDICINE

## 2018-07-16 PROCEDURE — 88305 TISSUE EXAM BY PATHOLOGIST: CPT | Performed by: PATHOLOGY

## 2018-07-16 PROCEDURE — 88112 CYTOPATH CELL ENHANCE TECH: CPT | Performed by: PATHOLOGY

## 2018-07-16 PROCEDURE — 0B9C8ZX DRAINAGE OF RIGHT UPPER LUNG LOBE, VIA NATURAL OR ARTIFICIAL OPENING ENDOSCOPIC, DIAGNOSTIC: ICD-10-PCS | Performed by: INTERNAL MEDICINE

## 2018-07-16 PROCEDURE — 99239 HOSP IP/OBS DSCHRG MGMT >30: CPT | Performed by: INTERNAL MEDICINE

## 2018-07-16 PROCEDURE — 87102 FUNGUS ISOLATION CULTURE: CPT | Performed by: INTERNAL MEDICINE

## 2018-07-16 PROCEDURE — 31624 DX BRONCHOSCOPE/LAVAGE: CPT | Performed by: INTERNAL MEDICINE

## 2018-07-16 PROCEDURE — 89051 BODY FLUID CELL COUNT: CPT | Performed by: PATHOLOGY

## 2018-07-16 PROCEDURE — 93010 ELECTROCARDIOGRAM REPORT: CPT | Performed by: INTERNAL MEDICINE

## 2018-07-16 PROCEDURE — 87116 MYCOBACTERIA CULTURE: CPT | Performed by: INTERNAL MEDICINE

## 2018-07-16 RX ORDER — SODIUM CHLORIDE 9 MG/ML
INJECTION, SOLUTION INTRAVENOUS CONTINUOUS PRN
Status: DISCONTINUED | OUTPATIENT
Start: 2018-07-16 | End: 2018-07-16 | Stop reason: SURG

## 2018-07-16 RX ORDER — KETAMINE HYDROCHLORIDE 50 MG/ML
INJECTION, SOLUTION, CONCENTRATE INTRAMUSCULAR; INTRAVENOUS AS NEEDED
Status: DISCONTINUED | OUTPATIENT
Start: 2018-07-16 | End: 2018-07-16 | Stop reason: SURG

## 2018-07-16 RX ORDER — FENTANYL CITRATE 50 UG/ML
INJECTION, SOLUTION INTRAMUSCULAR; INTRAVENOUS AS NEEDED
Status: DISCONTINUED | OUTPATIENT
Start: 2018-07-16 | End: 2018-07-16 | Stop reason: SURG

## 2018-07-16 RX ORDER — GUAIFENESIN 1200 MG/1
1200 TABLET, EXTENDED RELEASE ORAL 2 TIMES DAILY
Qty: 14 TABLET | Refills: 0 | Status: SHIPPED | OUTPATIENT
Start: 2018-07-16 | End: 2018-07-23

## 2018-07-16 RX ORDER — PROPOFOL 10 MG/ML
INJECTION, EMULSION INTRAVENOUS CONTINUOUS PRN
Status: DISCONTINUED | OUTPATIENT
Start: 2018-07-16 | End: 2018-07-16 | Stop reason: SURG

## 2018-07-16 RX ORDER — LIDOCAINE HYDROCHLORIDE 40 MG/ML
SOLUTION TOPICAL AS NEEDED
Status: DISCONTINUED | OUTPATIENT
Start: 2018-07-16 | End: 2018-07-16 | Stop reason: HOSPADM

## 2018-07-16 RX ORDER — GLYCOPYRROLATE 0.2 MG/ML
INJECTION INTRAMUSCULAR; INTRAVENOUS AS NEEDED
Status: DISCONTINUED | OUTPATIENT
Start: 2018-07-16 | End: 2018-07-16 | Stop reason: SURG

## 2018-07-16 RX ORDER — PROPOFOL 10 MG/ML
INJECTION, EMULSION INTRAVENOUS AS NEEDED
Status: DISCONTINUED | OUTPATIENT
Start: 2018-07-16 | End: 2018-07-16 | Stop reason: SURG

## 2018-07-16 RX ORDER — ACETAMINOPHEN 325 MG/1
650 TABLET ORAL EVERY 6 HOURS PRN
Qty: 30 TABLET | Refills: 0
Start: 2018-07-16 | End: 2019-07-11 | Stop reason: ALTCHOICE

## 2018-07-16 RX ADMIN — PROPOFOL 50 MG: 10 INJECTION, EMULSION INTRAVENOUS at 15:11

## 2018-07-16 RX ADMIN — FENTANYL CITRATE 50 MCG: 50 INJECTION INTRAMUSCULAR; INTRAVENOUS at 15:05

## 2018-07-16 RX ADMIN — GLYCOPYRROLATE 0.2 MG: 0.2 INJECTION, SOLUTION INTRAMUSCULAR; INTRAVENOUS at 14:58

## 2018-07-16 RX ADMIN — FENTANYL CITRATE 50 MCG: 50 INJECTION INTRAMUSCULAR; INTRAVENOUS at 15:11

## 2018-07-16 RX ADMIN — PROPOFOL 80 MCG/KG/MIN: 10 INJECTION, EMULSION INTRAVENOUS at 15:11

## 2018-07-16 RX ADMIN — KETAMINE HYDROCHLORIDE 25 MG: 50 INJECTION INTRAMUSCULAR; INTRAVENOUS at 15:11

## 2018-07-16 RX ADMIN — TIMOLOL MALEATE 1 DROP: 2.5 SOLUTION OPHTHALMIC at 09:33

## 2018-07-16 RX ADMIN — SODIUM CHLORIDE: 0.9 INJECTION, SOLUTION INTRAVENOUS at 14:45

## 2018-07-16 RX ADMIN — KETAMINE HYDROCHLORIDE 25 MG: 50 INJECTION INTRAMUSCULAR; INTRAVENOUS at 15:05

## 2018-07-16 NOTE — PROGRESS NOTES
Progress Note - Infectious Disease   Don Allen 70 y o  female MRN: [id number]  Unit/Bed#: -01 Encounter: [id number]      Impression/Plan:  1   RUL cavitary lesion   Minimal respiratory symptoms except for chronic cough  Lack of fever and normal PCT make bactereial process unlikely  Sputum culture only showing mixed lilian  Consider viral etiology  Consider fungal pneumonia, especially cocci given prior residence in [facility] for TB, concern for MAC, however, all AFB results are preliminarily negative  Quant gold is pending  Consider pulmonary malignancy  Patient is clinically stable and nontoxic  She will be going for bronchoscopy later today   -continue to follow closely off antibiotics  -continue airborne precaution for now until AFB negative x3 (all specimens are preliminarily negative so far)  -follow up cocci serologies  -follow up respiratory and blood cultures   -follow up quant gold  -follow up bronchoscopy results  -monitor respiratory symptoms     2   Leukocytosis   Possibly due to recent steroids   Patient is clinically and systemically well   No obvious active infection, other than above    -monitor WBC      3   Chronic cough, secondary to above  Antibiotics:  No current antibiotic use    Subjective:  Patient has no fever, chills, sweats; no nausea, vomiting, diarrhea; reports a minimal dry cough today, no shortness of breath; no pain  No new symptoms  Objective:  Vitals:  HR:  [72-79] 72  Resp:  [16-18] 18  BP: (102-118)/(71-80) 109/71  SpO2:  [95 %-97 %] 97 %  Temp (24hrs), Av 6 °F (36 4 °C), Min:97 2 °F (36 2 °C), Max:97 9 °F (36 6 °C)  Current: Temperature: 97 9 °F (36 6 °C)    Physical Exam:   General Appearance:  Alert, interactive, nontoxic, no acute distress  Throat: Oropharynx dry without lesions      Lungs:   Clear to auscultation bilaterally; no wheezes, rhonchi or rales; respirations unlabored   Heart:  RRR; no murmur, rub or gallop   Abdomen:   Soft, non-tender, non-distended, positive bowel sounds  Extremities: No clubbing or cyanosis, no edema   Skin: No new rashes or lesions  No draining wounds noted  Labs, Imaging, & Other studies:   All pertinent labs and imaging studies were personally reviewed    Results from last 7 days  Lab Units 07/15/18  0619 07/14/18  0544 07/13/18  0843   WBC Thousand/uL 11 94* 10 69* 13 69*   HEMOGLOBIN g/dL 12 0 11 2* 11 1*   PLATELETS Thousands/uL 262 244 240       Results from last 7 days  Lab Units 07/13/18  0843 07/13/18  0137   SODIUM mmol/L 138 137   POTASSIUM mmol/L 3 7 3 9   CHLORIDE mmol/L 105 102   CO2 mmol/L 29 30   ANION GAP mmol/L 4 5   BUN mg/dL 12 18   CREATININE mg/dL 0 71 0 68   EGFR ml/min/1 73sq m 83 85   GLUCOSE RANDOM mg/dL 91 124   CALCIUM mg/dL 9 0 9 6   AST U/L  --  20   ALT U/L  --  28   ALK PHOS U/L  --  88   TOTAL PROTEIN g/dL  --  7 5   BILIRUBIN TOTAL mg/dL  --  0 20       Results from last 7 days  Lab Units 07/13/18  1136 07/13/18  1130 07/13/18  0138 07/13/18  0137   BLOOD CULTURE   --   --  No Growth at 72 hrs  No Growth at 72 hrs     SPUTUM CULTURE  2+ Growth of   --   --   --    GRAM STAIN RESULT  2+ Epithelial Cells  3+ Polys  3+ Gram positive cocci in pairs and chains  1+ Gram positive rods  --   --   --    LEGIONELLA URINARY ANTIGEN   --  Negative  --   --

## 2018-07-16 NOTE — ANESTHESIA PREPROCEDURE EVALUATION
Review of Systems/Medical History  Patient summary reviewed  Chart reviewed  No history of anesthetic complications     Cardiovascular  Exercise tolerance (METS): >4,     Pulmonary  Pneumonia,        GI/Hepatic  Negative GI/hepatic ROS          Negative  ROS        Endo/Other  History of thyroid disease ,      GYN  Negative gynecology ROS          Hematology  Negative hematology ROS      Musculoskeletal  Negative musculoskeletal ROS        Neurology  Negative neurology ROS      Psychology   Depression ,              Physical Exam    Airway    Mallampati score: I  TM Distance: >3 FB  Neck ROM: full     Dental   No notable dental hx     Cardiovascular      Pulmonary      Other Findings        Anesthesia Plan  ASA Score- 2     Anesthesia Type- IV sedation with anesthesia with ASA Monitors  Additional Monitors:   Airway Plan:         Plan Factors- Patient instructed to abstain from smoking on day of procedure  Patient did not smoke on day of surgery  Induction-     Postoperative Plan-     Informed Consent- Anesthetic plan and risks discussed with patient  I personally reviewed this patient with the CRNA  Discussed and agreed on the Anesthesia Plan with the CRNA  Patrick Hutchison

## 2018-07-16 NOTE — DISCHARGE INSTRUCTIONS
Dear Aki Buchanan,     It was our pleasure to care for you here at [facility]   It is our hope that we were always able to meet and exceed the expected standards for your care during your stay  You were hospitalized due to shortness of breath and finding of cavitary lesion of the right lung  You were cared for on the 3rd floor under the service of Jenelle Strange DO with the [facility] who covers for your primary care physician (PCP), Tristian Dunn MD, while you were hospitalized  If you have any questions or concerns related to this hospitalization, you may contact us at [phone]  For follow up, we recommend that you follow up with your primary care physician  Please review this entire discharge summary as additional general instructions may be provided later as well  However, at this time we provide for you here, the most important instructions / recommendations at discharge:    · Make an appointment with your family doctor in 1 week  · Discuss with your family doctor regarding an outpatient ultrasound for your thyroid  · Discuss with your family doctor regarding an outpatient CT scan of your lungs to evaluate the 5 mm nodule found  · Make appointment with lung doctor in 2 weeks to discuss bronchoscopy results and for follow-up of cavitary lesion        Sincerely,     Jenelle Strange DO

## 2018-07-16 NOTE — DISCHARGE SUMMARY
Discharge Summary - [address] [facility]    Patient Information: Tal Celaya 70 y o  female MRN: [id number]  Unit/Bed#: ENDO POOL Encounter: [id number]    Discharging Physician / Practitioner: Imani Landry DO  PCP: Juliano Storm MD  Admission Date: 7/13/2018  Discharge Date: 07/16/18    Disposition:     Home    Reason for Admission: worsening dyspnea, cough    Discharge Diagnoses:     Principal Problem:    Cavitary lesion of lung  Active Problems:    Depression    Thyroid nodule    Pulmonary nodule    Chronic cough    Leukocytosis  Resolved Problems:    * No resolved hospital problems  *  Addendum: 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  Consultations During Hospital Stay:  · Pulmonology - Dr Alina Newell  · Infectious Disease - Dr Shaye Christian    Procedures Performed:     · Bronchoscopy by Dr Alina Newell on 7/16/18  · Erythematous airways  Areas of mucous plugging appreciated  Samples taken for culture  Significant Findings / Test Results:     · CXR - patchy right upper lobe and mid lung zone airspace opacities concerning for infiltrates  Questionable cavitary component right apical opacity  · CTA chest - No evidence of pulmonary embolus  Branching airspace opacity in the right upper lobe with associated cavitary lesion  5 mm nodule in the right upper lobe for which follow up CT scan in 6 months is recommended  Incidental thyroid nodule for which non-emergent thyroid ultrasound is recommended  · EKG - normal  · Blood cultures x2 - negative  · Urine strep pneumoniae antigen - negative  · AFB cultures x3 - negative for growth  · Sputum culture and gram stain - positive for mixed respiratory lilian     · WBC - 12 5 on admission, felt to be secondary to recent prednisone use, to 11 94 at discharge  · CMP - normal upon admission  · Troponin, proBNP, and lactic acid - normal upon admission  · Hemoglobin - stable during admission  · Procalcitonin x2 - negative  · D-dimer - elevated at 842 on admission   · Normal coagulation studies  · TSH, T4, T3 - all WNL    Incidental Findings:   · 5 mm nodule in the right upper lobe in the lung  for which follow up CT scan in 6 months is recommended  · Thyroid nodule for which non-emergent thyroid ultrasound is recommended  Test Results Pending at Discharge (will require follow up):   · AFB culture and stain x2 from bronchoscopy  · Bronchial culture and gram stain x3 from bronchoscopy  · Fungal culture x2 from bronchoscopy  · Urine histoplasmosis antigen  · Quantiferon gold      Outpatient Tests Requested:  · Non-emergent thyroid ultrasound   · CT scan of chest in 6 months    Complications:  None    Hospital Course:     Conner Napier is a 70 y o  female patient who originally presented to the hospital on 7/13/2018 due to worsening dyspnea and cough  She had visited her PCP a day prior and was placed on prednisone and benzonatate with no symptom relief  She reported the cough was progressive over a 2 week period and was mainly non-productive without associated fevers, chills, or night sweats  Initial imaging in the ED was suspicious for an infectious etiology as detailed above, as well as a 5 mm nodule in the right upper lobe  The patient was given albuterol in the ED for dyspnea, and additionally a dose of azithromycin and Rocephin was given for a suspected infectious etiology  The patient subsequently received a dose of vancomycin and cefepime while in the unit, but all antibiotics were discontinued as no evidence of infection was evident from laboratory studies detailed above including a negative procalcitonin, no fevers, and imaging that did not clearly support a pneumonia but rather a cavitary lesion of uncertain etiology  Infectious Disease was consulted for concern of possible tuberculosis and the patient was placed in a negative airflow room with airbourne precautions   All initial testing and cultures for infectious etiology resulted as negative including three negative AFB cultures  Pulmonology was also consulted for the cavitary lesion and a bronchoscopy was completed without complication with cultures taken on 7/16/18  All results from the bronchoscopy are pending at time of discharge  She will follow up with pulmonary for results as various tests will take a while to come back  The patient reported symptom improvement during her uncomplicated hospital stay and reported that her shortness of breath had improved  She was afebrile and her vitals were stable during her stay  She is appropriate to be discharged home today with follow up needed for laboratory results as well as outpatient imaging as detailed above  Condition at Discharge: good     Discharge Day Visit / Exam:     Subjective: The patient reports that she is feeling back to her normal self today and has no episodes of dyspnea  Overnight she reported a coughing fit followed by lower rib pain, which was reproducible to palpation and worse when coughing  An EKG was done at the time and was normal  She reported it as brief and the pain had not returned this morning after sleep  Otherwise she has been sleeping well and reports her pain is controlled  She denies any fevers, chills, syncope, dyspnea, dizziness, abdominal pain, N/V, change in stool, paresthesia, edema, leg pain  Vitals: Blood Pressure: 146/84 (07/16/18 1600)  Pulse: 84 (07/16/18 1600)  Temperature: (!) 97 °F (36 1 °C) (07/16/18 1532)  Temp Source: Temporal (07/16/18 1532)  Respirations: 20 (07/16/18 1600)  Height: 5' 4" (162 6 cm) (07/13/18 0701)  Weight - Scale: 52 2 kg (115 lb 1 3 oz) (07/13/18 0701)  SpO2: 95 % (07/16/18 1600)  Exam:   Physical Exam   Constitutional: She is oriented to person, place, and time  No distress  HENT:   Mouth/Throat: Oropharynx is clear and moist  No oropharyngeal exudate  Cardiovascular: Normal rate, regular rhythm and intact distal pulses      No murmur heard   Pulmonary/Chest: Effort normal and breath sounds normal  She has no wheezes  She has no rales  Abdominal: Soft  Bowel sounds are normal  She exhibits no distension  There is no tenderness  Musculoskeletal: She exhibits no edema or tenderness  Neurological: She is alert and oriented to person, place, and time  Skin: No rash noted  Psychiatric: She has a normal mood and affect  Her behavior is normal  Thought content normal    Vitals reviewed  Discussion with Family: Patient only  Discharge instructions/Information to patient and family:   See after visit summary for information provided to patient and family  Provisions for Follow-Up Care:  See after visit summary for information related to follow-up care and any pertinent home health orders  Planned Readmission: None     Discharge Statement:  I spent 32 minutes discharging the patient  This time was spent on the day of discharge  I had direct contact with the patient on the day of discharge  Greater than 50% of the total time was spent examining patient, answering all patient questions, arranging and discussing plan of care with patient as well as directly providing post-discharge instructions  Additional time then spent on discharge activities  Discharge Medications:  See after visit summary for reconciled discharge medications provided to patient and family        ** Please Note: This note has been constructed using a voice recognition system **

## 2018-07-16 NOTE — PROCEDURES
Bronchoscopy  Date/Time: 7/16/2018 3:32 PM  Performed by: Jaleel Nunn  Authorized by: Jlaeel Nunn     Patient location:  Other (comment) ([facility])  Consent:     Consent obtained:  Verbal    Consent given by:  Patient    Risks discussed: Adverse reaction to sedation, bleeding, infection, death, pain and pneumothorax  Universal protocol:     Procedure explained and questions answered to patient or proxy's satisfaction: yes      Relevant documents present and verified: yes      Test results available and properly labeled: yes      Radiology Images displayed and confirmed  If images not available, report reviewed: yes      Required blood products, implants, devices and special equipment available: yes      Immediately prior to procedure a time out was called: yes      Patient identity confirmed:  Verbally with patient  Indications:     Procedure Purpose: diagnostic and therapeutic      Indications: pneumonia/infiltrate and other (comment)      Indications comment:  Cavitary lung lesion  Sedation:     Sedation type:  Per anesthesia  Upper Airway:     Oropharnyx: normal      Epiglottis: normal      Vocal cords movement: normal      Trachea: normal      Chel:  Normal  Airway:     Airway: The bronchoscope was entered through the mouth and past the vocal cords  There were several areas of mucus noted in the mainstem as well as the right and left bronchus  The airways were erythematous and collapsible  All 5 lobes were examined  There was a considerable amount of bleeding within the airway associated with minor scope trauma  There was mucus plugs noted in the posterior segment of the right upper lobe as well as portions of the right lower lobe  Procedure details:     Description:  BAL performed in RUL/RML  Washings completed in mainstem  Procedures performed:     Lavage site:  RUL/RML  Post-procedure details:     Chest x-ray performed: no      Patient tolerance of procedure:   Tolerated well, no immediate complications    Incomplete procedure: No    Final Diagnosis/Findings:      Erythematous airways  Areas of mucous plugging appreciated

## 2018-07-16 NOTE — ANESTHESIA POSTPROCEDURE EVALUATION
Post-Op Assessment Note      CV Status:  Stable    Mental Status:  Alert and awake    Hydration Status:  Euvolemic    PONV Controlled:  Controlled    Airway Patency:  Patent    Post Op Vitals Reviewed: Yes          Staff: CRNA           BP   137/80   Temp     Pulse  86   Resp   18   SpO2   94%

## 2018-07-16 NOTE — MEDICAL STUDENT
MEDICAL STUDENT  Inpatient Progress Note for TRAINING ONLY  Not Part of Legal Medical Record       Progress Note - Don Allen 70 y o  female MRN: [id number]    Unit/Bed#: -01 Encounter: [id number]      Assessment/Plan: 1  Cough with RUL infiltrate with cavity to rule out malignancy vs  Infection Patient asymptomatic, afebrile, WBC mildly elevated at 11 9  Antibiotics not needed at this time  Diagnostic bronchoscopy to be done today  TB to be ruled out, AFB sputum x3 pending       Mucinex 1200mg PO BID       OOB as tolerated       Continue pulmonary toilet with incentive spirometer   2  Pulmonary nodule       Repeat CT in six months   3  Thyroid nodule          Thyroid function test WNL from 7/14/18  Monitor as per medicine team    4  Depression       Continue Lexapro 20mg PO daily    Subjective: Upon entering room [facility] is up and ambulating states " I am Good, just waiting for this test" Reports inability to sleep due to anxious about bronchoscopy today  Reports cough with moderate white sputum that she spit into toilet  Verbalizes that she knows not to eat or drink until after procedure  Denies Chest pain, SOB, nausea, vomiting or diarrhea  Objective:   Vitals: Blood pressure 109/71, pulse 72, temperature 97 9 °F (36 6 °C), temperature source Oral, resp  rate 18, height 5' 4" (1 626 m), weight 52 2 kg (115 lb 1 3 oz), SpO2 97 %  ,Body mass index is 19 75 kg/m²  Intake/Output Summary (Last 24 hours) at 07/16/18 1150  Last data filed at 07/15/18 1935   Gross per 24 hour   Intake              540 ml   Output                0 ml   Net              540 ml       Physical Exam: General appearance: alert and oriented, in no acute distress  Neck: no adenopathy, no carotid bruit, no JVD and supple, symmetrical, trachea midline  Lungs: clear to auscultation bilaterally and no rales, wheezes, or rhonchi  No use of accessory muscles  No increased effort    Heart: regular rate and rhythm, S1, S2 normal, no murmur, click, rub or gallop  Abdomen: soft, non-tender; bowel sounds normal; no masses,  no organomegaly  Extremities: extremities normal, warm and well-perfused; no cyanosis, clubbing, or edema  Pulses: 2+ and symmetric  Skin: Skin color, texture, turgor normal  No rashes or lesions or Not diaphoretic  Neurologic: Mental status: Alert, oriented, thought content appropriate, Gait steady while ambulating in room     Invasive Devices     Peripheral Intravenous Line            Peripheral IV 07/13/18 Right Forearm 3 days                Lab, Imaging and other studies: WBC 11 94, Three AFB sputum sent, results pending   No new x-ray today  VTE Pharmacologic Prophylaxis: Sequential compression device (Venodyne)   VTE Mechanical Prophylaxis: sequential compression device

## 2018-07-17 ENCOUNTER — TELEPHONE (OUTPATIENT)
Dept: OTHER | Facility: HOSPITAL | Age: 78
End: 2018-07-17

## 2018-07-17 ENCOUNTER — TELEPHONE (OUTPATIENT)
Dept: INFECTIOUS DISEASES | Facility: CLINIC | Age: 78
End: 2018-07-17

## 2018-07-17 ENCOUNTER — HOSPITAL ENCOUNTER (EMERGENCY)
Facility: HOSPITAL | Age: 78
Discharge: HOME | End: 2018-07-17
Attending: EMERGENCY MEDICINE | Admitting: EMERGENCY MEDICINE
Payer: MEDICARE

## 2018-07-17 VITALS
DIASTOLIC BLOOD PRESSURE: 60 MMHG | WEIGHT: 117.06 LBS | RESPIRATION RATE: 17 BRPM | TEMPERATURE: 100.1 F | BODY MASS INDEX: 20.09 KG/M2 | OXYGEN SATURATION: 99 % | SYSTOLIC BLOOD PRESSURE: 112 MMHG | HEART RATE: 85 BPM

## 2018-07-17 DIAGNOSIS — R05.3 CHRONIC COUGH: Primary | ICD-10-CM

## 2018-07-17 DIAGNOSIS — A31.9 MYCOBACTERIUM INFECTION: Primary | ICD-10-CM

## 2018-07-17 DIAGNOSIS — R50.9 FEVER: ICD-10-CM

## 2018-07-17 PROCEDURE — 99283 EMERGENCY DEPT VISIT LOW MDM: CPT

## 2018-07-17 RX ORDER — ACETAMINOPHEN 325 MG/1
975 TABLET ORAL ONCE
Status: COMPLETED | OUTPATIENT
Start: 2018-07-17 | End: 2018-07-17

## 2018-07-17 RX ADMIN — ACETAMINOPHEN 975 MG: 325 TABLET, FILM COATED ORAL at 21:09

## 2018-07-17 NOTE — TELEPHONE ENCOUNTER
Called by lab as patient's bronchoscopy AFB is positive  I called the patient at home to inform her of the results  She is not having any fever or shortness of breath but continues to have a cough  She lives with her  and  neither of which have any immune problems  As is the patient is stable from a respiratory standpoint and has only minimal symptoms with a chronic cough, will attempt to manage this as an outpatient  I discussed with Dr Ammy Gonzalez of pulmonary will arrange follow up with the patient in the office next week  I called the lab and asked them to send a TB PCR on the bronchoscopy fluid  I explained to the patient that is important that she stays at home and will be quarantined until it is confirmed that she does not have tuberculosis, or she has become non contagious  I told the patient that if she develops any worsening respiratory symptoms or fever, she should come to the emergency department for admission  The patient will call me back if any questions

## 2018-07-17 NOTE — TELEPHONE ENCOUNTER
Received a call from Rolando Muniz who is the  for [employer]  She stated that Keren did not understand what Dr Salma Victoria explained to her other than the fact that no one was allowed to leave the house  Bhumika Soria and Keren both agreed that it would be best if Dr Salma Victoria called back and Keren gave Dr Salma Victoria verbal permission to speak with Bhumika Soria  Message relayed to Dr Salma Victoria will call back information

## 2018-07-17 NOTE — TELEPHONE ENCOUNTER
Called the patient's , Everton Castaneda, who has been caring for the patient for the last 40 years  The patient asked for me to talk to Everton Castaneda about the situation  I explained to her the situation as far as quarantine in the patient at home while waiting test results  I also explained that if the patient becomes more ill she will need to come back to the hospital for admission    Everton Castaneda understands the situation, and will call me back with any additional questions

## 2018-07-18 LAB
BACTERIA BLD CULT: NORMAL
BACTERIA BLD CULT: NORMAL
BACTERIA BRONCH AEROBE CULT: NORMAL
GRAM STN SPEC: NORMAL

## 2018-07-18 NOTE — ED PROVIDER NOTES
History  Chief Complaint   Patient presents with    Cough     PT reports "I have possible bad test for TB, was tested and there was something wrong with it " PT c/o cough, a bit of a temp  Patient presents to the emergency department for evaluation of persistent cough and low-grade temperature  She was discharged yesterday with a length the diagnostic workup for possible lung TB  She had bronchoscopy performed and she is scheduled to follow back up with pulmonology  She denies nausea vomiting diarrhea  She denies chest pain sob  She denies weakness or fatigue  She requests to be discharged at this time  Prior to Admission Medications   Prescriptions Last Dose Informant Patient Reported? Taking? Ascorbic Acid (VITAMIN C PO)   Yes Yes   Sig: Take by mouth   Cholecalciferol (VITAMIN D3 PO)   Yes Yes   Sig: Take by mouth   Coenzyme Q10 (COQ-10 PO)   Yes Yes   Sig: Take 200 mg by mouth daily     LUMIGAN 0 01 % ophthalmic drops   Yes Yes   Si DROP INTO BOTH EYES AT BEDTIME   TURMERIC PO   Yes Yes   Sig: Take by mouth   acetaminophen (TYLENOL) 325 mg tablet   No Yes   Sig: Take 2 tablets (650 mg total) by mouth every 6 (six) hours as needed for mild pain, headaches or fever   escitalopram (LEXAPRO) 20 mg tablet   Yes Yes   Sig: Take 20 mg by mouth daily   guaiFENesin 1200 MG TB12   No Yes   Sig: Take 1 tablet (1,200 mg total) by mouth 2 (two) times a day for 7 days   timolol (TIMOPTIC-XE) 0 25 % ophthalmic gel-forming   Yes Yes   Sig: INSTILL 1 DROP INTO LEFT EYE EVERY MORNING      Facility-Administered Medications: None       History reviewed  No pertinent past medical history  History reviewed  No pertinent surgical history  Family History   Problem Relation Age of Onset    Dementia Mother     Hypertension Father      I have reviewed and agree with the history as documented      Social History   Substance Use Topics    Smoking status: Never Smoker    Smokeless tobacco: Never Used  Alcohol use No        Review of Systems   Constitutional: Positive for fever  Negative for activity change, appetite change, chills, diaphoresis and fatigue  HENT: Negative  Negative for congestion, drooling, ear pain, rhinorrhea, sinus pain, sinus pressure, sore throat, trouble swallowing and voice change  Eyes: Negative  Negative for photophobia and visual disturbance  Respiratory: Positive for cough  Negative for chest tightness, shortness of breath, wheezing and stridor  Cardiovascular: Negative  Negative for chest pain, palpitations and leg swelling  Gastrointestinal: Negative  Negative for abdominal pain, blood in stool, constipation, diarrhea, nausea and vomiting  Endocrine: Negative  Genitourinary: Negative  Negative for decreased urine volume, difficulty urinating, dysuria, flank pain, frequency, genital sores, hematuria, pelvic pain, vaginal bleeding, vaginal discharge and vaginal pain  Musculoskeletal: Negative  Negative for back pain, neck pain and neck stiffness  Skin: Negative  Negative for rash and wound  Allergic/Immunologic: Negative  Neurological: Negative  Negative for dizziness, tremors, seizures, syncope, facial asymmetry, speech difficulty, weakness, light-headedness, numbness and headaches  Hematological: Negative  Does not bruise/bleed easily  Psychiatric/Behavioral: Negative  Physical Exam  Physical Exam   Constitutional: She is oriented to person, place, and time  She appears well-developed and well-nourished  Nontoxic appearance  No respiratory distress  Patient does not appear to be in pain  She can answer simple questions and follow simple commands and engage in normal conversation  HENT:   Head: Normocephalic and atraumatic  Right Ear: External ear normal    Left Ear: External ear normal    Mouth/Throat: Oropharynx is clear and moist    Eyes: Conjunctivae and EOM are normal  Pupils are equal, round, and reactive to light     Neck: Normal range of motion  Neck supple  Cardiovascular: Normal rate, regular rhythm, normal heart sounds and intact distal pulses  Pulmonary/Chest: Effort normal and breath sounds normal  No respiratory distress  She has no wheezes  She has no rales  She exhibits no tenderness  Abdominal: Soft  Bowel sounds are normal  She exhibits no distension and no mass  There is no tenderness  There is no rebound and no guarding  No hernia  Musculoskeletal: Normal range of motion  She exhibits no edema, tenderness or deformity  Neurological: She is alert and oriented to person, place, and time  She has normal reflexes  She displays normal reflexes  No cranial nerve deficit or sensory deficit  She exhibits normal muscle tone  Coordination normal    Skin: Skin is warm and dry  No rash noted  No erythema  No pallor  Psychiatric: She has a normal mood and affect  Her behavior is normal  Judgment and thought content normal    Nursing note and vitals reviewed  Vital Signs  ED Triage Vitals [07/17/18 1850]   Temperature Pulse Respirations Blood Pressure SpO2   100 1 °F (37 8 °C) 99 20 117/69 96 %      Temp Source Heart Rate Source Patient Position - Orthostatic VS BP Location FiO2 (%)   Oral Monitor Lying Right arm --      Pain Score       --           Vitals:    07/17/18 1850 07/17/18 1930   BP: 117/69 125/68   Pulse: 99 96   Patient Position - Orthostatic VS: Lying Lying       Visual Acuity      ED Medications  Medications   acetaminophen (TYLENOL) tablet 975 mg (not administered)       Diagnostic Studies  Results Reviewed     None                 No orders to display              Procedures  Procedures       Phone Contacts  ED Phone Contact    ED Course  ED Course as of Jul 17 2105   Tue Jul 17, 2018 2059 Patient is stable for discharge  Tylenol was given for the low-grade temp  Patient is scheduled to follow up with Pulmonary Medicine for results from bronchoscopy    Discussed signs and symptoms that would require patient to return to the emergency department  MDM  CritCare Time    Disposition  Final diagnoses:   Chronic cough   Fever     Time reflects when diagnosis was documented in both MDM as applicable and the Disposition within this note     Time User Action Codes Description Comment    7/17/2018  9:01 PM Paola Bui Add [R05] Chronic cough     7/17/2018  9:01 PM Sally Horvathissa Jose Guadalupe Add [R50 9] Fever       ED Disposition     ED Disposition Condition Comment    Discharge  [address] discharge to home/self care  Condition at discharge: Stable        Follow-up Information     Follow up With Special[facility] Details [facility]  Schedule an appointment as soon as possible for a visit See pulmonary medicine as scheduled           Patient's Medications   Discharge Prescriptions    No medications on file     No discharge procedures on file      ED Provider  Electronically Signed by           Tianna Davison MD  07/17/18 0778

## 2018-07-18 NOTE — DISCHARGE INSTRUCTIONS
Chronic Cough   WHAT YOU NEED TO KNOW:   What is a chronic cough? A chronic cough is a cough that lasts more than 4 weeks in children or 8 weeks in adults  What causes a chronic cough? · Smoking or exposure to secondhand smoke    · Allergies    · Acid reflux    · Lung conditions such as asthma, COPD, lung cancer, or pneumonia    · Medicines such as blood pressure or heart medicines    · Conditions such as cystic fibrosis    · Lung infections such as pertussis or tuberculosis  What other signs and symptoms might I have? · Wheezing and shortness of breath    · A runny or stuffy nose    · Pain or itching in your throat    · Red, swollen, watery eyes    · A raspy or hoarse voice    · Heartburn or a sour taste in your mouth  How is a chronic cough diagnosed? Your healthcare provider will examine you and ask about your symptoms  Tell him about your medical conditions, medicines, and any recent respiratory infections  Tell him if you have ever smoked, currently smoke, or are exposed to secondhand smoke  You may need a chest x-ray to check for problems with your lungs  You may need other tests to find the cause of your chronic cough  This may include blood tests, lung function tests, or an endoscopy  Ask your healthcare provider for more information on these tests  How is a chronic cough treated? The cough may go away on its own without treatment  You may need medicine to stop the cough or treat the cause of your cough  This may include medicine to treat allergies or acid reflux, or decrease swelling in your airways  You may also need antibiotics to treat a respiratory infection  If you take medicine that causes a chronic cough, it may be stopped or changed  You may need speech therapy  A speech therapist can teach you ways to control your cough  What can I do to care for myself? · Prevent acid reflex  Acid reflux can make your chronic cough worse  Raise your head and upper back when you sleep   Place 2 or more pillows behind your head or sleep in a recliner  Do not lie down for at least 1 hour after you eat  Do not have foods or drinks that increase heartburn  Ask your healthcare provider for other ways to prevent acid reflux  · Do not smoke  Encourage your adolescent child not to smoke  Nicotine and other chemicals in cigarettes and cigars can cause lung damage  They can also make your cough worse  Ask your healthcare provider for information if you currently smoke and need help to quit  E-cigarettes or smokeless tobacco still contain nicotine  Talk to your healthcare provider before you use these products  · Stay away from secondhand smoke  Do not let people smoke in your car, home, or near your child  Do not stand near someone that is smoking  This includes anyone that is smoking an E-cigarrete  · Avoid anything that triggers your allergies or irritates your throat  Allergens and irritants can make your chronic cough worse  Allergens may include dust mites, pollen, pet dander, or mold  Wear a mask if you work around pollutants or irritants  Ask your healthcare provider for more ways to decrease your exposure to allergens or irritants  · Drink plenty of liquids as directed  Liquids may help relieve throat discomfort that causes you to cough  Add honey to tea or hot water to help ease your throat pain  Ask how much liquid to drink each day and which liquids are best for you  Call 911 for any of the following:   · You cough up blood  · You faint when you cough  · You have trouble breathing  When should I contact my healthcare provider? · You have new or worsening symptoms  · You have severe pain when you take a deep breath  · You become very tired after a coughing fit  · You have trouble sleeping because of the coughing  · You have questions or concerns about your condition or care  CARE AGREEMENT:   You have the right to help plan your care   Learn about your health condition and how it may be treated  Discuss treatment options with your caregivers to decide what care you want to receive  You always have the right to refuse treatment  The above information is an  only  It is not intended as medical advice for individual conditions or treatments  Talk to your doctor, nurse or pharmacist before following any medical regimen to see if it is safe and effective for you  © 2017 [address] Information is for End User's use only and may not be sold, redistributed or otherwise used for commercial purposes  All illustrations and images included in CareNotes® are the copyrighted property of [employer]  or Michele Azul  Fever in Adults, Ambulatory Care   GENERAL INFORMATION:   A fever  is an increase in body temperature above 100 4°F (38°C)  Fever may be caused by infection, inflammatory disorders, or the cause may not be known  Other signs and symptoms may include any of the following:   · Chills and shivers     · Muscle stiffness    · Weight loss    · Night sweats    · Fever that comes and goes    · Fever that is higher in the morning  Seek immediate care for the following symptoms:   · Shortness of breath or chest pain    · Blue skin, lips, or nails    · Stiff neck and a bad headache    · Fever does not go away or gets worse even after treatment    · Confusion    · Urinate only very small amounts or not at all    · Heart beats faster than normal even after treatment  Treatment for a fever  may include medicine to decrease your fever  You may also need medicine to treat an infection caused by bacteria  Ask your healthcare provider for more information about the medicines you are given and how to use them safely  Manage your fever:   · Take a bath  in cool or lukewarm water  · Use an ice pack  wrapped in a small towel or wet a washcloth with cool water  Place the ice pack or wet washcloth on your forehead or the back of your neck       · Drink liquids as directed  Ask how much liquid to drink each day and which liquids are best for you  Liquids will help prevent dehydration  Follow up with your healthcare provider as directed:  Write down your questions so you remember to ask them during your visits  CARE AGREEMENT:   You have the right to help plan your care  Learn about your health condition and how it may be treated  Discuss treatment options with your caregivers to decide what care you want to receive  You always have the right to refuse treatment  The above information is an  only  It is not intended as medical advice for individual conditions or treatments  Talk to your doctor, nurse or pharmacist before following any medical regimen to see if it is safe and effective for you  © 2014 [address] is for End User's use only and may not be sold, redistributed or otherwise used for commercial purposes  All illustrations and images included in CareNotes® are the copyrighted property of [employer]  or Michele Liang

## 2018-07-18 NOTE — ED NOTES
Discharge instructions discussed with patient prior to d/c  Encouraged to f/u with pcp and pulmonology  No questions prior to discharge  Friend picking up from ED waiting room        Daniele Espinosa RN  07/17/18 7316

## 2018-07-19 LAB
ANNOTATION COMMENT IMP: NORMAL
GAMMA INTERFERON BACKGROUND BLD IA-ACNC: 0.05 IU/ML
M TB IFN-G BLD-IMP: NEGATIVE
M TB IFN-G CD4+ BCKGRND COR BLD-ACNC: 0.01 IU/ML
M TB IFN-G CD4+ T-CELLS BLD-ACNC: 0.06 IU/ML
MITOGEN IGNF BLD-ACNC: 6.68 IU/ML
QUANTIFERON-TB GOLD IN TUBE: NORMAL
SERVICE CMNT-IMP: NORMAL

## 2018-07-24 ENCOUNTER — OFFICE VISIT (OUTPATIENT)
Dept: PULMONOLOGY | Facility: CLINIC | Age: 78
End: 2018-07-24
Payer: MEDICARE

## 2018-07-24 VITALS
SYSTOLIC BLOOD PRESSURE: 90 MMHG | TEMPERATURE: 99.1 F | BODY MASS INDEX: 19.88 KG/M2 | WEIGHT: 112.2 LBS | DIASTOLIC BLOOD PRESSURE: 70 MMHG | HEART RATE: 89 BPM | HEIGHT: 63 IN | OXYGEN SATURATION: 98 %

## 2018-07-24 DIAGNOSIS — A31.0 MYCOBACTERIUM AVIUM COMPLEX (HCC): Primary | ICD-10-CM

## 2018-07-24 LAB
LABORATORY COMMENT REPORT: NORMAL
M AVIUM CMPLX RRNA SPEC QL PROBE: POSITIVE
M GORDONAE RRNA SPEC QL PROBE: ABNORMAL
M KANSASII RRNA SPEC QL PROBE: ABNORMAL
M TB CMPLX RRNA SPEC QL PROBE: NEGATIVE
OTHER: ABNORMAL

## 2018-07-24 PROCEDURE — 99214 OFFICE O/P EST MOD 30 MIN: CPT | Performed by: INTERNAL MEDICINE

## 2018-07-24 RX ORDER — MAGNESIUM 30 MG
30 TABLET ORAL 2 TIMES DAILY
COMMUNITY

## 2018-07-24 RX ORDER — ZINC GLUCONATE 50 MG
50 TABLET ORAL DAILY
COMMUNITY

## 2018-07-24 NOTE — PATIENT INSTRUCTIONS
Please follow up in 3 months  If you notice your cough is getting worse or you continue to lose weight, give me a call

## 2018-07-25 LAB
FUNGUS SPEC CULT: ABNORMAL
MYCOBACTERIUM SPEC CULT: ABNORMAL
MYCOBACTERIUM SPEC CULT: ABNORMAL
RHODAMINE-AURAMINE STN SPEC: ABNORMAL

## 2018-07-25 NOTE — PROGRESS NOTES
Progress Note - Pulmonary   Conner Reveal 70 y o  female MRN: [id number]   Encounter: [id number]      Assessment/Plan:  Patient is a 70-year-old female who was recently admitted to [facility] for cavitary lesion  On bronchoscopy she was found to have AFB which was negative for QuantiFERON gold as well as PCR  The culture returned with mycobacterium avium  Patient is relatively asymptomatic from a MAC perspective  I have discussed her care with Infectious Disease and feel that no treatment is indicated at this time as the patient has no symptoms  I told her she may discontinue her previous Advair as well as cough medications  If she feels her cough is getting worse I have instructed to call my office  I have also instructed her to have follow-up CT scan in approximately 3 months to evaluate the progression of the cavitary lesion on the recent CT scan  I personally spent 25 minutes of a total visit of 30 minutes counseling the patient on the diagnosis, prognosis and management of mycobacterium avium complex  I have answered all the questions of the patient and her friend  The patient may follow up in 3 months or sooner as necessary  Plan:  Orders Placed This Encounter   Procedures    CT chest without contrast     Standing Status:   Future     Standing Expiration Date:   7/24/2022     Scheduling Instructions: There is no prep for this study  Please bring your insurance cards, a form of photo ID and a list of your medications with you  Arrive 15 minutes prior to your appointment time to register  On the day of your test, please bring any prior CT or MRI studies of this area with you that were not performed at a [facility]  To schedule this appointment, please contact Central Scheduling at [phone]  Order Specific Question:   What is the patient's sedation requirement?      Answer:   No Sedation       Subjective:   Patient reports feeling quite well since discharge  She has no new complaints  She denies fevers, chills, congestion or shortness of breath  She denies night sweats  She reports some mild weight loss but this is attributed to eating less  Inhaler Regimen:  Advair  - not taking    Remainder of 12 point review of systems negative except as described in HPI  The following portions of the patient's history were reviewed and updated as appropriate: allergies, current medications, past family history, past medical history, past social history, past surgical history and problem list      Objective:   Vitals: Blood pressure 90/70, pulse 89, temperature 99 1 °F (37 3 °C), temperature source Tympanic, height 5' 3" (1 6 m), weight 50 9 kg (112 lb 3 2 oz), SpO2 98 % , RA, Body mass index is 19 88 kg/m²  Physical Exam  Gen: Awake, alert, oriented x 3, no acute distress  HEENT: Mucous membranes moist, no oral lesions, no thrush  NECK: No accessory muscle use, JVP not elevated  Cardiac: Regular, single S1, single S2, no murmurs, no rubs, no gallops  Lungs: CTA B/L  Abdomen: normoactive bowel sounds, soft nontender, nondistended, no rebound or rigidity, no guarding  Extremities: no cyanosis, no clubbing, no edema  MSK:  Strength equal in all extremities  Derm:  No rashes/lesions noted  Neuro:  Appropriate mood/affect    Labs: I have personally reviewed pertinent lab results  Lab Results   Component Value Date    WBC 11 94 (H) 07/15/2018    HGB 12 0 07/15/2018    HCT 37 2 07/15/2018    MCV 98 07/15/2018     07/15/2018       Imaging and other studies: I have personally reviewed pertinent films in PACS  CT Chest  7/13/2018: There is a cavitary lesion in the periphery of the right upper lobe measuring 2 1 x 1 9 cm  Branching airspace opacity in the right upper lobe is seen  Scarring with traction bronchiectasis in the right middle lobe is noted  Emphysematous changes in the lungs is visualized    There is a 5 mm nodule in the right upper lobe  Nodular airspace opacities in the left lower lobe are seen  Pulmonary Function Testing:   No pulmonary function testing available for review  Reji Dia, [address]

## 2018-07-26 LAB — SCAN RESULT: NORMAL

## 2018-08-03 LAB
MYCOBACTERIUM SPEC CULT: NORMAL
RHODAMINE-AURAMINE STN SPEC: NORMAL

## 2018-08-20 LAB — FUNGUS SPEC CULT: NORMAL

## 2018-08-21 LAB
MYCOBACTERIUM SPEC CULT: NORMAL
RHODAMINE-AURAMINE STN SPEC: NORMAL

## 2018-09-17 ENCOUNTER — OFFICE VISIT (OUTPATIENT)
Dept: OBGYN CLINIC | Facility: CLINIC | Age: 78
End: 2018-09-17
Payer: MEDICARE

## 2018-09-17 VITALS
WEIGHT: 110 LBS | HEART RATE: 82 BPM | BODY MASS INDEX: 19.49 KG/M2 | HEIGHT: 63 IN | SYSTOLIC BLOOD PRESSURE: 128 MMHG | DIASTOLIC BLOOD PRESSURE: 82 MMHG

## 2018-09-17 DIAGNOSIS — M18.0 PRIMARY OSTEOARTHRITIS OF BOTH FIRST CARPOMETACARPAL JOINTS: Primary | ICD-10-CM

## 2018-09-17 PROCEDURE — 20600 DRAIN/INJ JOINT/BURSA W/O US: CPT | Performed by: ORTHOPAEDIC SURGERY

## 2018-09-17 PROCEDURE — 99213 OFFICE O/P EST LOW 20 MIN: CPT | Performed by: ORTHOPAEDIC SURGERY

## 2018-09-17 RX ORDER — LIDOCAINE HYDROCHLORIDE 10 MG/ML
1 INJECTION, SOLUTION INFILTRATION; PERINEURAL
Status: COMPLETED | OUTPATIENT
Start: 2018-09-17 | End: 2018-09-17

## 2018-09-17 RX ORDER — BETAMETHASONE SODIUM PHOSPHATE AND BETAMETHASONE ACETATE 3; 3 MG/ML; MG/ML
3 INJECTION, SUSPENSION INTRA-ARTICULAR; INTRALESIONAL; INTRAMUSCULAR; SOFT TISSUE
Status: COMPLETED | OUTPATIENT
Start: 2018-09-17 | End: 2018-09-17

## 2018-09-17 RX ADMIN — LIDOCAINE HYDROCHLORIDE 1 ML: 10 INJECTION, SOLUTION INFILTRATION; PERINEURAL at 14:44

## 2018-09-17 RX ADMIN — BETAMETHASONE SODIUM PHOSPHATE AND BETAMETHASONE ACETATE 3 MG: 3; 3 INJECTION, SUSPENSION INTRA-ARTICULAR; INTRALESIONAL; INTRAMUSCULAR; SOFT TISSUE at 14:45

## 2018-09-17 RX ADMIN — BETAMETHASONE SODIUM PHOSPHATE AND BETAMETHASONE ACETATE 3 MG: 3; 3 INJECTION, SUSPENSION INTRA-ARTICULAR; INTRALESIONAL; INTRAMUSCULAR; SOFT TISSUE at 14:44

## 2018-09-17 RX ADMIN — LIDOCAINE HYDROCHLORIDE 1 ML: 10 INJECTION, SOLUTION INFILTRATION; PERINEURAL at 14:45

## 2018-09-17 NOTE — PROGRESS NOTES
ASSESSMENT/PLAN:    Assessment:   Thumb CMC Arthritis  bilateral injections were provided today    Plan:   Resume activities as tolerated and ice    Follow Up:  5  month(s)    General Discussions:     CMC Arthritis: The anatomy and physiology of carpometacarpal joint arthritis was discussed with the patient today in the office  Deterioration of the articular cartilage eventually leads to hypermobility at the thumb [facility] joint, resulting in joint subluxation, osteophyte formation, cystic changes within the trapezium and base of the first metacarpal, as well as subchondral sclerosis  Eventually, pain, limited mobility, and compensatory hyperextension at the metacarpophalangeal joint may develop  While normal activity and usage of the thumb joint may provide a painful experience to the patient, this typically does not result in damage to the thumb or hand  Treatment options include resting thumb spica splints to decreased joint edema, pain, and inflammation  Therapy exercises to strengthen the thenar musculature may relieve pain, but do not alter the overall continued development of osteoarthritis  Oral medications, topical medications, corticosteroid injections may decrease pain and increase overall function  Eventually, approximately 5% of patients may require surgical intervention  _____________________________________________________  CHIEF COMPLAINT:  Chief Complaint   Patient presents with    Left Thumb - Follow-up    Right Thumb - Follow-up         SUBJECTIVE:  Jamin Ramírez is a 70y o  year old female who presents for follow up regarding Thumb CMC Arthritis  bilateral   Since last visit, Jamin Ramírez has tried Resume activities as tolerated and ice with relief  She notes that she had about 5 months of relief from her last cortisone injections on 4/29/18  Today there is Pain  Mild  Intermittant  Aching to the bilateral CMC joints, ongoing for about 1 5 weeks     Radiation: None  Associated symptoms: Stiffness/LROM    PAST MEDICAL HISTORY:  History reviewed  No pertinent past medical history  PAST SURGICAL HISTORY:  Past Surgical History:   Procedure Laterality Date    WA BRONCHOSCOPY,DIAGNOSTIC Right 7/16/2018    Procedure: Yesi Britton;  Surgeon: Avis Aldridge MD;  Location: [facility]; Service: Pulmonary       FAMILY HISTORY:  Family History   Problem Relation Age of Onset    Dementia Mother     Hypertension Father        SOCIAL HISTORY:  Social History   Substance Use Topics    Smoking status: Never Smoker    Smokeless tobacco: Never Used    Alcohol use No       MEDICATIONS:    Current Outpatient Prescriptions:     acetaminophen (TYLENOL) 325 mg tablet, Take 2 tablets (650 mg total) by mouth every 6 (six) hours as needed for mild pain, headaches or fever, Disp: 30 tablet, Rfl: 0    Ascorbic Acid (VITAMIN C PO), Take by mouth, Disp: , Rfl:     Cholecalciferol (VITAMIN D3 PO), Take by mouth, Disp: , Rfl:     Coenzyme Q10 (COQ-10 PO), Take 200 mg by mouth daily  , Disp: , Rfl:     escitalopram (LEXAPRO) 20 mg tablet, Take 20 mg by mouth daily, Disp: , Rfl: 3    LUMIGAN 0 01 % ophthalmic drops, 1 DROP INTO BOTH EYES AT BEDTIME, Disp: , Rfl: 4    magnesium 30 MG tablet, Take 30 mg by mouth 2 (two) times a day, Disp: , Rfl:     timolol (TIMOPTIC-XE) 0 25 % ophthalmic gel-forming, INSTILL 1 DROP INTO LEFT EYE EVERY MORNING, Disp: , Rfl: 4    TURMERIC PO, Take by mouth, Disp: , Rfl:     zinc gluconate 50 mg tablet, Take 50 mg by mouth daily, Disp: , Rfl:     ALLERGIES:  No Known Allergies    REVIEW OF SYSTEMS:  Pertinent items are noted in HPI  A comprehensive review of systems was negative      LABS:  HgA1c: No results found for: HGBA1C  BMP:   Lab Results   Component Value Date    CALCIUM 9 0 07/13/2018     07/13/2018    K 3 7 07/13/2018    CO2 29 07/13/2018     07/13/2018    BUN 12 07/13/2018    CREATININE 0 71 07/13/2018 _____________________________________________________  PHYSICAL EXAMINATION:  General: well developed and well nourished, alert, oriented times 3 and appears comfortable  Psychiatric: Normal  HEENT: Trachea Midline, No torticollis  Cardiovascular: No discernable arrhythmia  Pulmonary: No wheezing or stridor  Skin: No masses, erthema, lacerations, fluctation, ulcerations  Neurovascular: Sensation Intact to the Median, Ulnar, Radial Nerve, Motor Intact to the Median, Ulnar, Radial Nerve and Pulses Intact    MUSCULOSKELETAL EXAMINATION:  LEFT SIDE:  CMC: Positive grind, Positive tendnerness CMC and Positive Shoulder Sign  RIGHT SIDE:  CMC: Positive grind, Positive tendnerness CMC and Positive Shoulder Sign    _____________________________________________________  STUDIES REVIEWED:  No Studies to review      PROCEDURES PERFORMED:  Small joint arthrocentesis  Date/Time: 9/17/2018 2:44 PM  Consent given by: patient  Site marked: site marked  Timeout: Immediately prior to procedure a time out was called to verify the correct patient, procedure, equipment, support staff and site/side marked as required   Supporting Documentation  Indications: pain and diagnostic evaluation   Procedure Details  Location: thumb - R thumb CMC  Preparation: Patient was prepped and draped in the usual sterile fashion  Needle size: 25 G  Ultrasound guidance: no  Medications administered: 3 mg betamethasone acetate-betamethasone sodium phosphate 6 (3-3) mg/mL; 1 mL lidocaine 1 %    Patient tolerance: patient tolerated the procedure well with no immediate complications  Dressing:  Sterile dressing applied  Small joint arthrocentesis  Date/Time: 9/17/2018 2:45 PM  Consent given by: patient  Site marked: site marked  Timeout: Immediately prior to procedure a time out was called to verify the correct patient, procedure, equipment, support staff and site/side marked as required   Supporting Documentation  Indications: pain and diagnostic evaluation   Procedure Details  Location: thumb - L thumb CMC  Needle size: 25 G  Ultrasound guidance: no  Medications administered: 1 mL lidocaine 1 %; 3 mg betamethasone acetate-betamethasone sodium phosphate 6 (3-3) mg/mL    Patient tolerance: patient tolerated the procedure well with no immediate complications  Dressing:  Sterile dressing applied      Scribe Attestation    I,:   Gabriela Manzano am acting as a scribe while in the presence of the attending physician :        I,:   Jessy Cardona MD personally performed the services described in this documentation    as scribed in my presence :

## 2018-10-01 LAB
MYCOBACTERIUM SPEC CULT: NORMAL
MYCOBACTERIUM SPEC CULT: NORMAL
RHODAMINE-AURAMINE STN SPEC: NORMAL
RHODAMINE-AURAMINE STN SPEC: NORMAL

## 2018-10-08 ENCOUNTER — HOSPITAL ENCOUNTER (OUTPATIENT)
Dept: CT IMAGING | Facility: HOSPITAL | Age: 78
Discharge: HOME | End: 2018-10-08
Attending: INTERNAL MEDICINE
Payer: MEDICARE

## 2018-10-08 DIAGNOSIS — A31.0 MYCOBACTERIUM AVIUM COMPLEX (HCC): ICD-10-CM

## 2018-10-08 PROCEDURE — 71250 CT THORAX DX C-: CPT

## 2018-10-12 ENCOUNTER — OFFICE VISIT (OUTPATIENT)
Dept: PULMONOLOGY | Facility: CLINIC | Age: 78
End: 2018-10-12
Payer: MEDICARE

## 2018-10-12 VITALS
WEIGHT: 112 LBS | DIASTOLIC BLOOD PRESSURE: 60 MMHG | SYSTOLIC BLOOD PRESSURE: 106 MMHG | BODY MASS INDEX: 19.12 KG/M2 | TEMPERATURE: 98.8 F | HEIGHT: 64 IN | OXYGEN SATURATION: 96 % | HEART RATE: 82 BPM | RESPIRATION RATE: 18 BRPM

## 2018-10-12 DIAGNOSIS — A31.0 MYCOBACTERIUM AVIUM COMPLEX (HCC): Primary | ICD-10-CM

## 2018-10-12 DIAGNOSIS — J98.4 CAVITARY LESION OF LUNG: ICD-10-CM

## 2018-10-12 PROCEDURE — 99214 OFFICE O/P EST MOD 30 MIN: CPT | Performed by: INTERNAL MEDICINE

## 2018-10-12 RX ORDER — ETHAMBUTOL HYDROCHLORIDE 400 MG/1
15 TABLET, FILM COATED ORAL DAILY
Qty: 180 TABLET | Refills: 2 | Status: SHIPPED | OUTPATIENT
Start: 2018-10-12 | End: 2020-03-26

## 2018-10-12 RX ORDER — BUSPIRONE HYDROCHLORIDE 5 MG/1
5 TABLET ORAL
COMMUNITY
Start: 2018-09-18 | End: 2019-07-11 | Stop reason: SDUPTHER

## 2018-10-12 RX ORDER — BENZONATATE 200 MG/1
200 CAPSULE ORAL 3 TIMES DAILY
COMMUNITY
Start: 2018-10-03 | End: 2020-05-22 | Stop reason: SDUPTHER

## 2018-10-12 RX ORDER — AZITHROMYCIN 250 MG/1
250 TABLET, FILM COATED ORAL EVERY 24 HOURS
Qty: 90 TABLET | Refills: 3 | Status: SHIPPED | OUTPATIENT
Start: 2018-10-12 | End: 2019-01-10

## 2018-10-12 NOTE — PATIENT INSTRUCTIONS
Non tuberculosis mycobacterium  - mycobacterium avium complex - cavitary    Ethambutol  Rifampin  Azithromycin    Planning on adding Amikacin for about 2-4 months  Through PICC line

## 2018-10-12 NOTE — PROGRESS NOTES
Progress Note - Pulmonary   Lizeth Ast 70 y o  female MRN: [id number]   Encounter: [id number]      Assessment/Plan:  Patient is a 29-year-old female presenting for follow-up  The patient underwent a repeat CT scan to monitor the progression of her previously diagnosed MAC  Unfortunately, there has been significant progression of her disease and is now cavitary  Given this progression, the patient will need to start on therapy for the MAC  I have discussed the risks and benefits with the patient and she understands the necessity of starting treatment at this time  She will be treated with azithromycin, rifampin and ethambutol  The patient will need follow-up in 1 month to monitor tolerance of treatment and then require a bronchoscopy in approximately 3 months to check for clearance  She may follow up in the office in 1 month  I personally spent 30 min of a total visit lasting 40 min counseling the patient on the diagnosis of MAC including treatment, prognosis and progression  Plan:  -  Start treatment for MAC   -  Azithromycin   -  Rifampin   -  Ethambutol  -  Will monitor hepatic function    Subjective: The patient reports she has been doing relatively well overall since her discharge in hospital   She does have periods of significant night sweats  The patient does notice a decreased exercise tolerance specifically when using the treadmill  She does have a cough which is productive as well  She denies fevers, chills, nausea or vomiting  Inhaler Regimen:  None    Remainder of 12 point review of systems negative except as described in HPI        The following portions of the patient's history were reviewed and updated as appropriate: allergies, current medications, past family history, past medical history, past social history, past surgical history and problem list      Objective:   Vitals: Blood pressure 106/60, pulse 82, temperature 98 8 °F (37 1 °C), temperature source Tympanic, resp  rate 18, height 5' 4" (1 626 m), weight 50 8 kg (112 lb), SpO2 96 % , RA, Body mass index is 19 22 kg/m²  Physical Exam  Gen: Awake, alert, oriented x 3, no acute distress  HEENT: Mucous membranes moist, no oral lesions, no thrush  Derm:  No rashes/lesions noted  Neuro:  Appropriate mood/affect    Labs: I have personally reviewed pertinent lab results  Lab Results   Component Value Date    WBC 11 94 (H) 07/15/2018    HGB 12 0 07/15/2018    HGB 15 0 05/24/2014     07/15/2018     Lab Results   Component Value Date    CREATININE 0 71 07/13/2018     Imaging and other studies: I have personally reviewed pertinent reports  and I have personally reviewed pertinent films in PACS  CT Chest 10/8/2018:  LUNGS:  There has been marked increase in the cavitary consolidation in the posterior right upper lobe, measuring approximately 7 1 x 5 9 cm (previously 2 1 x 1 9 cm)  There are multiple new clustered reticulonodular densities scattered throughout both lungs  Unchanged consolidation with bronchiectasis in the right middle lobe  There is no tracheal or endobronchial lesion  Pulmonary Function Testing:   No pulmonary function testing available for review  [facility]

## 2018-11-14 ENCOUNTER — DOCUMENTATION (OUTPATIENT)
Dept: PULMONOLOGY | Facility: CLINIC | Age: 78
End: 2018-11-14

## 2018-11-14 ENCOUNTER — OFFICE VISIT (OUTPATIENT)
Dept: PULMONOLOGY | Facility: CLINIC | Age: 78
End: 2018-11-14
Payer: MEDICARE

## 2018-11-14 VITALS
WEIGHT: 110 LBS | SYSTOLIC BLOOD PRESSURE: 104 MMHG | TEMPERATURE: 98.6 F | HEART RATE: 85 BPM | DIASTOLIC BLOOD PRESSURE: 56 MMHG | OXYGEN SATURATION: 98 % | HEIGHT: 63 IN | BODY MASS INDEX: 19.49 KG/M2

## 2018-11-14 DIAGNOSIS — J98.4 CAVITARY LESION OF LUNG: ICD-10-CM

## 2018-11-14 DIAGNOSIS — A31.0 MYCOBACTERIUM AVIUM COMPLEX (HCC): Primary | ICD-10-CM

## 2018-11-14 PROCEDURE — 99214 OFFICE O/P EST MOD 30 MIN: CPT | Performed by: INTERNAL MEDICINE

## 2018-11-14 NOTE — PROGRESS NOTES
I called [facility] to schedule patient for [facility] on 12/18/18 in am  She will call me back with a Definite date and time  Patient is scheduled for December 17th @ 11am At [facility]  Patient is aware and paperwork was mailed to her

## 2018-11-14 NOTE — PROGRESS NOTES
Progress Note - Pulmonary   Moises Sofia 70 y o  female MRN: [id number]   Encounter: [id number]      Assessment/Plan:  Patient is 51-year-old female presenting for follow-up of recent diagnosis of MAC  The patient has been tolerating her triple therapy of ethambutol, rifampin and azithromycin  She reports significant improvement in her night sweats and has had no other abnormalities related to her therapy  I have encouraged the patient to get her blood work to monitor both liver function and CBC in setting of the therapy  She understands and will have this done shortly  We will also plan for a repeat bronchoscopy to confirm clearance of the MAC which we done in December  This will help set the total duration of therapy  Will follow up patient at the bronchoscopy  Plan:  Orders Placed This Encounter   Procedures    Comprehensive metabolic panel     This is a patient instruction: Patient fasting for 8 hours or longer recommended  Standing Status:   Standing     Number of Occurrences:   12     Standing Expiration Date:   11/14/2019    CBC and differential     This is a patient instruction: This test is non-fasting  Please drink two glasses of water morning of bloodwork  Standing Status:   Standing     Number of Occurrences:   12     Standing Expiration Date:   11/14/2019       Subjective:   Pt reports she is tolerating the medication quite well and has no specific complaints  She has a mild cough which is improved with tessalon perls  She denies fevers, chills, nausea, vomiting or diarrhea  Her previous night sweats have resolved  Inhaler Regimen:  None    Remainder of 12 point review of systems negative except as described in HPI        The following portions of the patient's history were reviewed and updated as appropriate: allergies, current medications, past family history, past medical history, past social history, past surgical history and problem list      Objective: Vitals: Blood pressure 104/56, pulse 85, temperature 98 6 °F (37 °C), temperature source Tympanic, height 5' 3" (1 6 m), weight 49 9 kg (110 lb), SpO2 98 % , RA, Body mass index is 19 49 kg/m²  Physical Exam  Gen: Awake, alert, oriented x 3, no acute distress  HEENT: Mucous membranes moist, no oral lesions, no thrush  NECK: No accessory muscle use, JVP not elevated  Cardiac: Regular, single S1, single S2, no murmurs, no rubs, no gallops  Lungs:  Slight wheezing in RUL; clear left side  Abdomen: normoactive bowel sounds, soft nontender, nondistended, no rebound or rigidity, no guarding  Extremities: no cyanosis, no clubbing, no edema  MSK:  Strength equal in all extremities  Derm:  No rashes/lesions noted  Neuro:  Appropriate mood/affect    Labs: I have personally reviewed pertinent lab results  Lab Results   Component Value Date    WBC 11 94 (H) 07/15/2018    HGB 12 0 07/15/2018    HGB 15 0 05/24/2014     07/15/2018     Lab Results   Component Value Date    CREATININE 0 71 07/13/2018     Imaging and other studies: I have personally reviewed pertinent reports  and I have personally reviewed pertinent films in PACS  10/8/2018:    LUNGS:  There has been marked increase in the cavitary consolidation in the posterior right upper lobe, measuring approximately 7 1 x 5 9 cm (previously 2 1 x 1 9 cm)  There are multiple new clustered reticulonodular densities scattered throughout both lungs  Unchanged consolidation with bronchiectasis in the right middle lobe  There is no tracheal or endobronchial lesion      PLEURA:  Unremarkable      HEART/GREAT VESSELS:  Normal heart size  Coronary artery calcifications noted  Normal caliber thoracic aorta with minimal atherosclerotic calcifications      MEDIASTINUM AND CECI:  Unremarkable  Pulmonary Function Testing:   No pulmonary function testing available for review  Isabella Duque, [address]

## 2018-12-17 ENCOUNTER — ANESTHESIA (OUTPATIENT)
Dept: GASTROENTEROLOGY | Facility: HOSPITAL | Age: 78
End: 2018-12-17
Payer: MEDICARE

## 2018-12-17 ENCOUNTER — ANESTHESIA EVENT (OUTPATIENT)
Dept: GASTROENTEROLOGY | Facility: HOSPITAL | Age: 78
End: 2018-12-17
Payer: MEDICARE

## 2018-12-17 ENCOUNTER — HOSPITAL ENCOUNTER (OUTPATIENT)
Facility: HOSPITAL | Age: 78
Setting detail: OUTPATIENT SURGERY
Discharge: HOME | End: 2018-12-17
Attending: INTERNAL MEDICINE | Admitting: INTERNAL MEDICINE
Payer: MEDICARE

## 2018-12-17 VITALS
HEART RATE: 70 BPM | OXYGEN SATURATION: 98 % | DIASTOLIC BLOOD PRESSURE: 66 MMHG | TEMPERATURE: 98.4 F | SYSTOLIC BLOOD PRESSURE: 128 MMHG | RESPIRATION RATE: 16 BRPM

## 2018-12-17 DIAGNOSIS — A31.0 MYCOBACTERIUM AVIUM COMPLEX (HCC): Primary | ICD-10-CM

## 2018-12-17 PROCEDURE — 87116 MYCOBACTERIA CULTURE: CPT | Performed by: INTERNAL MEDICINE

## 2018-12-17 PROCEDURE — 87206 SMEAR FLUORESCENT/ACID STAI: CPT | Performed by: INTERNAL MEDICINE

## 2018-12-17 PROCEDURE — 87118 MYCOBACTERIC IDENTIFICATION: CPT | Performed by: INTERNAL MEDICINE

## 2018-12-17 PROCEDURE — 31624 DX BRONCHOSCOPE/LAVAGE: CPT | Performed by: INTERNAL MEDICINE

## 2018-12-17 RX ORDER — PROPOFOL 10 MG/ML
INJECTION, EMULSION INTRAVENOUS AS NEEDED
Status: DISCONTINUED | OUTPATIENT
Start: 2018-12-17 | End: 2018-12-17 | Stop reason: SURG

## 2018-12-17 RX ORDER — SODIUM CHLORIDE 9 MG/ML
INJECTION, SOLUTION INTRAVENOUS CONTINUOUS PRN
Status: DISCONTINUED | OUTPATIENT
Start: 2018-12-17 | End: 2018-12-17 | Stop reason: SURG

## 2018-12-17 RX ORDER — PROPOFOL 10 MG/ML
INJECTION, EMULSION INTRAVENOUS CONTINUOUS PRN
Status: DISCONTINUED | OUTPATIENT
Start: 2018-12-17 | End: 2018-12-17 | Stop reason: SURG

## 2018-12-17 RX ORDER — FENTANYL CITRATE 50 UG/ML
INJECTION, SOLUTION INTRAMUSCULAR; INTRAVENOUS AS NEEDED
Status: DISCONTINUED | OUTPATIENT
Start: 2018-12-17 | End: 2018-12-17 | Stop reason: SURG

## 2018-12-17 RX ADMIN — SODIUM CHLORIDE: 0.9 INJECTION, SOLUTION INTRAVENOUS at 10:05

## 2018-12-17 RX ADMIN — FENTANYL CITRATE 50 MCG: 50 INJECTION INTRAMUSCULAR; INTRAVENOUS at 11:21

## 2018-12-17 RX ADMIN — PROPOFOL 100 MCG/KG/MIN: 10 INJECTION, EMULSION INTRAVENOUS at 11:21

## 2018-12-17 RX ADMIN — FENTANYL CITRATE 25 MCG: 50 INJECTION INTRAMUSCULAR; INTRAVENOUS at 11:25

## 2018-12-17 RX ADMIN — FENTANYL CITRATE 25 MCG: 50 INJECTION INTRAMUSCULAR; INTRAVENOUS at 11:28

## 2018-12-17 RX ADMIN — PROPOFOL 110 MG: 10 INJECTION, EMULSION INTRAVENOUS at 11:21

## 2018-12-17 RX ADMIN — LIDOCAINE HYDROCHLORIDE 60 MG: 20 INJECTION, SOLUTION INTRAVENOUS at 11:21

## 2018-12-17 NOTE — ANESTHESIA POSTPROCEDURE EVALUATION
Post-Op Assessment Note      CV Status:  Stable    Mental Status:  Alert and awake    Hydration Status:  Euvolemic    PONV Controlled:  Controlled    Airway Patency:  Patent and adequate    Post Op Vitals Reviewed: Yes          Staff: CRNA           BP   153/88   Temp   98 4   Pulse  103   Resp      SpO2   98

## 2018-12-17 NOTE — DISCHARGE INSTRUCTIONS
Flexible Bronchoscopy     WHAT YOU NEED TO KNOW:     A flexible bronchoscopy is a procedure to look inside your respiratory system  Healthcare providers use a bronchoscope, which is a soft, bendable tube with a light and tiny camera on the end  Pictures of your respiratory system appear on a monitor during the procedure  DISCHARGE INSTRUCTIONS:     Medicines:   · Pain medicine: You may be given medicine to take away or decrease pain  Do not wait until the pain is severe before you take your medicine  · Take your medicine as directed  Contact your healthcare provider if you think your medicine is not helping or if you have side effects  Tell him or her if you are allergic to any medicine  Keep a list of the medicines, vitamins, and herbs you take  Include the amounts, and when and why you take them  Bring the list or the pill bottles to follow-up visits  Carry your medicine list with you in case of an emergency  Follow up with your healthcare provider as directed: Ask when you should expect the results of your procedure  Write down your questions so you remember to ask them during your visits  Self-care:   · Do not cough or clear your throat for a few days  This will help prevent bleeding  · Do not smoke  Smoking can cause severe damage to your airway  Ask your healthcare provider for information if you need help quitting  Contact your healthcare provider if:   · You have a fever  · Your pain does not go away, even after you take medicine  · You have new symptoms or your symptoms are worse than before  · You have questions or concerns about your condition or care  Seek care immediately or call 911 if:   · You cannot swallow  · You cough up blood  · You are confused and dizzy or feel like you are going to faint  · You have shortness of breath  · Your lips or nails turn blue      · You have chest pain or feel like your heart is beating faster than normal     © 2017 [facility] [place] [address] is for End User's use only and may not be sold, redistributed or otherwise used for commercial purposes  All illustrations and images included in CareNotes® are the copyrighted property of [employer] D [employer] M [employer]  or Michele Liang  The above information is an  only  It is not intended as medical advice for individual conditions or treatments  Talk to your doctor, nurse or pharmacist before following any medical regimen to see if it is safe and effective for you

## 2018-12-17 NOTE — ANESTHESIA PREPROCEDURE EVALUATION
Review of Systems/Medical History  Patient summary reviewed        Cardiovascular  EKG reviewed, Negative cardio ROS    Pulmonary  Pneumonia (Mycobacterium avium complex ),        GI/Hepatic  Negative GI/hepatic ROS          Negative  ROS        Endo/Other  History of thyroid disease , hypothyroidism,      GYN  Negative gynecology ROS          Hematology  Negative hematology ROS      Musculoskeletal  Negative musculoskeletal ROS        Neurology  Negative neurology ROS      Psychology   Depression ,              Physical Exam    Airway    Mallampati score: II  TM Distance: >3 FB  Neck ROM: full     Dental       Cardiovascular  Comment: Negative ROS, Cardiovascular exam normal    Pulmonary  Pulmonary exam normal     Other Findings        Anesthesia Plan  ASA Score- 2     Anesthesia Type- IV sedation with anesthesia with ASA Monitors  Additional Monitors:   Airway Plan:         Plan Factors-    Induction- intravenous  Postoperative Plan-     Informed Consent- Anesthetic plan and risks discussed with patient  I personally reviewed this patient with the CRNA  Discussed and agreed on the Anesthesia Plan with the CRNA  Krystal Clemons

## 2018-12-18 NOTE — PROCEDURES
Bronchoscopy  Date/Time: 12/18/2018 10:14 AM  Performed by: Vero Beckman  Authorized by: Vero Beckman     Patient location:  Other (comment)  Other Assisting Provider: No    Universal protocol:     Procedure explained and questions answered to patient or proxy's satisfaction: yes      Relevant documents present and verified: yes      Test results available and properly labeled: yes      Radiology Images displayed and confirmed  If images not available, report reviewed: yes      Required blood products, implants, devices and special equipment available: yes      Immediately prior to procedure a time out was called: yes      Patient identity confirmed:  Verbally with patient  Indications:     Procedure Purpose: diagnostic      Indications: pneumonia/infiltrate    Sedation:     Sedation type:  Per anesthesia  Anesthesia (see MAR for exact dosages): Anesthesia method:  Topical application  Upper Airway:     Oropharnyx: normal      Epiglottis: normal      Vocal cords movement: normal      Trachea: normal      Chel:  Normal  Airway:     Airway: The bronchoscope was entered through the oropharynx into the trachea  The right and left mainstem bronchi are were examined  There were flecks of mucus present  The right lung airways were examined through the airway to the subsegmental level with no specific abnormalities  The left lung airways were examined in the left upper lobe, lingula  Procedure details:     Description: The mucus was collected from the trachea as well as right left mainstem  A BAL was performed in the right upper lobe  Procedures performed:     Lavage site:  Right upper lobe  Post-procedure details:     Chest x-ray performed: no    Final Diagnosis/Findings:      Mucus present  Awaiting final results for AFB

## 2019-01-08 LAB
M AVIUM CMPLX RRNA SPEC QL PROBE: POSITIVE
M GORDONAE RRNA SPEC QL PROBE: ABNORMAL
M KANSASII RRNA SPEC QL PROBE: ABNORMAL
M TB CMPLX RRNA SPEC QL PROBE: NEGATIVE
MYCOBACTERIUM SPEC CULT: ABNORMAL
MYCOBACTERIUM SPEC CULT: ABNORMAL
OTHER: ABNORMAL
RHODAMINE-AURAMINE STN SPEC: ABNORMAL

## 2019-02-05 DIAGNOSIS — A31.0 MYCOBACTERIUM AVIUM COMPLEX (HCC): Primary | ICD-10-CM

## 2019-02-05 RX ORDER — AZITHROMYCIN 250 MG/1
250 TABLET, FILM COATED ORAL EVERY 24 HOURS
Qty: 30 TABLET | Refills: 3 | Status: SHIPPED | OUTPATIENT
Start: 2019-02-05 | End: 2019-06-05

## 2019-02-13 DIAGNOSIS — A31.0 MYCOBACTERIUM AVIUM COMPLEX (HCC): ICD-10-CM

## 2019-02-13 DIAGNOSIS — J98.4 CAVITARY LESION OF LUNG: ICD-10-CM

## 2019-03-19 ENCOUNTER — TELEPHONE (OUTPATIENT)
Dept: PULMONOLOGY | Facility: CLINIC | Age: 79
End: 2019-03-19

## 2019-03-19 NOTE — TELEPHONE ENCOUNTER
Keren calling with some concerns  She says she takes 2 pills that are red, her Rifampin and Azithromycin  She says she has been on them for a year, but for the past couple months when she urinates, the water is red  She said it is worse in the morning and gets less throughout the day  Also when she has a BM, when she wipes it is red  Her stool is still brown though  She says when she wipes it looks like blood  She denies having any nausea, vomiting or belly pain  She is wondering if it is from the "red dye" of the pills or if something else is going on  Please advise

## 2019-04-01 ENCOUNTER — OFFICE VISIT (OUTPATIENT)
Dept: OBGYN CLINIC | Facility: CLINIC | Age: 79
End: 2019-04-01
Payer: MEDICARE

## 2019-04-01 VITALS
HEART RATE: 76 BPM | SYSTOLIC BLOOD PRESSURE: 123 MMHG | BODY MASS INDEX: 19.81 KG/M2 | WEIGHT: 111.8 LBS | HEIGHT: 63 IN | DIASTOLIC BLOOD PRESSURE: 84 MMHG

## 2019-04-01 DIAGNOSIS — M18.0 PRIMARY OSTEOARTHRITIS OF BOTH FIRST CARPOMETACARPAL JOINTS: Primary | ICD-10-CM

## 2019-04-01 PROCEDURE — 20600 DRAIN/INJ JOINT/BURSA W/O US: CPT | Performed by: ORTHOPAEDIC SURGERY

## 2019-04-01 PROCEDURE — 99213 OFFICE O/P EST LOW 20 MIN: CPT | Performed by: ORTHOPAEDIC SURGERY

## 2019-04-01 RX ORDER — LIDOCAINE HYDROCHLORIDE 10 MG/ML
0.5 INJECTION, SOLUTION INFILTRATION; PERINEURAL
Status: COMPLETED | OUTPATIENT
Start: 2019-04-01 | End: 2019-04-01

## 2019-04-01 RX ORDER — BETAMETHASONE SODIUM PHOSPHATE AND BETAMETHASONE ACETATE 3; 3 MG/ML; MG/ML
3 INJECTION, SUSPENSION INTRA-ARTICULAR; INTRALESIONAL; INTRAMUSCULAR; SOFT TISSUE
Status: COMPLETED | OUTPATIENT
Start: 2019-04-01 | End: 2019-04-01

## 2019-04-01 RX ADMIN — BETAMETHASONE SODIUM PHOSPHATE AND BETAMETHASONE ACETATE 3 MG: 3; 3 INJECTION, SUSPENSION INTRA-ARTICULAR; INTRALESIONAL; INTRAMUSCULAR; SOFT TISSUE at 15:19

## 2019-04-01 RX ADMIN — LIDOCAINE HYDROCHLORIDE 0.5 ML: 10 INJECTION, SOLUTION INFILTRATION; PERINEURAL at 15:19

## 2019-04-15 ENCOUNTER — TELEPHONE (OUTPATIENT)
Dept: PULMONOLOGY | Facility: CLINIC | Age: 79
End: 2019-04-15

## 2019-05-03 ENCOUNTER — TRANSCRIBE ORDERS (OUTPATIENT)
Dept: ADMINISTRATIVE | Facility: HOSPITAL | Age: 79
End: 2019-05-03

## 2019-05-03 DIAGNOSIS — Z12.39 BREAST SCREENING, UNSPECIFIED: Primary | ICD-10-CM

## 2019-05-10 ENCOUNTER — HOSPITAL ENCOUNTER (OUTPATIENT)
Dept: MAMMOGRAPHY | Facility: CLINIC | Age: 79
Discharge: HOME | End: 2019-05-10
Payer: MEDICARE

## 2019-05-10 VITALS — HEIGHT: 63 IN | WEIGHT: 111 LBS | BODY MASS INDEX: 19.67 KG/M2

## 2019-05-10 DIAGNOSIS — Z12.39 BREAST SCREENING, UNSPECIFIED: ICD-10-CM

## 2019-05-10 PROCEDURE — 77067 SCR MAMMO BI INCL CAD: CPT

## 2019-05-15 ENCOUNTER — TELEPHONE (OUTPATIENT)
Dept: PULMONOLOGY | Facility: CLINIC | Age: 79
End: 2019-05-15

## 2019-05-15 DIAGNOSIS — A31.0 MYCOBACTERIUM AVIUM COMPLEX (HCC): Primary | ICD-10-CM

## 2019-05-16 ENCOUNTER — APPOINTMENT (OUTPATIENT)
Dept: LAB | Facility: CLINIC | Age: 79
End: 2019-05-16
Payer: MEDICARE

## 2019-05-16 DIAGNOSIS — A31.0 MYCOBACTERIUM AVIUM COMPLEX (HCC): ICD-10-CM

## 2019-05-16 LAB
ALBUMIN SERPL BCP-MCNC: 3.4 G/DL (ref 3.5–5)
ALP SERPL-CCNC: 94 U/L (ref 46–116)
ALT SERPL W P-5'-P-CCNC: 22 U/L (ref 12–78)
ANION GAP SERPL CALCULATED.3IONS-SCNC: 9 MMOL/L (ref 4–13)
AST SERPL W P-5'-P-CCNC: 18 U/L (ref 5–45)
BILIRUB SERPL-MCNC: 0.4 MG/DL (ref 0.2–1)
BUN SERPL-MCNC: 18 MG/DL (ref 5–25)
CALCIUM SERPL-MCNC: 8.9 MG/DL (ref 8.3–10.1)
CHLORIDE SERPL-SCNC: 106 MMOL/L (ref 100–108)
CO2 SERPL-SCNC: 25 MMOL/L (ref 21–32)
CREAT SERPL-MCNC: 0.64 MG/DL (ref 0.6–1.3)
GFR SERPL CREATININE-BSD FRML MDRD: 87 ML/MIN/1.73SQ M
GLUCOSE SERPL-MCNC: 95 MG/DL (ref 65–140)
POTASSIUM SERPL-SCNC: 4 MMOL/L (ref 3.5–5.3)
PROT SERPL-MCNC: 7.2 G/DL (ref 6.4–8.2)
SODIUM SERPL-SCNC: 140 MMOL/L (ref 136–145)

## 2019-05-16 PROCEDURE — 36415 COLL VENOUS BLD VENIPUNCTURE: CPT

## 2019-05-16 PROCEDURE — 80053 COMPREHEN METABOLIC PANEL: CPT

## 2019-06-10 DIAGNOSIS — A31.0 MYCOBACTERIUM AVIUM COMPLEX (HCC): ICD-10-CM

## 2019-06-10 DIAGNOSIS — J98.4 CAVITARY LESION OF LUNG: ICD-10-CM

## 2019-07-01 ENCOUNTER — OFFICE VISIT (OUTPATIENT)
Dept: OBGYN CLINIC | Facility: CLINIC | Age: 79
End: 2019-07-01
Payer: MEDICARE

## 2019-07-01 VITALS
HEIGHT: 63 IN | DIASTOLIC BLOOD PRESSURE: 64 MMHG | BODY MASS INDEX: 19.77 KG/M2 | WEIGHT: 111.6 LBS | SYSTOLIC BLOOD PRESSURE: 100 MMHG

## 2019-07-01 DIAGNOSIS — M18.0 PRIMARY OSTEOARTHRITIS OF BOTH FIRST CARPOMETACARPAL JOINTS: Primary | ICD-10-CM

## 2019-07-01 PROCEDURE — 20600 DRAIN/INJ JOINT/BURSA W/O US: CPT | Performed by: ORTHOPAEDIC SURGERY

## 2019-07-01 PROCEDURE — 99213 OFFICE O/P EST LOW 20 MIN: CPT | Performed by: ORTHOPAEDIC SURGERY

## 2019-07-01 RX ORDER — LIDOCAINE HYDROCHLORIDE 10 MG/ML
1 INJECTION, SOLUTION INFILTRATION; PERINEURAL
Status: COMPLETED | OUTPATIENT
Start: 2019-07-01 | End: 2019-07-01

## 2019-07-01 RX ORDER — BETAMETHASONE SODIUM PHOSPHATE AND BETAMETHASONE ACETATE 3; 3 MG/ML; MG/ML
6 INJECTION, SUSPENSION INTRA-ARTICULAR; INTRALESIONAL; INTRAMUSCULAR; SOFT TISSUE
Status: COMPLETED | OUTPATIENT
Start: 2019-07-01 | End: 2019-07-01

## 2019-07-01 RX ADMIN — LIDOCAINE HYDROCHLORIDE 1 ML: 10 INJECTION, SOLUTION INFILTRATION; PERINEURAL at 14:52

## 2019-07-01 RX ADMIN — BETAMETHASONE SODIUM PHOSPHATE AND BETAMETHASONE ACETATE 6 MG: 3; 3 INJECTION, SUSPENSION INTRA-ARTICULAR; INTRALESIONAL; INTRAMUSCULAR; SOFT TISSUE at 14:52

## 2019-07-01 NOTE — PROGRESS NOTES
ASSESSMENT/PLAN:    Assessment:   Bilateral thumb CMC joint osteoarthritis    Plan:   Bilateral thumb CMC joint injections today, follow up in 3 months for repeat evaluation      _____________________________________________________  CHIEF COMPLAINT:  Chief Complaint   Patient presents with    Right Thumb - Follow-up    Left Thumb - Follow-up         SUBJECTIVE:  Jass Hoyt is a 70 y o  female who presents for evaluation bilateral thumb CMC joint osteoarthritis  Previous injections work well  Now with mild to moderate pain bilateral thumb joints without radiation worse with activity improves slightly with rest   No associated numbness or tingling  PAST MEDICAL HISTORY:  History reviewed  No pertinent past medical history  PAST SURGICAL HISTORY:  Past Surgical History:   Procedure Laterality Date    NE BRONCHOSCOPY,DIAGNOSTIC Right 7/16/2018    Procedure: Kristel Pak;  Surgeon: Stewart Pappas MD;  [facility]; Service: Pulmonary    NE BRONCHOSCOPY,DIAGNOSTIC N/A 12/17/2018    Procedure: Kristel Pak;  Surgeon: Stewart Pappas MD;  [facility];   Service: Pulmonary       FAMILY HISTORY:  Family History   Problem Relation Age of Onset    Dementia Mother     Hypertension Father     No Known Problems Daughter     No Known Problems Maternal Grandmother     No Known Problems Maternal Grandfather     No Known Problems Daughter        SOCIAL HISTORY:  Social History     Tobacco Use    Smoking status: Never Smoker    Smokeless tobacco: Never Used   Substance Use Topics    Alcohol use: No    Drug use: No       MEDICATIONS:    Current Outpatient Medications:     acetaminophen (TYLENOL) 325 mg tablet, Take 2 tablets (650 mg total) by mouth every 6 (six) hours as needed for mild pain, headaches or fever, Disp: 30 tablet, Rfl: 0    Ascorbic Acid (VITAMIN C PO), Take by mouth, Disp: , Rfl:     benzonatate (TESSALON) 200 MG capsule, Take 200 mg by mouth Three times a day, Disp: , Rfl:     busPIRone (BUSPAR) 5 mg tablet, Take 5 mg by mouth, Disp: , Rfl:     Cholecalciferol (VITAMIN D3 PO), Take by mouth, Disp: , Rfl:     Coenzyme Q10 (COQ-10 PO), Take 200 mg by mouth daily  , Disp: , Rfl:     escitalopram (LEXAPRO) 20 mg tablet, Take 20 mg by mouth daily, Disp: , Rfl: 3    LUMIGAN 0 01 % ophthalmic drops, 1 DROP INTO BOTH EYES AT BEDTIME, Disp: , Rfl: 4    rifampin (RIFADIN) 300 mg capsule, TAKE 2 CAPSULES (600 MG TOTAL) BY MOUTH DAILY, Disp: 60 capsule, Rfl: 5    TURMERIC PO, Take by mouth, Disp: , Rfl:     zinc gluconate 50 mg tablet, Take 50 mg by mouth daily, Disp: , Rfl:     ethambutol (MYAMBUTOL) 400 mg tablet, Take 2 tablets (800 mg total) by mouth daily for 90 days, Disp: 180 tablet, Rfl: 2    fluticasone-salmeterol (ADVAIR DISKUS) 100-50 mcg/dose inhaler, , Disp: , Rfl:     magnesium 30 MG tablet, Take 30 mg by mouth 2 (two) times a day, Disp: , Rfl:     timolol (TIMOPTIC-XE) 0 25 % ophthalmic gel-forming, INSTILL 1 DROP INTO LEFT EYE EVERY MORNING, Disp: , Rfl: 4    ALLERGIES:  No Known Allergies    REVIEW OF SYSTEMS:  Pertinent items are noted in HPI  A comprehensive review of systems was negative      LABS:  HgA1c: No results found for: HGBA1C  BMP:   Lab Results   Component Value Date    CALCIUM 8 9 05/16/2019    K 4 0 05/16/2019    CO2 25 05/16/2019     05/16/2019    BUN 18 05/16/2019    CREATININE 0 64 05/16/2019       _____________________________________________________  PHYSICAL EXAMINATION:  Vital signs: /64   Ht 5' 3" (1 6 m)   Wt 50 6 kg (111 lb 9 6 oz)   BMI 19 77 kg/m²   General: well developed and well nourished, alert, oriented times 3 and appears comfortable  Psychiatric: Normal  HEENT: Trachea Midline, No torticollis  Cardiovascular: No discernable arrhythmia  Pulmonary: No wheezing or stridor  Skin: No masses, erthema, lacerations, fluctation, ulcerations  Neurovascular: Sensation Intact to the Median, Ulnar, Radial Nerve, Motor Intact to the Median, Ulnar, Radial Nerve and Pulses Intact    MUSCULOSKELETAL EXAMINATION:  Extremities:  Positive shoulder sign bilateral thumbs with positive crepitation, circumduction, and tenderness to palpation bilateral thumb CMC joints    Hyper extension noted 15° bilaterally no ulnar collateral ligament laxity at the metacarpophalangeal joints      _____________________________________________________  STUDIES REVIEWED:  No Studies to review      PROCEDURES PERFORMED:  Small joint arthrocentesis: R thumb CMC  Date/Time: 7/1/2019 2:52 PM  Consent given by: patient  Supporting Documentation  Indications: pain   Procedure Details  Location: thumb - R thumb CMC  Preparation: Patient was prepped and draped in the usual sterile fashion  Needle size: 25 G  Ultrasound guidance: no  Medications administered: 1 mL lidocaine 1 %; 6 mg betamethasone acetate-betamethasone sodium phosphate 6 (3-3) mg/mL    Patient tolerance: patient tolerated the procedure well with no immediate complications  Dressing:  Sterile dressing applied    Small joint arthrocentesis: L thumb CMC  Date/Time: 7/1/2019 2:52 PM  Consent given by: patient  Supporting Documentation  Indications: pain   Procedure Details  Location: thumb - L thumb CMC  Preparation: Patient was prepped and draped in the usual sterile fashion  Needle size: 25 G  Ultrasound guidance: no  Medications administered: 1 mL lidocaine 1 %; 6 mg betamethasone acetate-betamethasone sodium phosphate 6 (3-3) mg/mL    Patient tolerance: patient tolerated the procedure well with no immediate complications  Dressing:  Sterile dressing applied

## 2019-07-11 ENCOUNTER — OFFICE VISIT (OUTPATIENT)
Dept: PULMONOLOGY | Facility: CLINIC | Age: 79
End: 2019-07-11
Payer: MEDICARE

## 2019-07-11 VITALS
BODY MASS INDEX: 19.84 KG/M2 | SYSTOLIC BLOOD PRESSURE: 110 MMHG | OXYGEN SATURATION: 98 % | TEMPERATURE: 99.5 F | WEIGHT: 112 LBS | HEIGHT: 63 IN | HEART RATE: 79 BPM | DIASTOLIC BLOOD PRESSURE: 60 MMHG | RESPIRATION RATE: 18 BRPM

## 2019-07-11 DIAGNOSIS — J98.4 CAVITARY LESION OF LUNG: ICD-10-CM

## 2019-07-11 DIAGNOSIS — A31.0 MYCOBACTERIUM AVIUM COMPLEX (HCC): Primary | ICD-10-CM

## 2019-07-11 PROCEDURE — 99214 OFFICE O/P EST MOD 30 MIN: CPT | Performed by: INTERNAL MEDICINE

## 2019-07-11 RX ORDER — BUSPIRONE HYDROCHLORIDE 7.5 MG/1
TABLET ORAL
Refills: 0 | COMMUNITY
Start: 2019-07-05

## 2019-07-11 NOTE — PROGRESS NOTES
Progress Note - Pulmonary   Fadia Keith 70 y o  female MRN: [id number]   Encounter: [id number]      Assessment/Plan:  Patient is a 79-year-old female with past medical history of FIDE who presents for routine follow-up  The patient recently discontinued her antibiotic regimen as she did not feel like taking it any more  The patient has not noticed any change in her symptoms since the discontinuation  She is not eager to restart her treatment and referred to monitor off of treatment  The patient will follow up in approximately 3 months  If her condition has worsened at that time, she would consider starting antibiotics again  Will consider repeat CT scan at that time to reassess cavity formation  She may follow up in 3 months or sooner as necessary  Subjective: The patient stopped her antibiotics about 1 month ago because she did not feel like taking it anymore  She reports she has been feeling very well  She reports her cough and congestion have resolved  She has chronic night sweats which are unchanged  She denies fevers, chills, weight loss or hemoptysis  The patient denies any functional limitations  She is able to complete all the activities she wishes to do  She has resumed her exercising as well  Inhaler Regimen:  None    Remainder of 12 point review of systems negative except as described in HPI  The following portions of the patient's history were reviewed and updated as appropriate: allergies, current medications, past family history, past medical history, past social history, past surgical history and problem list      Objective:   Vitals: Blood pressure 110/60, pulse 79, temperature 99 5 °F (37 5 °C), temperature source Tympanic, resp  rate 18, height 5' 3" (1 6 m), weight 50 8 kg (112 lb), SpO2 98 % , RA, Body mass index is 19 84 kg/m²      Physical Exam  Gen: Awake, alert, oriented x 3, no acute distress  HEENT: Mucous membranes moist, no oral lesions, no thrush  NECK: No accessory muscle use, JVP not elevated  Cardiac: Regular, single S1, single S2, no murmurs, no rubs, no gallops  Lungs: CTA B/L  Abdomen: normoactive bowel sounds, soft nontender, nondistended, no rebound or rigidity, no guarding  Extremities: no cyanosis, no clubbing, no edema  MSK:  Strength equal in all extremities  Derm:  No rashes/lesions noted  Neuro:  Appropriate mood/affect    Labs: I have personally reviewed pertinent lab results  Lab Results   Component Value Date    WBC 11 94 (H) 07/15/2018    HGB 12 0 07/15/2018    HGB 15 0 05/24/2014     07/15/2018     Lab Results   Component Value Date    CREATININE 0 64 05/16/2019      Imaging and other studies: I have personally reviewed pertinent reports  and I have personally reviewed pertinent films in PACS  10/8/2018:  LUNGS:  There has been marked increase in the cavitary consolidation in the posterior right upper lobe, measuring approximately 7 1 x 5 9 cm (previously 2 1 x 1 9 cm)  There are multiple new clustered reticulonodular densities scattered throughout both lungs  Unchanged consolidation with bronchiectasis in the right middle lobe  There is no tracheal or endobronchial lesion  PLEURA:  Unremarkable  HEART/GREAT VESSELS:  Normal heart size  Coronary artery calcifications noted  Normal caliber thoracic aorta with minimal atherosclerotic calcifications  MEDIASTINUM AND CECI:  Unremarkable  Pulmonary Function Testing:   No recent pulmonary function testing available for review  Treasure Torres

## 2019-07-31 PROBLEM — J18.9 PNEUMONIA: Status: RESOLVED | Noted: 2018-07-13 | Resolved: 2019-07-31

## 2019-07-31 PROBLEM — J18.9 PNEUMONIA OF RIGHT UPPER LOBE DUE TO INFECTIOUS ORGANISM: Status: RESOLVED | Noted: 2018-07-13 | Resolved: 2019-07-31

## 2019-09-23 ENCOUNTER — OFFICE VISIT (OUTPATIENT)
Dept: OBGYN CLINIC | Facility: CLINIC | Age: 79
End: 2019-09-23
Payer: MEDICARE

## 2019-09-23 VITALS
HEIGHT: 63 IN | WEIGHT: 115.6 LBS | DIASTOLIC BLOOD PRESSURE: 68 MMHG | SYSTOLIC BLOOD PRESSURE: 108 MMHG | BODY MASS INDEX: 20.48 KG/M2

## 2019-09-23 DIAGNOSIS — M18.0 PRIMARY OSTEOARTHRITIS OF BOTH FIRST CARPOMETACARPAL JOINTS: ICD-10-CM

## 2019-09-23 DIAGNOSIS — M18.0 OSTEOARTHRITIS OF CARPOMETACARPAL JOINTS OF BOTH THUMBS, UNSPECIFIED OSTEOARTHRITIS TYPE: Primary | ICD-10-CM

## 2019-09-23 PROCEDURE — 20600 DRAIN/INJ JOINT/BURSA W/O US: CPT | Performed by: ORTHOPAEDIC SURGERY

## 2019-09-23 PROCEDURE — 99213 OFFICE O/P EST LOW 20 MIN: CPT | Performed by: ORTHOPAEDIC SURGERY

## 2019-09-23 RX ORDER — LIDOCAINE HYDROCHLORIDE 10 MG/ML
0.5 INJECTION, SOLUTION INFILTRATION; PERINEURAL
Status: COMPLETED | OUTPATIENT
Start: 2019-09-23 | End: 2019-09-23

## 2019-09-23 RX ORDER — BETAMETHASONE SODIUM PHOSPHATE AND BETAMETHASONE ACETATE 3; 3 MG/ML; MG/ML
3 INJECTION, SUSPENSION INTRA-ARTICULAR; INTRALESIONAL; INTRAMUSCULAR; SOFT TISSUE
Status: COMPLETED | OUTPATIENT
Start: 2019-09-23 | End: 2019-09-23

## 2019-09-23 RX ADMIN — LIDOCAINE HYDROCHLORIDE 0.5 ML: 10 INJECTION, SOLUTION INFILTRATION; PERINEURAL at 15:40

## 2019-09-23 RX ADMIN — BETAMETHASONE SODIUM PHOSPHATE AND BETAMETHASONE ACETATE 3 MG: 3; 3 INJECTION, SUSPENSION INTRA-ARTICULAR; INTRALESIONAL; INTRAMUSCULAR; SOFT TISSUE at 15:40

## 2019-09-23 NOTE — PROGRESS NOTES
ASSESSMENT/PLAN:    Assessment:   B/L thumb CMC DJD    Plan:   Steroid injections provided today for b/l thumb CMC DJD    Follow Up:  3  month(s)      _____________________________________________________  CHIEF COMPLAINT:  Chief Complaint   Patient presents with    Left Thumb - Follow-up    Right Thumb - Follow-up         SUBJECTIVE:  Rica Pearson is a 70 y o  female who presents for follow up regarding b/l thumb CMC DJD  Patient states the steroid injections are still working well, providing approx 2 months relief  Still describes pain at the base of the thumbs, worse with activity  PAST MEDICAL HISTORY:  History reviewed  No pertinent past medical history  PAST SURGICAL HISTORY:  Past Surgical History:   Procedure Laterality Date    [place] BRONCHOSCOPY,DIAGNOSTIC Right 7/16/2018    Procedure: Mirza Vaughan;  Surgeon: Sudha Christianson MD;  [facility]; Service: Pulmonary    [place] BRONCHOSCOPY,DIAGNOSTIC N/A 12/17/2018    Procedure: Mirza Vaughan;  Surgeon: Sudha Christianson MD;  [facility];   Service: Pulmonary       FAMILY HISTORY:  Family History   Problem Relation Age of Onset    Dementia Mother     Hypertension Father     No Known Problems Daughter     No Known Problems Maternal Grandmother     No Known Problems Maternal Grandfather     No Known Problems Daughter        SOCIAL HISTORY:  Social History     Tobacco Use    Smoking status: Never Smoker    Smokeless tobacco: Never Used   Substance Use Topics    Alcohol use: No    Drug use: No       MEDICATIONS:    Current Outpatient Medications:     Cholecalciferol (VITAMIN D3 PO), Take by mouth, Disp: , Rfl:     Coenzyme Q10 (COQ-10 PO), Take 200 mg by mouth daily  , Disp: , Rfl:     escitalopram (LEXAPRO) 20 mg tablet, Take 20 mg by mouth daily, Disp: , Rfl: 3    LUMIGAN 0 01 % ophthalmic drops, 1 DROP INTO BOTH EYES AT BEDTIME, Disp: , Rfl: 4    timolol (TIMOPTIC-XE) 0 25 % ophthalmic gel-forming, INSTILL 1 DROP INTO LEFT EYE EVERY MORNING, Disp: , Rfl: 4    benzonatate (TESSALON) 200 MG capsule, Take 200 mg by mouth Three times a day, Disp: , Rfl:     busPIRone (BUSPAR) 7 5 mg tablet, , Disp: , Rfl: 0    ethambutol (MYAMBUTOL) 400 mg tablet, Take 2 tablets (800 mg total) by mouth daily for 90 days, Disp: 180 tablet, Rfl: 2    fluticasone-salmeterol (ADVAIR DISKUS) 100-50 mcg/dose inhaler, , Disp: , Rfl:     magnesium 30 MG tablet, Take 30 mg by mouth 2 (two) times a day, Disp: , Rfl:     TURMERIC PO, Take by mouth, Disp: , Rfl:     zinc gluconate 50 mg tablet, Take 50 mg by mouth daily, Disp: , Rfl:     ALLERGIES:  No Known Allergies    REVIEW OF SYSTEMS:  Pertinent items are noted in HPI  A comprehensive review of systems was negative      LABS:  HgA1c: No results found for: HGBA1C  BMP:   Lab Results   Component Value Date    CALCIUM 8 9 05/16/2019    K 4 0 05/16/2019    CO2 25 05/16/2019     05/16/2019    BUN 18 05/16/2019    CREATININE 0 64 05/16/2019           _____________________________________________________  PHYSICAL EXAMINATION:  Vital signs: /68   Ht 5' 3" (1 6 m)   Wt 52 4 kg (115 lb 9 6 oz)   BMI 20 48 kg/m²   General: well developed and well nourished, alert, oriented times 3 and appears comfortable  Psychiatric: Normal  HEENT: Trachea Midline, No torticollis  Cardiovascular: No discernable arrhythmia  Pulmonary: No wheezing or stridor  Skin: No masses, erythema, lacerations, fluctation, ulcerations  Neurovascular: Sensation Intact to the Median, Ulnar, Radial Nerve, Motor Intact to the Median, Ulnar, Radial Nerve and Pulses Intact    MUSCULOSKELETAL EXAMINATION:  LEFT SIDE:  CMC: Positive grind, Positive tendnerness CMC, Positive Shoulder Sign and Positive Hyperextension MP  RIGHT SIDE:  CMC: Positive grind, Positive tendnerness CMC, Positive Shoulder Sign and Positive Hyperextension MP    _____________________________________________________  STUDIES REVIEWED:  No Studies to review      PROCEDURES PERFORMED:  Small joint arthrocentesis: R thumb CMC  Date/Time: 9/23/2019 3:40 PM  Consent given by: patient  Supporting Documentation  Indications: pain   Procedure Details  Location: thumb - R thumb CMC  Needle size: 25 G  Ultrasound guidance: no  Approach: volar  Medications administered: 0 5 mL lidocaine 1 %; 3 mg betamethasone acetate-betamethasone sodium phosphate 6 (3-3) mg/mL    Patient tolerance: patient tolerated the procedure well with no immediate complications  Dressing:  Sterile dressing applied    Small joint arthrocentesis: L thumb CMC  Date/Time: 9/23/2019 3:40 PM  Consent given by: patient  Timeout: Immediately prior to procedure a time out was called to verify the correct patient, procedure, equipment, support staff and site/side marked as required   Supporting Documentation  Indications: pain   Procedure Details  Location: thumb - L thumb CMC  Needle size: 25 G  Ultrasound guidance: no  Approach: volar  Medications administered: 0 5 mL lidocaine 1 %; 3 mg betamethasone acetate-betamethasone sodium phosphate 6 (3-3) mg/mL    Patient tolerance: patient tolerated the procedure well with no immediate complications  Dressing:  Sterile dressing applied            Scribe Attestation    I,:   GABINO Cohen am acting as a scribe while in the presence of the attending physician :        I,:   Marvin Garcia MD personally performed the services described in this documentation    as scribed in my presence :

## 2019-10-14 ENCOUNTER — OFFICE VISIT (OUTPATIENT)
Dept: PULMONOLOGY | Facility: CLINIC | Age: 79
End: 2019-10-14
Payer: MEDICARE

## 2019-10-14 VITALS
RESPIRATION RATE: 18 BRPM | WEIGHT: 116 LBS | HEART RATE: 81 BPM | BODY MASS INDEX: 20.55 KG/M2 | SYSTOLIC BLOOD PRESSURE: 104 MMHG | OXYGEN SATURATION: 100 % | TEMPERATURE: 98.8 F | DIASTOLIC BLOOD PRESSURE: 68 MMHG | HEIGHT: 63 IN

## 2019-10-14 DIAGNOSIS — A31.0 MYCOBACTERIUM AVIUM COMPLEX (HCC): ICD-10-CM

## 2019-10-14 DIAGNOSIS — J98.4 CAVITARY LESION OF LUNG: Primary | ICD-10-CM

## 2019-10-14 PROBLEM — R05.3 CHRONIC COUGH: Status: RESOLVED | Noted: 2018-07-15 | Resolved: 2019-10-14

## 2019-10-14 PROCEDURE — 99213 OFFICE O/P EST LOW 20 MIN: CPT | Performed by: INTERNAL MEDICINE

## 2019-10-14 NOTE — PROGRESS NOTES
Progress Note - Pulmonary   Yuan Corners 70 y o  female MRN: [id number]   Encounter: [id number]      Assessment/Plan:  Patient is a 68-year-old female with past medical history significant for FIDE who presents for routine follow-up  The patient has done very well since her last visit  She denies any complaints of cough, congestion, fevers, chills, night sweats or weight loss  She is asymptomatic from her FIDE infection despite no treatment  She is working out at a high level and is quite doing this  A very significant risk benefit discussion was held with the patient at this time  Given her lack of symptoms and high exercise tolerance, the patient decided that she wants no further evaluation of her FIDE or cavity  She understands the risk for progression but would not like to proceed with any additional testing at this time  The patient's stopped the azithromycin approximately 1 month ago, the patient may discontinue medication at this time  Patient may follow up in 6 months or sooner as necessary  Subjective: The patient reports trace if any cough  She denies fevers, chills, nausea or vomiting  She is working out at [employer] without difficulty  She denies any concerns  She stopped taking azithromycin about 1 month ago  Inhaler Regimen:  None    Remainder of 12 point review of systems negative except as described in HPI  The following portions of the patient's history were reviewed and updated as appropriate: allergies, current medications, past family history, past medical history, past social history, past surgical history and problem list      Objective:   Vitals: Blood pressure 104/68, pulse 81, temperature 98 8 °F (37 1 °C), temperature source Tympanic, resp  rate 18, height 5' 3" (1 6 m), weight 52 6 kg (116 lb), SpO2 100 % , RA, Body mass index is 20 55 kg/m²      Physical Exam  Gen: Awake, alert, oriented x 3, no acute distress  HEENT: Mucous membranes moist, no oral lesions, no thrush  NECK: No accessory muscle use, JVP not elevated  Cardiac: Regular, single S1, single S2, no murmurs, no rubs, no gallops  Lungs: CTA B/L  Abdomen: normoactive bowel sounds, soft nontender, nondistended, no rebound or rigidity, no guarding  Extremities: no cyanosis, no clubbing, no edema  MSK:  Strength equal in all extremities  Derm:  No rashes/lesions noted  Neuro:  Appropriate mood/affect    Labs: I have personally reviewed pertinent lab results  Lab Results   Component Value Date    WBC 11 94 (H) 07/15/2018    HGB 12 0 07/15/2018    HGB 15 0 05/24/2014     07/15/2018     Lab Results   Component Value Date    CREATININE 0 64 05/16/2019      Imaging and other studies: I have personally reviewed pertinent reports  and I have personally reviewed pertinent films in PACS   CT Chest 10/8/2018:  LUNGS:  There has been marked increase in the cavitary consolidation in the posterior right upper lobe, measuring approximately 7 1 x 5 9 cm (previously 2 1 x 1 9 cm)  There are multiple new clustered reticulonodular densities scattered throughout both lungs  Unchanged consolidation with bronchiectasis in the right middle lobe  There is no tracheal or endobronchial lesion  PLEURA:  Unremarkable  HEART/GREAT VESSELS:  Normal heart size  Coronary artery calcifications noted  Normal caliber thoracic aorta with minimal atherosclerotic calcifications  MEDIASTINUM AND CECI:  Unremarkable  Pulmonary Function Testing:   No recent pulmonary function testing  Treasure Ramos

## 2019-10-28 ENCOUNTER — TELEPHONE (OUTPATIENT)
Dept: PULMONOLOGY | Facility: CLINIC | Age: 79
End: 2019-10-28

## 2019-10-28 ENCOUNTER — OFFICE VISIT (OUTPATIENT)
Dept: PULMONOLOGY | Facility: CLINIC | Age: 79
End: 2019-10-28
Payer: MEDICARE

## 2019-10-28 VITALS
SYSTOLIC BLOOD PRESSURE: 114 MMHG | HEART RATE: 81 BPM | BODY MASS INDEX: 20.91 KG/M2 | HEIGHT: 63 IN | DIASTOLIC BLOOD PRESSURE: 68 MMHG | OXYGEN SATURATION: 100 % | RESPIRATION RATE: 16 BRPM | WEIGHT: 118 LBS | TEMPERATURE: 98 F

## 2019-10-28 DIAGNOSIS — A31.0 MYCOBACTERIUM AVIUM COMPLEX (HCC): ICD-10-CM

## 2019-10-28 DIAGNOSIS — S22.41XA CLOSED FRACTURE OF MULTIPLE RIBS OF RIGHT SIDE, INITIAL ENCOUNTER: Primary | ICD-10-CM

## 2019-10-28 DIAGNOSIS — J98.4 CAVITARY LESION OF LUNG: ICD-10-CM

## 2019-10-28 PROCEDURE — 99214 OFFICE O/P EST MOD 30 MIN: CPT | Performed by: INTERNAL MEDICINE

## 2019-10-28 RX ORDER — LIDOCAINE 50 MG/G
1 PATCH TOPICAL DAILY
Qty: 30 PATCH | Refills: 0 | Status: SHIPPED | OUTPATIENT
Start: 2019-10-28

## 2019-10-28 NOTE — PROGRESS NOTES
Progress Note - Pulmonary   Ashley Schaeffer 70 y o  female MRN: [id number]   Encounter: [id number]      Assessment/Plan:  Patient is a 81-year-old female with past medical history significant for FIDE with cavitary upper lobe lesion who presents for acute chest pain in setting nondisplaced rib fractures  For her rib fractures, the patient was given a prescription for Lidoderm patch which she may apply to the affected region  She was instructed to limit her activity to that which she can tolerate  For her history of FIDE with cavitary lesion, this was discussed at length with the patient and her son  We reviewed the previously images on CT scan and discussed at length further evaluation  At this time, patient and her son both declined to have further evaluation including CT scans or sputum cultures given how well she is doing functionally  This may be addressed at the next visit  Plan:  - lidocaine patch  -  Symptomatic management of rib fractures - pain relief    Subjective: The patient had a fall at home in the bathroom overnight  She reports significant injury to her ribs which is causing shortness of breath  She notices difficulty walking up and down the stairs  She is accompanied by her son who supports the fact that she is more short of breath and she was previously  She denies cough, congestion or hemoptysis  Inhaler Regimen:  None    Remainder of 12 point review of systems negative except as described in HPI  The following portions of the patient's history were reviewed and updated as appropriate: allergies, current medications, past family history, past medical history, past social history, past surgical history and problem list      Objective:   Vitals: Blood pressure 114/68, pulse 81, temperature 98 °F (36 7 °C), temperature source Tympanic, resp  rate 16, height 5' 3" (1 6 m), weight 53 5 kg (118 lb), SpO2 100 % , RA, Body mass index is 20 9 kg/m²      Physical Exam  Gen: Awake, alert, oriented x 3, no acute distress  HEENT: Mucous membranes moist, no oral lesions, no thrush  NECK: No accessory muscle use, JVP not elevated  Cardiac: Regular, single S1, single S2, no murmurs, no rubs, no gallops  Lungs: decreased breath sounds at bases  Abdomen: normoactive bowel sounds, soft nontender, nondistended, no rebound or rigidity, no guarding  Extremities: no cyanosis, no clubbing, no edema; tenderness to palpation in rib cage  MSK:  Strength equal in all extremities  Derm:  No rashes/lesions noted  Neuro:  Appropriate mood/affect    Labs: I have personally reviewed pertinent lab results  Lab Results   Component Value Date    WBC 11 94 (H) 07/15/2018    HGB 12 0 07/15/2018    HGB 15 0 05/24/2014     07/15/2018     Lab Results   Component Value Date    CREATININE 0 64 05/16/2019      Imaging and other studies: I have personally reviewed pertinent reports  and I have personally reviewed pertinent films in PACS  CXR - 10/2019 (brought disk from urgent care) - non displaced rib fractures    Treasure Omalley

## 2019-10-28 NOTE — TELEPHONE ENCOUNTER
Keren calling saying she fell yesterday and broke 3 ribs  She went to [facility]  They did a CXR  She has it on a disc  It hurts to breathe  She is requesting to come in so she knows what to do next  She will be in at 140

## 2019-12-30 ENCOUNTER — TELEPHONE (OUTPATIENT)
Dept: OBGYN CLINIC | Facility: CLINIC | Age: 79
End: 2019-12-30

## 2019-12-30 NOTE — TELEPHONE ENCOUNTER
Pt left message asking when her appt was    called pt back went to voicemail and left message stating when and where her appt with Dr Sara Lackey is

## 2020-02-03 ENCOUNTER — OFFICE VISIT (OUTPATIENT)
Dept: PULMONOLOGY | Facility: CLINIC | Age: 80
End: 2020-02-03
Payer: MEDICARE

## 2020-02-03 VITALS
TEMPERATURE: 97.3 F | HEIGHT: 64 IN | OXYGEN SATURATION: 100 % | DIASTOLIC BLOOD PRESSURE: 68 MMHG | HEART RATE: 93 BPM | WEIGHT: 115 LBS | SYSTOLIC BLOOD PRESSURE: 85 MMHG | BODY MASS INDEX: 19.63 KG/M2

## 2020-02-03 DIAGNOSIS — R05.8 UPPER AIRWAY COUGH SYNDROME: Primary | ICD-10-CM

## 2020-02-03 PROCEDURE — 99214 OFFICE O/P EST MOD 30 MIN: CPT | Performed by: INTERNAL MEDICINE

## 2020-02-03 RX ORDER — BENZONATATE 100 MG/1
100 CAPSULE ORAL 3 TIMES DAILY PRN
Qty: 30 CAPSULE | Refills: 0 | Status: SHIPPED | OUTPATIENT
Start: 2020-02-03 | End: 2020-03-26 | Stop reason: SDUPTHER

## 2020-02-12 ENCOUNTER — OFFICE VISIT (OUTPATIENT)
Dept: OBGYN CLINIC | Facility: HOSPITAL | Age: 80
End: 2020-02-12
Payer: MEDICARE

## 2020-02-12 VITALS
HEIGHT: 64 IN | HEART RATE: 76 BPM | DIASTOLIC BLOOD PRESSURE: 85 MMHG | BODY MASS INDEX: 19.74 KG/M2 | WEIGHT: 115.6 LBS | SYSTOLIC BLOOD PRESSURE: 130 MMHG

## 2020-02-12 DIAGNOSIS — M18.0 PRIMARY OSTEOARTHRITIS OF BOTH FIRST CARPOMETACARPAL JOINTS: Primary | ICD-10-CM

## 2020-02-12 PROCEDURE — 99213 OFFICE O/P EST LOW 20 MIN: CPT | Performed by: ORTHOPAEDIC SURGERY

## 2020-02-12 PROCEDURE — 20600 DRAIN/INJ JOINT/BURSA W/O US: CPT | Performed by: ORTHOPAEDIC SURGERY

## 2020-02-12 RX ORDER — LIDOCAINE HYDROCHLORIDE 10 MG/ML
0.5 INJECTION, SOLUTION INFILTRATION; PERINEURAL
Status: COMPLETED | OUTPATIENT
Start: 2020-02-12 | End: 2020-02-12

## 2020-02-12 RX ORDER — BETAMETHASONE SODIUM PHOSPHATE AND BETAMETHASONE ACETATE 3; 3 MG/ML; MG/ML
3 INJECTION, SUSPENSION INTRA-ARTICULAR; INTRALESIONAL; INTRAMUSCULAR; SOFT TISSUE
Status: COMPLETED | OUTPATIENT
Start: 2020-02-12 | End: 2020-02-12

## 2020-02-12 RX ADMIN — BETAMETHASONE SODIUM PHOSPHATE AND BETAMETHASONE ACETATE 3 MG: 3; 3 INJECTION, SUSPENSION INTRA-ARTICULAR; INTRALESIONAL; INTRAMUSCULAR; SOFT TISSUE at 13:40

## 2020-02-12 RX ADMIN — LIDOCAINE HYDROCHLORIDE 0.5 ML: 10 INJECTION, SOLUTION INFILTRATION; PERINEURAL at 13:40

## 2020-02-12 NOTE — PROGRESS NOTES
ASSESSMENT/PLAN:    Assessment:   Thumb CMC Arthritis  bilateral    Plan:   The patient will proceed with bilateral thumb cmc jt steroid injections today  She has no formal restrictions at this time  Follow Up:  3  month(s)    To Do Next Visit:       General Discussions:     CMC Arthritis: The anatomy and physiology of carpometacarpal joint arthritis was discussed with the patient today in the office  Deterioration of the articular cartilage eventually leads to hypermobility at the thumb [facility] joint, resulting in joint subluxation, osteophyte formation, cystic changes within the trapezium and base of the first metacarpal, as well as subchondral sclerosis  Eventually, pain, limited mobility, and compensatory hyperextension at the metacarpophalangeal joint may develop  While normal activity and usage of the thumb joint may provide a painful experience to the patient, this typically does not result in damage to the thumb or hand  Treatment options include resting thumb spica splints to decreased joint edema, pain, and inflammation  Therapy exercises to strengthen the thenar musculature may relieve pain, but do not alter the overall continued development of osteoarthritis  Oral medications, topical medications, corticosteroid injections may decrease pain and increase overall function  Eventually, approximately 5% of patients may require surgical intervention  Operative Discussions:       _____________________________________________________  CHIEF COMPLAINT:  Chief Complaint   Patient presents with    Left Thumb - Follow-up    Right Thumb - Follow-up         SUBJECTIVE:  Laina Flower is a 70 y o  female who presents for follow up regarding Thumb CMC Arthritis  bilateral   Since last visit, Laina Flower has tried steroid injections with only partial relief  Today there is Pain  Moderate  Intermittant  Sharp and Aching to the bilateral thumb      Radiation: Yes to the  thumb  Associated symptoms: No Complaints    PAST MEDICAL HISTORY:  History reviewed  No pertinent past medical history  PAST SURGICAL HISTORY:  Past Surgical History:   Procedure Laterality Date    [place] BRONCHOSCOPY,DIAGNOSTIC Right 7/16/2018    Procedure: Donnie Triston;  Surgeon: Michelle Arndt MD;  [facility]; Service: Pulmonary    [place] BRONCHOSCOPY,DIAGNOSTIC N/A 12/17/2018    Procedure: Donnie Triston;  Surgeon: Michelle Arndt MD;  [facility];   Service: Pulmonary       FAMILY HISTORY:  Family History   Problem Relation Age of Onset    Dementia Mother     Hypertension Father     No Known Problems Daughter     No Known Problems Maternal Grandmother     No Known Problems Maternal Grandfather     No Known Problems Daughter        SOCIAL HISTORY:  Social History     Tobacco Use    Smoking status: Never Smoker    Smokeless tobacco: Never Used   Substance Use Topics    Alcohol use: No    Drug use: No       MEDICATIONS:    Current Outpatient Medications:     benzonatate (TESSALON PERLES) 100 mg capsule, Take 1 capsule (100 mg total) by mouth 3 (three) times a day as needed for cough, Disp: 30 capsule, Rfl: 0    benzonatate (TESSALON) 200 MG capsule, Take 200 mg by mouth Three times a day, Disp: , Rfl:     busPIRone (BUSPAR) 7 5 mg tablet, , Disp: , Rfl: 0    Cholecalciferol (VITAMIN D3 PO), Take by mouth, Disp: , Rfl:     Coenzyme Q10 (COQ-10 PO), Take 200 mg by mouth daily  , Disp: , Rfl:     escitalopram (LEXAPRO) 20 mg tablet, Take 20 mg by mouth daily, Disp: , Rfl: 3    ethambutol (MYAMBUTOL) 400 mg tablet, Take 2 tablets (800 mg total) by mouth daily for 90 days, Disp: 180 tablet, Rfl: 2    fluticasone (ARNUITY ELLIPTA) 100 MCG/ACT AEPB inhaler, Inhale 1 puff daily Rinse mouth after use , Disp: 1 Inhaler, Rfl: 1    fluticasone-salmeterol (ADVAIR DISKUS) 100-50 mcg/dose inhaler, , Disp: , Rfl:     lidocaine (LIDODERM) 5 %, Apply 1 patch topically daily Remove & Discard patch within 12 hours or as directed by MD, Disp: 30 patch, Rfl: 0    LUMIGAN 0 01 % ophthalmic drops, 1 DROP INTO BOTH EYES AT BEDTIME, Disp: , Rfl: 4    magnesium 30 MG tablet, Take 30 mg by mouth 2 (two) times a day, Disp: , Rfl:     timolol (TIMOPTIC-XE) 0 25 % ophthalmic gel-forming, INSTILL 1 DROP INTO LEFT EYE EVERY MORNING, Disp: , Rfl: 4    TURMERIC PO, Take by mouth, Disp: , Rfl:     zinc gluconate 50 mg tablet, Take 50 mg by mouth daily, Disp: , Rfl:     ALLERGIES:  No Known Allergies    REVIEW OF SYSTEMS:  Pertinent items are noted in HPI  LABS:  HgA1c: No results found for: HGBA1C  BMP:   Lab Results   Component Value Date    CALCIUM 8 9 05/16/2019    K 4 0 05/16/2019    CO2 25 05/16/2019     05/16/2019    BUN 18 05/16/2019    CREATININE 0 64 05/16/2019           _____________________________________________________  PHYSICAL EXAMINATION:  Vital signs: There were no vitals taken for this visit    General: well developed and well nourished, alert, oriented times 3 and appears comfortable  Psychiatric: Normal  HEENT: Trachea Midline, No torticollis  Cardiovascular: No discernable arrhythmia  Pulmonary: No wheezing or stridor  Skin: No Erythema  Neurovascular: Sensation Intact to the Median, Ulnar, Radial Nerve, Motor Intact to the Median, Ulnar, Radial Nerve and Pulses Intact    MUSCULOSKELETAL EXAMINATION:  LEFT SIDE:  CMC: Positive grind, Positive tendnerness CMC, Positive Shoulder Sign and Positive Hyperextension MP  RIGHT SIDE:  CMC: Positive grind, Positive tendnerness CMC, Positive Shoulder Sign and Positive Hyperextension MP    _____________________________________________________  STUDIES REVIEWED:  No Studies to review      PROCEDURES PERFORMED:  Small joint arthrocentesis: bilateral thumb CMC  Date/Time: 2/12/2020 1:40 PM  Consent given by: patient  Site marked: site marked  Supporting Documentation  Indications: pain   Procedure Details  Location: thumb - bilateral thumb CMC    Medications (Right): 0 5 mL lidocaine 1 %; 3 mg betamethasone acetate-betamethasone sodium phosphate 6 (3-3) mg/mLMedications (Left): 0 5 mL lidocaine 1 %; 3 mg betamethasone acetate-betamethasone sodium phosphate 6 (3-3) mg/mL   Patient tolerance: patient tolerated the procedure well with no immediate complications  Dressing:  Sterile dressing applied            Scribe Attestation    I,:   Yoseph Green am acting as a scribe while in the presence of the attending physician :        I,:   Myra Gutierres MD personally performed the services described in this documentation    as scribed in my presence :

## 2020-03-16 DIAGNOSIS — R05.8 UPPER AIRWAY COUGH SYNDROME: ICD-10-CM

## 2020-03-19 ENCOUNTER — TELEPHONE (OUTPATIENT)
Dept: PULMONOLOGY | Facility: CLINIC | Age: 80
End: 2020-03-19

## 2020-03-19 NOTE — TELEPHONE ENCOUNTER
Due to the current pandemic of COVID-19  LEFT VOICE MESSAGE FOR PATIENT TO CALL OFFICE REGARDING VIRTUAL VISIT/ caregiver returned call   And  They accepted phone visit           Patient was given the option to par take in a video/ telephone call which was accepted by the patient  Patient was informed via telephone the following: There is a possibility of a $27 fee to participate in this Virtual Visit  This is depending of your insurance company and if they will cover that cost     Patients preferred phone number to [phone]    Video option -Patients preferred e-mail: NONE      Patient informed that they will receive an e-mail 15 minutes before their scheduled time as a reminder             Any further questions patients may have they are informed to call [phone]

## 2020-03-26 ENCOUNTER — TELEMEDICINE (OUTPATIENT)
Dept: PULMONOLOGY | Facility: CLINIC | Age: 80
End: 2020-03-26
Payer: MEDICARE

## 2020-03-26 DIAGNOSIS — R05.8 UPPER AIRWAY COUGH SYNDROME: ICD-10-CM

## 2020-03-26 PROCEDURE — 99442 PR PHYS/QHP TELEPHONE EVALUATION 11-20 MIN: CPT | Performed by: INTERNAL MEDICINE

## 2020-04-08 ENCOUNTER — TELEPHONE (OUTPATIENT)
Dept: PULMONOLOGY | Facility: CLINIC | Age: 80
End: 2020-04-08

## 2020-05-11 ENCOUNTER — TELEPHONE (OUTPATIENT)
Dept: PULMONOLOGY | Facility: CLINIC | Age: 80
End: 2020-05-11

## 2020-05-12 ENCOUNTER — OFFICE VISIT (OUTPATIENT)
Dept: PULMONOLOGY | Facility: CLINIC | Age: 80
End: 2020-05-12
Payer: MEDICARE

## 2020-05-12 ENCOUNTER — TELEPHONE (OUTPATIENT)
Dept: PULMONOLOGY | Facility: CLINIC | Age: 80
End: 2020-05-12

## 2020-05-12 VITALS
WEIGHT: 110 LBS | OXYGEN SATURATION: 98 % | HEART RATE: 88 BPM | RESPIRATION RATE: 16 BRPM | BODY MASS INDEX: 18.88 KG/M2 | DIASTOLIC BLOOD PRESSURE: 80 MMHG | TEMPERATURE: 98 F | SYSTOLIC BLOOD PRESSURE: 100 MMHG

## 2020-05-12 DIAGNOSIS — A31.0 MYCOBACTERIUM AVIUM COMPLEX (HCC): ICD-10-CM

## 2020-05-12 DIAGNOSIS — R05.9 COUGH: Primary | ICD-10-CM

## 2020-05-12 PROCEDURE — 99214 OFFICE O/P EST MOD 30 MIN: CPT | Performed by: PHYSICIAN ASSISTANT

## 2020-05-12 RX ORDER — ALPRAZOLAM 0.25 MG/1
0.25 TABLET ORAL 3 TIMES DAILY PRN
COMMUNITY
Start: 2020-04-28

## 2020-05-15 ENCOUNTER — TELEPHONE (OUTPATIENT)
Dept: OTHER | Facility: OTHER | Age: 80
End: 2020-05-15

## 2020-05-22 DIAGNOSIS — R05.9 COUGH: Primary | ICD-10-CM

## 2020-05-22 RX ORDER — BENZONATATE 200 MG/1
200 CAPSULE ORAL 3 TIMES DAILY PRN
Qty: 90 CAPSULE | Refills: 1 | Status: SHIPPED | OUTPATIENT
Start: 2020-05-22 | End: 2020-06-02 | Stop reason: SDUPTHER

## 2020-06-01 ENCOUNTER — TELEPHONE (OUTPATIENT)
Dept: PULMONOLOGY | Facility: CLINIC | Age: 80
End: 2020-06-01

## 2020-06-02 ENCOUNTER — TELEMEDICINE (OUTPATIENT)
Dept: PULMONOLOGY | Facility: CLINIC | Age: 80
End: 2020-06-02
Payer: MEDICARE

## 2020-06-02 VITALS — WEIGHT: 110 LBS | BODY MASS INDEX: 18.78 KG/M2 | HEIGHT: 64 IN

## 2020-06-02 DIAGNOSIS — R05.9 COUGH: ICD-10-CM

## 2020-06-02 PROCEDURE — 99213 OFFICE O/P EST LOW 20 MIN: CPT | Performed by: INTERNAL MEDICINE

## 2020-06-02 RX ORDER — BENZONATATE 200 MG/1
200 CAPSULE ORAL 3 TIMES DAILY PRN
Qty: 90 CAPSULE | Refills: 1 | Status: SHIPPED | OUTPATIENT
Start: 2020-06-02 | End: 2021-09-22 | Stop reason: SDUPTHER

## 2020-07-02 ENCOUNTER — TELEPHONE (OUTPATIENT)
Dept: PULMONOLOGY | Facility: CLINIC | Age: 80
End: 2020-07-02

## 2020-07-02 NOTE — TELEPHONE ENCOUNTER
Patients caregiver Asmita Walker calling saying Keren has been coughing a lot the past 2 weeks  She said sometimes she is coughing so much it makes her throw up   [phone]

## 2020-07-02 NOTE — TELEPHONE ENCOUNTER
Spoke with Karyn Sanchez  She says Michaelabhishek wanted her to call because her cough is annoying her  It comes and goes  The days she does cough it is a lot  She coughs so hard sometimes that she throws up  She is taking her Tessalon Perles, Flonase and Mucinex  Denies any other symptoms  Please advise

## 2020-07-03 ENCOUNTER — TELEPHONE (OUTPATIENT)
Dept: OTHER | Facility: HOSPITAL | Age: 80
End: 2020-07-03

## 2020-07-03 NOTE — TELEPHONE ENCOUNTER
Telephone note- Pulmonary Medicine   Nicole Rose 68 y o  female MRN: [id number]    Reason for call:    Cough with postnasal drip      Pertinent details:    Reports significant postnasal drip    Will trial increase in flonase to BID, if this does not improve cough she will call the office and we can consider trial change to Atrovent nasal spray    Verbalized understanding of the plan and denied further questions    HERNANDEZ Sterling

## 2020-08-25 ENCOUNTER — OFFICE VISIT (OUTPATIENT)
Dept: PULMONOLOGY | Facility: CLINIC | Age: 80
End: 2020-08-25
Payer: MEDICARE

## 2020-08-25 ENCOUNTER — HOSPITAL ENCOUNTER (OUTPATIENT)
Dept: RADIOLOGY | Facility: HOSPITAL | Age: 80
Discharge: HOME | End: 2020-08-25
Attending: INTERNAL MEDICINE
Payer: MEDICARE

## 2020-08-25 VITALS
WEIGHT: 105 LBS | SYSTOLIC BLOOD PRESSURE: 100 MMHG | OXYGEN SATURATION: 99 % | DIASTOLIC BLOOD PRESSURE: 68 MMHG | HEIGHT: 64 IN | RESPIRATION RATE: 18 BRPM | HEART RATE: 90 BPM | TEMPERATURE: 97.8 F | BODY MASS INDEX: 17.93 KG/M2

## 2020-08-25 DIAGNOSIS — J98.4 CAVITARY LESION OF LUNG: Primary | ICD-10-CM

## 2020-08-25 DIAGNOSIS — A31.0 MYCOBACTERIUM AVIUM COMPLEX (HCC): ICD-10-CM

## 2020-08-25 DIAGNOSIS — R41.3 IMPAIRED MEMORY: ICD-10-CM

## 2020-08-25 DIAGNOSIS — J98.4 CAVITARY LESION OF LUNG: ICD-10-CM

## 2020-08-25 DIAGNOSIS — R05.9 COUGH: ICD-10-CM

## 2020-08-25 PROCEDURE — 99214 OFFICE O/P EST MOD 30 MIN: CPT | Performed by: INTERNAL MEDICINE

## 2020-08-25 PROCEDURE — 71046 X-RAY EXAM CHEST 2 VIEWS: CPT

## 2020-08-25 RX ORDER — GUAIFENESIN 600 MG
600 TABLET, EXTENDED RELEASE 12 HR ORAL EVERY 12 HOURS SCHEDULED
COMMUNITY

## 2020-08-25 NOTE — PROGRESS NOTES
Progress Note - Pulmonary   Heather Redman 68 y o  female MRN: [id number]   Encounter: [id number]      Assessment/Plan:  Patient is a 42-year-old female with past medical history significant for MAC with cavitary lung lesion who presents for follow-up  The patient is present along with her caretaker of many decades  The patient is concerned that her cough has progressed and there is more associated fatigue and lethargy  There is concern that this is a recurrence or progression of her underlying cavitary lung disease  The patient he previously declined to continue with treatment  She also had previously declined to have further workup at this time is agreeable to having additional imaging  On the CXR for today, there was enlarging lesion of the left upper lobe  Patient would benefit from a CT scan for further evaluation  Of note, the patient's caretaker spoke to the team outside the room was concern for the patient's mental status  They have noted a significant decline in her memory over the past year  The patient's caretaker is unsure if the patient would be willing to proceed with a multidrug regimen for treatment MAC or undergo bronchoscopy  I have asked the patient its caretaker and son to be available at the next meeting to discuss level of intervention patient would not be willing to tolerate given her mental status  Patient may follow up in 10 days or sooner as necessary  Orders:  Orders Placed This Encounter   Procedures    XR chest pa & lateral     Standing Status:   Future     Standing Expiration Date:   8/25/2024     Scheduling Instructions:      Bring along any outside films relating to this procedure   CT chest without contrast     Standing Status:   Future     Standing Expiration Date:   8/25/2024     Scheduling Instructions: There is no prep for this study  Please bring your insurance cards, a form of photo ID and a list of your medications with you   Arrive 15 minutes prior to your appointment time to register  On the day of your test, please bring any prior CT or MRI studies of this area with you that were not performed at a [facility]  To schedule this appointment, please contact Central Scheduling at [phone]  Order Specific Question:   What is the patient's sedation requirement? Answer:   No Sedation     Subjective: The patient has had worsened cough and fatigue  She is taking mucinex twice daily and using arnuity at night  The arnuity helps at night  Her nightime cough has improved  She is bringing up a mild quantity of clear mucus  She will have an occasional headache  She is occasionally using delysym  She has an intermittent requirement for tessalon perles  She denies fevers, chills, nausea or vomiting  She denies weight loss  She is not coughing up any blood  The caretaker reports the patient is frequently cold  She reports a recent bout of severe constipation  The patient's caretaker notes that she has cut back on her fluids recently given frequent urination  Inhaler Regimen:  Arnuity    Remainder of review of systems negative except as described in HPI  The following portions of the patient's history were reviewed and updated as appropriate: allergies, current medications, past family history, past medical history, past social history, past surgical history and problem list      Objective:   Vitals: Blood pressure 100/68, pulse 90, temperature 97 8 °F (36 6 °C), temperature source Tympanic, resp  rate 18, height 5' 4" (1 626 m), weight 47 6 kg (105 lb), SpO2 99 % , RA, Body mass index is 18 02 kg/m²      Physical Exam  Gen: Pleasant, memory difficulties, awake, alert, oriented, no acute distress, fatigued  HEENT: Mucous membranes moist, no oral lesions, no thrush  NECK: No accessory muscle use, JVP not elevated  Cardiac: RRR, single S1, single S2, no murmurs, no rubs, no gallops  Lungs: coarse breath sounds bilaterally  Abdomen: normoactive bowel sounds, soft nontender, nondistended, no rebound or rigidity, no guarding; cachetics  Extremities: no cyanosis, no clubbing, no LE edema  MSK:  Strength equal in all extremities; decreased muscle mass  Derm:  No rashes/lesions noted  Neuro:  Appropriate mood/affect    Labs: I have personally reviewed pertinent lab results  Lab Results   Component Value Date    WBC 11 94 (H) 07/15/2018    HGB 12 0 07/15/2018    HGB 15 0 05/24/2014     07/15/2018     Lab Results   Component Value Date    CREATININE 0 64 05/16/2019      Imaging and other studies: I have personally reviewed pertinent reports  and I have personally reviewed pertinent films in PACS  CXR 8/25/20  My interpretation:  Enlarging right upper lobe cavitation    Pulmonary Function Testing:   No pulmonary function testing available for review  Treasure Moy

## 2020-08-27 PROBLEM — F03.90 DEMENTIA WITHOUT BEHAVIORAL DISTURBANCE (HCC): Status: ACTIVE | Noted: 2020-08-27

## 2020-08-27 PROBLEM — R41.3 IMPAIRED MEMORY: Status: ACTIVE | Noted: 2020-08-27

## 2020-09-08 ENCOUNTER — HOSPITAL ENCOUNTER (OUTPATIENT)
Dept: RADIOLOGY | Age: 80
Discharge: HOME | End: 2020-09-08
Attending: INTERNAL MEDICINE
Payer: MEDICARE

## 2020-09-08 DIAGNOSIS — J98.4 CAVITARY LESION OF LUNG: ICD-10-CM

## 2020-09-08 PROCEDURE — G1004 CDSM NDSC: HCPCS

## 2020-09-08 PROCEDURE — 71250 CT THORAX DX C-: CPT

## 2020-09-09 ENCOUNTER — TELEPHONE (OUTPATIENT)
Dept: PULMONOLOGY | Facility: CLINIC | Age: 80
End: 2020-09-09

## 2020-09-09 NOTE — TELEPHONE ENCOUNTER
Discussed with Dr Finn Travis      - CT imaging showing increased progression of MAC, unfortunately patient forgot about her appointment last Friday, she is currently scheduled for 9/15/2020 at 10:20 a m , she is agreeable to discussing treatment options

## 2020-09-10 NOTE — TELEPHONE ENCOUNTER
This number is out of service can you please find the right number so I can inform caregiver of her upcoming appointment on 9/15

## 2020-09-11 NOTE — TELEPHONE ENCOUNTER
Called caregiver left voicemail stating that she does have an appointment with Dr Gilmar Jung 9/15 at 10:20 a m  at [facility] office, did state on a voicemail that CT does appear to have progression of MAC it would be beneficial to keep this appointment to discuss any further treatment options

## 2020-09-15 ENCOUNTER — TELEPHONE (OUTPATIENT)
Dept: PULMONOLOGY | Facility: CLINIC | Age: 80
End: 2020-09-15

## 2020-09-15 ENCOUNTER — OFFICE VISIT (OUTPATIENT)
Dept: PULMONOLOGY | Facility: CLINIC | Age: 80
End: 2020-09-15
Payer: MEDICARE

## 2020-09-15 VITALS
SYSTOLIC BLOOD PRESSURE: 118 MMHG | OXYGEN SATURATION: 99 % | HEART RATE: 88 BPM | WEIGHT: 105 LBS | HEIGHT: 60 IN | DIASTOLIC BLOOD PRESSURE: 86 MMHG | BODY MASS INDEX: 20.62 KG/M2 | TEMPERATURE: 97.3 F

## 2020-09-15 DIAGNOSIS — J98.4 CAVITARY LESION OF LUNG: Primary | ICD-10-CM

## 2020-09-15 PROCEDURE — 99214 OFFICE O/P EST MOD 30 MIN: CPT | Performed by: INTERNAL MEDICINE

## 2020-09-15 RX ORDER — FLUTICASONE PROPIONATE 50 MCG
1 SPRAY, SUSPENSION (ML) NASAL
COMMUNITY

## 2020-09-15 NOTE — PROGRESS NOTES
Progress Note - Pulmonary   Janelle Escobar 68 y o  female MRN: [id number]   Encounter: [id number]      Assessment/Plan:  Patient is a 74-year-old female with past medical history significant for MAC, cavitary lung lesions who presents for follow-up  Since last visit, the patient has undergone a CT scan which demonstrated enlargement of previous cavitary lung lesions as well as new lesions in the right lung and opacifications in the left lung concerning for MAC  The patient previously wish to discontinue therapy for the MAC which consisted with 3 drug regimen  Given the advancing findings on her CT scan, the patient wishes to resume her previous treatment regimen  An extensive discussion held with both the patient's caregiver and the patient discussed the likelihood of success is about 50 50 for eradication of the mycobacterium  The patient could achieve remission with the current therapy but close monitoring as necessary of her blood work to watch out for side effects of the therapy  The patient wishes discussed with the family but seems interested in starting treatment  A sputum cup was given for the patient to recheck for presence of MAC  If this is negative, will use base treatment off previous bronchoscopy  Given the patient's advanced symptoms as well as mild cognitive impairment, patient does not wish to pursue bronchoscopy  They understand that interval sputum cultures will be necessary to monitor progression of disease  At this time, will await family's confirmation of starting therapy  However provided this patient with scripts to check liver function prior to initiation of therapy as well as check sputum culture  Greater than 50% of a total visit lasting 30 minutes was spent counseling the patient and her caregiver on the diagnosis, treatment and monitoring of MAC  Patient may follow up in 3 months or sooner as necessary       Orders:  Orders Placed This Encounter   Procedures    AFB Culture and Stain     Standing Status:   Future     Standing Expiration Date:   9/15/2021       Subjective:   Patient is accompanied by her caretaker of 40+ years to review the results of her CT scan  The patient reports she has had persistent fatigue and cough  The patient's caretaker reports she has been eating and has actually gained weight over the past several months  She currently denies fevers, chills, nausea or vomiting but reports she is always cold  Extensive discussions were held about the possibility of treatment of her enlarging cavitary lung lesions  Inhaler Regimen:  None    Remainder of review of systems negative except as described in HPI  The following portions of the patient's history were reviewed and updated as appropriate: allergies, current medications, past family history, past medical history, past social history, past surgical history and problem list      Objective:   Vitals: Blood pressure 118/86, pulse 88, temperature (!) 97 3 °F (36 3 °C), temperature source Temporal, height 5' (1 524 m), weight 47 6 kg (105 lb), SpO2 99 % , RA, Body mass index is 20 51 kg/m²  Physical Exam  Gen: Pleasant, awake, alert, oriented x 3, no acute distress  HEENT: Mucous membranes moist, no oral lesions, no thrush  NECK: No accessory muscle use, JVP not elevated  Cardiac: RRR, single S1, single S2, no murmurs, no rubs, no gallops  Lungs:rhonchi on right side; no wheezing  Abdomen: normoactive bowel sounds, soft nontender, nondistended, no rebound or rigidity, no guarding  Extremities: no cyanosis, no clubbing, no LE edema  MSK:  Strength equal in all extremities  Derm:  No rashes/lesions noted  Neuro:  Appropriate mood/affect    Labs: I have personally reviewed pertinent lab results    Lab Results   Component Value Date    WBC 11 94 (H) 07/15/2018    HGB 12 0 07/15/2018    HGB 15 0 05/24/2014     07/15/2018     Lab Results   Component Value Date    CREATININE 0 64 05/16/2019 Imaging and other studies: I have personally reviewed pertinent reports  and I have personally reviewed pertinent films in PACS  CT Chest 9/2020  My interpretation:  Worsening cavitary lung lesions on right side; significantly progressed since 2018  Pulmonary Function Testing:   No pulmonary function testing available for review  Treasure Mauricio

## 2020-09-16 NOTE — TELEPHONE ENCOUNTER
Called patient's caregiver she had to question    1  What is a pulmonary lesion-  describe that it similar to a consolidation, which is increasing in size secondary to MAC infection    2   Patient asked if she can get a flu shot- I did say that she is clear to get flu shot

## 2020-09-17 ENCOUNTER — APPOINTMENT (OUTPATIENT)
Dept: LAB | Facility: CLINIC | Age: 80
End: 2020-09-17
Payer: MEDICARE

## 2020-09-17 DIAGNOSIS — J98.4 CAVITARY LESION OF LUNG: ICD-10-CM

## 2020-09-17 PROCEDURE — 87116 MYCOBACTERIA CULTURE: CPT

## 2020-09-17 PROCEDURE — 87206 SMEAR FLUORESCENT/ACID STAI: CPT

## 2020-09-17 PROCEDURE — 87118 MYCOBACTERIC IDENTIFICATION: CPT

## 2020-09-18 ENCOUNTER — TELEPHONE (OUTPATIENT)
Dept: PULMONOLOGY | Facility: CLINIC | Age: 80
End: 2020-09-18

## 2020-09-25 ENCOUNTER — APPOINTMENT (OUTPATIENT)
Dept: LAB | Facility: CLINIC | Age: 80
End: 2020-09-25
Payer: MEDICARE

## 2020-09-25 ENCOUNTER — TRANSCRIBE ORDERS (OUTPATIENT)
Dept: LAB | Facility: CLINIC | Age: 80
End: 2020-09-25

## 2020-09-25 DIAGNOSIS — J98.4 CAVITARY LESION OF LUNG: ICD-10-CM

## 2020-09-25 LAB
ALBUMIN SERPL BCP-MCNC: 3.1 G/DL (ref 3.5–5)
ALP SERPL-CCNC: 90 U/L (ref 46–116)
ALT SERPL W P-5'-P-CCNC: 13 U/L (ref 12–78)
ANION GAP SERPL CALCULATED.3IONS-SCNC: 8 MMOL/L (ref 4–13)
AST SERPL W P-5'-P-CCNC: 13 U/L (ref 5–45)
BASOPHILS # BLD AUTO: 0.04 THOUSANDS/ΜL (ref 0–0.1)
BASOPHILS NFR BLD AUTO: 0 % (ref 0–1)
BILIRUB SERPL-MCNC: 0.43 MG/DL (ref 0.2–1)
BUN SERPL-MCNC: 14 MG/DL (ref 5–25)
CALCIUM ALBUM COR SERPL-MCNC: 10.2 MG/DL (ref 8.3–10.1)
CALCIUM SERPL-MCNC: 9.5 MG/DL (ref 8.3–10.1)
CHLORIDE SERPL-SCNC: 103 MMOL/L (ref 100–108)
CO2 SERPL-SCNC: 28 MMOL/L (ref 21–32)
CREAT SERPL-MCNC: 0.58 MG/DL (ref 0.6–1.3)
EOSINOPHIL # BLD AUTO: 0.11 THOUSAND/ΜL (ref 0–0.61)
EOSINOPHIL NFR BLD AUTO: 1 % (ref 0–6)
ERYTHROCYTE [DISTWIDTH] IN BLOOD BY AUTOMATED COUNT: 14.7 % (ref 11.6–15.1)
GFR SERPL CREATININE-BSD FRML MDRD: 89 ML/MIN/1.73SQ M
GLUCOSE SERPL-MCNC: 93 MG/DL (ref 65–140)
HCT VFR BLD AUTO: 39.9 % (ref 34.8–46.1)
HGB BLD-MCNC: 11.9 G/DL (ref 11.5–15.4)
IMM GRANULOCYTES # BLD AUTO: 0.06 THOUSAND/UL (ref 0–0.2)
IMM GRANULOCYTES NFR BLD AUTO: 0 % (ref 0–2)
LYMPHOCYTES # BLD AUTO: 6.47 THOUSANDS/ΜL (ref 0.6–4.47)
LYMPHOCYTES NFR BLD AUTO: 45 % (ref 14–44)
MCH RBC QN AUTO: 28.3 PG (ref 26.8–34.3)
MCHC RBC AUTO-ENTMCNC: 29.8 G/DL (ref 31.4–37.4)
MCV RBC AUTO: 95 FL (ref 82–98)
MONOCYTES # BLD AUTO: 0.8 THOUSAND/ΜL (ref 0.17–1.22)
MONOCYTES NFR BLD AUTO: 6 % (ref 4–12)
NEUTROPHILS # BLD AUTO: 6.8 THOUSANDS/ΜL (ref 1.85–7.62)
NEUTS SEG NFR BLD AUTO: 48 % (ref 43–75)
NRBC BLD AUTO-RTO: 0 /100 WBCS
PLATELET # BLD AUTO: 495 THOUSANDS/UL (ref 149–390)
PMV BLD AUTO: 10 FL (ref 8.9–12.7)
POTASSIUM SERPL-SCNC: 4.5 MMOL/L (ref 3.5–5.3)
PROT SERPL-MCNC: 7.3 G/DL (ref 6.4–8.2)
RBC # BLD AUTO: 4.21 MILLION/UL (ref 3.81–5.12)
SODIUM SERPL-SCNC: 139 MMOL/L (ref 136–145)
WBC # BLD AUTO: 14.28 THOUSAND/UL (ref 4.31–10.16)

## 2020-09-25 PROCEDURE — 85025 COMPLETE CBC W/AUTO DIFF WBC: CPT

## 2020-09-25 PROCEDURE — 36415 COLL VENOUS BLD VENIPUNCTURE: CPT

## 2020-09-25 PROCEDURE — 80053 COMPREHEN METABOLIC PANEL: CPT

## 2020-09-30 DIAGNOSIS — A31.0 MYCOBACTERIUM AVIUM COMPLEX (HCC): Primary | ICD-10-CM

## 2020-09-30 RX ORDER — ETHAMBUTOL HYDROCHLORIDE 400 MG/1
15 TABLET, FILM COATED ORAL DAILY
Qty: 60 TABLET | Refills: 2 | Status: SHIPPED | OUTPATIENT
Start: 2020-09-30 | End: 2020-12-11 | Stop reason: SDUPTHER

## 2020-09-30 RX ORDER — AZITHROMYCIN 250 MG/1
250 TABLET, FILM COATED ORAL EVERY 24 HOURS
Qty: 30 TABLET | Refills: 2 | Status: SHIPPED | OUTPATIENT
Start: 2020-09-30 | End: 2020-12-11 | Stop reason: SDUPTHER

## 2020-09-30 NOTE — PROGRESS NOTES
Called and spoke with patient about her MAC diagnosis  Discussed possibility of starting IV therapy including a PICC line the patient declined at this time  Feel the IV therapy is not necessary would cause significant risk given the likelihood of cure is extremely low  This is going to be primarily suppressive therapy  I reviewed the patient's blood work is within normal limits  Prescriptions for rifampin, ethambutol and azithromycin have been placed  Patient will need to follow up in the office in 4-6 weeks  Patient will need blood work repeated in 4-6 weeks as well  Patient understood and is agreeable

## 2020-10-02 ENCOUNTER — TELEPHONE (OUTPATIENT)
Dept: PULMONOLOGY | Facility: CLINIC | Age: 80
End: 2020-10-02

## 2020-10-13 LAB
M AVIUM CMPLX RRNA SPEC QL PROBE: POSITIVE
M GORDONAE RRNA SPEC QL PROBE: ABNORMAL
M KANSASII RRNA SPEC QL PROBE: ABNORMAL
M TB CMPLX RRNA SPEC QL PROBE: NEGATIVE
OTHER: ABNORMAL

## 2020-10-15 LAB
MYCOBACTERIUM SPEC CULT: ABNORMAL
MYCOBACTERIUM SPEC CULT: ABNORMAL
RHODAMINE-AURAMINE STN SPEC: ABNORMAL

## 2020-10-20 DIAGNOSIS — R05.8 UPPER AIRWAY COUGH SYNDROME: ICD-10-CM

## 2020-10-21 ENCOUNTER — OFFICE VISIT (OUTPATIENT)
Dept: OBGYN CLINIC | Facility: HOSPITAL | Age: 80
End: 2020-10-21
Payer: MEDICARE

## 2020-10-21 VITALS
HEIGHT: 60 IN | BODY MASS INDEX: 20.18 KG/M2 | DIASTOLIC BLOOD PRESSURE: 62 MMHG | SYSTOLIC BLOOD PRESSURE: 116 MMHG | WEIGHT: 102.8 LBS

## 2020-10-21 DIAGNOSIS — M18.0 PRIMARY OSTEOARTHRITIS OF BOTH FIRST CARPOMETACARPAL JOINTS: Primary | ICD-10-CM

## 2020-10-21 PROCEDURE — 99213 OFFICE O/P EST LOW 20 MIN: CPT | Performed by: ORTHOPAEDIC SURGERY

## 2020-10-21 PROCEDURE — 20600 DRAIN/INJ JOINT/BURSA W/O US: CPT | Performed by: ORTHOPAEDIC SURGERY

## 2020-10-21 RX ORDER — BETAMETHASONE SODIUM PHOSPHATE AND BETAMETHASONE ACETATE 3; 3 MG/ML; MG/ML
3 INJECTION, SUSPENSION INTRA-ARTICULAR; INTRALESIONAL; INTRAMUSCULAR; SOFT TISSUE
Status: COMPLETED | OUTPATIENT
Start: 2020-10-21 | End: 2020-10-21

## 2020-10-21 RX ORDER — LIDOCAINE HYDROCHLORIDE 10 MG/ML
0.5 INJECTION, SOLUTION INFILTRATION; PERINEURAL
Status: COMPLETED | OUTPATIENT
Start: 2020-10-21 | End: 2020-10-21

## 2020-10-21 RX ADMIN — BETAMETHASONE SODIUM PHOSPHATE AND BETAMETHASONE ACETATE 3 MG: 3; 3 INJECTION, SUSPENSION INTRA-ARTICULAR; INTRALESIONAL; INTRAMUSCULAR; SOFT TISSUE at 11:50

## 2020-10-21 RX ADMIN — LIDOCAINE HYDROCHLORIDE 0.5 ML: 10 INJECTION, SOLUTION INFILTRATION; PERINEURAL at 11:50

## 2020-11-09 ENCOUNTER — OFFICE VISIT (OUTPATIENT)
Dept: PULMONOLOGY | Facility: CLINIC | Age: 80
End: 2020-11-09
Payer: MEDICARE

## 2020-11-09 VITALS
OXYGEN SATURATION: 97 % | WEIGHT: 104.4 LBS | RESPIRATION RATE: 18 BRPM | DIASTOLIC BLOOD PRESSURE: 60 MMHG | SYSTOLIC BLOOD PRESSURE: 114 MMHG | BODY MASS INDEX: 20.5 KG/M2 | TEMPERATURE: 97.6 F | HEIGHT: 60 IN | HEART RATE: 83 BPM

## 2020-11-09 DIAGNOSIS — Z23 NEED FOR INFLUENZA VACCINATION: ICD-10-CM

## 2020-11-09 DIAGNOSIS — A31.0 MYCOBACTERIUM AVIUM COMPLEX (HCC): Primary | ICD-10-CM

## 2020-11-09 DIAGNOSIS — R05.9 COUGH: ICD-10-CM

## 2020-11-09 DIAGNOSIS — J98.4 CAVITARY LESION OF LUNG: ICD-10-CM

## 2020-11-09 PROCEDURE — 90662 IIV NO PRSV INCREASED AG IM: CPT

## 2020-11-09 PROCEDURE — 99214 OFFICE O/P EST MOD 30 MIN: CPT | Performed by: INTERNAL MEDICINE

## 2020-11-09 PROCEDURE — G0008 ADMIN INFLUENZA VIRUS VAC: HCPCS

## 2020-11-09 RX ORDER — MIRABEGRON 50 MG/1
1 TABLET, FILM COATED, EXTENDED RELEASE ORAL DAILY
COMMUNITY
Start: 2020-10-27

## 2020-11-12 ENCOUNTER — LAB (OUTPATIENT)
Dept: LAB | Facility: CLINIC | Age: 80
End: 2020-11-12
Payer: MEDICARE

## 2020-11-12 DIAGNOSIS — A31.0 MYCOBACTERIUM AVIUM COMPLEX (HCC): ICD-10-CM

## 2020-11-12 LAB
ALBUMIN SERPL BCP-MCNC: 3.1 G/DL (ref 3.5–5)
ALP SERPL-CCNC: 88 U/L (ref 46–116)
ALT SERPL W P-5'-P-CCNC: 14 U/L (ref 12–78)
ANION GAP SERPL CALCULATED.3IONS-SCNC: 7 MMOL/L (ref 4–13)
AST SERPL W P-5'-P-CCNC: 14 U/L (ref 5–45)
BILIRUB SERPL-MCNC: 0.22 MG/DL (ref 0.2–1)
BUN SERPL-MCNC: 15 MG/DL (ref 5–25)
CALCIUM ALBUM COR SERPL-MCNC: 9.7 MG/DL (ref 8.3–10.1)
CALCIUM SERPL-MCNC: 9 MG/DL (ref 8.3–10.1)
CHLORIDE SERPL-SCNC: 105 MMOL/L (ref 100–108)
CO2 SERPL-SCNC: 28 MMOL/L (ref 21–32)
CREAT SERPL-MCNC: 0.49 MG/DL (ref 0.6–1.3)
GFR SERPL CREATININE-BSD FRML MDRD: 94 ML/MIN/1.73SQ M
GLUCOSE SERPL-MCNC: 91 MG/DL (ref 65–140)
POTASSIUM SERPL-SCNC: 4 MMOL/L (ref 3.5–5.3)
PROT SERPL-MCNC: 7.2 G/DL (ref 6.4–8.2)
SODIUM SERPL-SCNC: 140 MMOL/L (ref 136–145)

## 2020-11-12 PROCEDURE — 36415 COLL VENOUS BLD VENIPUNCTURE: CPT | Performed by: INTERNAL MEDICINE

## 2020-11-12 PROCEDURE — 80053 COMPREHEN METABOLIC PANEL: CPT | Performed by: INTERNAL MEDICINE

## 2020-12-11 ENCOUNTER — OFFICE VISIT (OUTPATIENT)
Dept: PULMONOLOGY | Facility: CLINIC | Age: 80
End: 2020-12-11
Payer: MEDICARE

## 2020-12-11 VITALS
DIASTOLIC BLOOD PRESSURE: 60 MMHG | OXYGEN SATURATION: 100 % | HEIGHT: 63 IN | WEIGHT: 103 LBS | BODY MASS INDEX: 18.25 KG/M2 | HEART RATE: 86 BPM | TEMPERATURE: 98.8 F | RESPIRATION RATE: 16 BRPM | SYSTOLIC BLOOD PRESSURE: 110 MMHG

## 2020-12-11 DIAGNOSIS — F32.A DEPRESSION, UNSPECIFIED DEPRESSION TYPE: ICD-10-CM

## 2020-12-11 DIAGNOSIS — R05.8 UPPER AIRWAY COUGH SYNDROME: ICD-10-CM

## 2020-12-11 DIAGNOSIS — J98.4 CAVITARY LESION OF LUNG: Primary | ICD-10-CM

## 2020-12-11 DIAGNOSIS — A31.0 MYCOBACTERIUM AVIUM COMPLEX (HCC): ICD-10-CM

## 2020-12-11 DIAGNOSIS — R41.3 IMPAIRED MEMORY: ICD-10-CM

## 2020-12-11 PROCEDURE — 99214 OFFICE O/P EST MOD 30 MIN: CPT | Performed by: INTERNAL MEDICINE

## 2020-12-11 RX ORDER — ETHAMBUTOL HYDROCHLORIDE 400 MG/1
15 TABLET, FILM COATED ORAL DAILY
Qty: 180 TABLET | Refills: 3 | Status: SHIPPED | OUTPATIENT
Start: 2020-12-11 | End: 2021-12-15

## 2020-12-11 RX ORDER — AZITHROMYCIN 250 MG/1
250 TABLET, FILM COATED ORAL EVERY 24 HOURS
Qty: 30 TABLET | Refills: 2 | Status: SHIPPED | OUTPATIENT
Start: 2020-12-11 | End: 2021-03-11

## 2021-01-03 NOTE — PROGRESS NOTES
Progress Note - Pulmonary   Ed Po 70 y o  female MRN: [id number]   Encounter: [id number]      Assessment/Plan:  Patient is a 82-year-old female with past medical history significant for cavitary lung disease in set of mycobacterium with recent rib fracture who presents for evaluation of a worsening cough  The patient has a likely upper respiratory viral illness with postviral cough syndrome  Will treat cough aggressively with steroid inhaler and Tessalon Perles  She was no other concerning symptoms at this time  Will hold on chest x-ray as it will be difficult to interpret in the setting of her cavitary lung disease  Will hold on CT scan to monitor for progression of symptoms  If her symptoms do not resolve with conservative therapy, patient will need repeat chest CT  Patient may follow up in 4-6 weeks or sooner as necessary  Orders:  -  Steroid inhaler  -  Tessalon perles    Subjective: The patient reports a worsening of her cough which started about 3 weeks ago  She denies sick contacts  She denied associated muscle aches, body aches, fevers or chills  The cough is unchanged over about the past 3 weeks  It is primarily dry but occasionally productive  There are no other symptoms aside from the cough  She is using cough drops with minimal benefit  The cough does not wake her from sleep at night  There are no clear triggers for the cough  She denies post nasal drip  There are no night sweats or weight loss  She notes a minor weight gain  There is no change in her shortness of breath  There is no wheezing  She reports her rib fractures healed quickly  Inhaler Regimen:  None    Remainder of review of systems negative except as described in HPI        The following portions of the patient's history were reviewed and updated as appropriate: allergies, current medications, past family history, past medical history, past social history, past surgical history and problem list  Objective:   Vitals: Blood pressure (!) 85/68, pulse 93, temperature (!) 97 3 °F (36 3 °C), height 5' 4" (1 626 m), weight 52 2 kg (115 lb), SpO2 100 % , RA, Body mass index is 19 74 kg/m²  Physical Exam  Gen: Pleasant, awake, alert, oriented x 3, no acute distress  HEENT: Mucous membranes moist, no oral lesions, no thrush  NECK: No accessory muscle use, JVP not elevated  Cardiac: RRR, single S1, single S2, no murmurs, no rubs, no gallops  Lungs: CTA b/l; no w/r/c  Abdomen: normoactive bowel sounds, soft nontender, nondistended, no rebound or rigidity, no guarding; thin  Extremities: no cyanosis, no clubbing, no LE edema  MSK:  Strength equal in all extremities  Derm:  No rashes/lesions noted  Neuro:  Appropriate mood/affect    Labs: I have personally reviewed pertinent lab results  Lab Results   Component Value Date    WBC 11 94 (H) 07/15/2018    HGB 12 0 07/15/2018    HGB 15 0 05/24/2014     07/15/2018     Lab Results   Component Value Date    CREATININE 0 64 05/16/2019        Imaging and other studies: I have personally reviewed pertinent reports  and I have personally reviewed pertinent films in PACS  CT Chest 10/18/18  My interpretation:  Upper lobe cavitary disease    Radiology findings:  Interval progression of diffuse bilateral pulmonary disease, presumably related to reported mycobacterium avium complex, including marked enlargement of the cavitary consolidation in the right upper lobe  Pulmonary Function Testing:   No pulmonary function testing available for review  Treasure Alvarez English

## 2021-02-23 ENCOUNTER — TELEPHONE (OUTPATIENT)
Dept: PULMONOLOGY | Facility: CLINIC | Age: 81
End: 2021-02-23

## 2021-02-23 NOTE — TELEPHONE ENCOUNTER
Spoke with Taiwo Lofton  She said Toribio Melton woke up today with a cough  She brought up clear mucous once  Taiwo Lofton says she normally doesn't cough, maybe once a week  She said it sounds like there is mucous in Joyce's chest   Denies SOB, wheeze, fevers, chest tightness  One of the Caregiver's son's was positive for Covid  That caregiver was there on Saturday but she was wearing a mask  Toribio Melton also had her first covid vaccine  She is not interested in being tested, but this cough worried Pat  She is taking Mucinex and has Tessalon Perles  I advised to continue this since she does not have any other symptoms  Please advise if you want her to do anything else

## 2021-02-23 NOTE — TELEPHONE ENCOUNTER
Pat the patient's caregiver calling stating the patient is coughing, has congestion and is bringing up clear mucus  She states her temp is 95 9  She states she got her first covid vaccine shot on 2/3 and her son tested positive on 2/13   Please advise [phone]

## 2021-03-17 ENCOUNTER — OFFICE VISIT (OUTPATIENT)
Dept: PULMONOLOGY | Facility: CLINIC | Age: 81
End: 2021-03-17
Payer: MEDICARE

## 2021-03-17 VITALS
SYSTOLIC BLOOD PRESSURE: 110 MMHG | TEMPERATURE: 97 F | HEIGHT: 64 IN | WEIGHT: 104 LBS | OXYGEN SATURATION: 99 % | DIASTOLIC BLOOD PRESSURE: 78 MMHG | BODY MASS INDEX: 17.75 KG/M2 | HEART RATE: 96 BPM

## 2021-03-17 DIAGNOSIS — R41.3 IMPAIRED MEMORY: ICD-10-CM

## 2021-03-17 DIAGNOSIS — A31.0 MYCOBACTERIUM AVIUM COMPLEX (HCC): Primary | ICD-10-CM

## 2021-03-17 DIAGNOSIS — R05.9 COUGH: ICD-10-CM

## 2021-03-17 DIAGNOSIS — J98.4 CAVITARY LESION OF LUNG: ICD-10-CM

## 2021-03-17 PROCEDURE — 99214 OFFICE O/P EST MOD 30 MIN: CPT | Performed by: INTERNAL MEDICINE

## 2021-03-17 RX ORDER — AZITHROMYCIN 250 MG/1
250 TABLET, FILM COATED ORAL DAILY
COMMUNITY
End: 2021-03-28

## 2021-03-17 RX ORDER — DOCUSATE SODIUM 100 MG/1
100 CAPSULE, LIQUID FILLED ORAL DAILY
COMMUNITY

## 2021-03-17 RX ORDER — MEMANTINE HYDROCHLORIDE 5 MG/1
5 TABLET ORAL 2 TIMES DAILY
COMMUNITY
Start: 2021-03-11 | End: 2022-03-11

## 2021-03-17 NOTE — PROGRESS NOTES
Progress Note - Pulmonary   Quirino Alice 68 y o  female MRN: [id number]   Encounter: [id number]      Assessment/Plan:  Patient is a 29-year-old female with past medical history significant for mycobacterium avium complex with cavitation who presents for routine follow-up  Overall the patient looks better than she did on the previous visit  She reports her cough has significantly improved  Patient has even been able to gain several lb  Mycobacterium Avium Complex   -  Check CMP [id number]  -  PRN mucinex - using twice daily  -  Continue rifampin, ethambutol, azithromycin  -  Recheck sputum cultures in April 2021 to monitor for clearance  -  No indication for repeat CT scan     Shortness of breath/deconditioning  -  Reports slight improvement in shortness of breath    Memory impairement  -  Currently following with [facility] ger[facility]    Patient may follow up in 4 months or sooner as necessary  Orders:  Orders Placed This Encounter   Procedures    Comprehensive metabolic panel     This is a patient instruction: Patient fasting for 8 hours or longer recommended  Standing Status:   Standing     Number of Occurrences:   4     Standing Expiration Date:   3/17/2022     Subjective: The patient reports she is doing well  The patient has received both of her covid vaccine on March 3rd  The patient had a contact last week with a person exposed to covid  Her caregiver reports her cough was doing well until last week when it slightly returned  The cough improves with water  The patient notes she was able to gain a few pounds and generally feels better  Inhaler Regimen:  None    Remainder of review of systems negative except as described in HPI        The following portions of the patient's history were reviewed and updated as appropriate: allergies, current medications, past family history, past medical history, past social history, past surgical history and problem list      Objective:   Vitals: Blood pressure 110/78, pulse 96, temperature (!) 97 °F (36 1 °C), temperature source Temporal, height 5' 4" (1 626 m), weight 47 2 kg (104 lb), SpO2 99 % , RA, Body mass index is 17 85 kg/m²  Physical Exam  Gen: Pleasant, awake, alert, oriented x 3, no acute distress  HEENT: Mucous membranes moist, no oral lesions, no thrush  NECK: No accessory muscle use, JVP not elevated  Cardiac: RRR, single S1, single S2, no murmurs, no rubs, no gallops  Lungs: CTA b/l  Abdomen: normoactive bowel sounds, soft nontender, nondistended, no rebound or rigidity, no guarding; thin  Extremities: no cyanosis, no clubbing, no LE edema  MSK:  Strength equal in all extremities  Derm:  No rashes/lesions noted  Neuro:  Appropriate mood/affect    Labs: I have personally reviewed pertinent lab results  Lab Results   Component Value Date    WBC 14 28 (H) 09/25/2020    HGB 11 9 09/25/2020    HGB 15 0 05/24/2014     (H) 09/25/2020     Lab Results   Component Value Date    CREATININE 0 49 (L) 11/12/2020        Imaging and other studies: I have personally reviewed pertinent reports  and I have personally reviewed pertinent films in PACS  CT Chest 9/8/20  My interpretation:  Cavitary right upper lobe lesion  Radiology findings:  LUNGS:  There has been continued increase in the cavitary consolidation in the posterior right upper lobe and now with confluent cavitary consolidation throughout the completely collapsed right middle lobe  A large confluent cavity in the posterior   segment right upper lobe measures 6 4 x 5 6 x 6 7 1 cm on image 28 of series 3 and image 113 of series 602 as compared with 7 2 x 5 1 x 5 5 cm when measured using similar technique on October 8, 2018  Cavitary consolidation completely encompasses the   right middle lobe which represents marked progression from prior examination  There are new and enlarging    A representative lesion measures 1 9 cm in the lateral right lower lobe on image 73 of series 3, either small or not present on October 8, 2018  Patchy nodular infiltrates in the left lung are similar from prior examination  Bronchiectatic changes are reidentified        PLEURA:  Consolidative cavitary change in the posterior right upper lobe and in the right middle lobe is inseparable from pleural surface  No pleural mass or pleural effusion      HEART/GREAT VESSELS:  Normal heart size  Coronary artery calcifications noted  Normal caliber thoracic aorta with minimal atherosclerotic calcifications  Heart and mediastinal contents are shifted towards the right due to loss of volume as a result of   confluent cavities and posterior right upper lobe and right middle lobe      MEDIASTINUM AND CECI:  Unchanged borderline right paratracheal node, 7 mm in short axis      CHEST WALL AND LOWER NECK:   Multi nodular thyroid gland with asymmetric enlargement of the right lobe appears unchanged  Bilateral breast implants again noted  No adenopathy  Pulmonary Function Testing:   No pulmonary function testing available for review  Anya Dia, [address]

## 2021-03-24 ENCOUNTER — OFFICE VISIT (OUTPATIENT)
Dept: OBGYN CLINIC | Facility: HOSPITAL | Age: 81
End: 2021-03-24
Payer: MEDICARE

## 2021-03-24 VITALS
SYSTOLIC BLOOD PRESSURE: 107 MMHG | DIASTOLIC BLOOD PRESSURE: 67 MMHG | BODY MASS INDEX: 17.31 KG/M2 | HEART RATE: 77 BPM | HEIGHT: 64 IN | WEIGHT: 101.4 LBS

## 2021-03-24 DIAGNOSIS — M18.0 PRIMARY OSTEOARTHRITIS OF BOTH FIRST CARPOMETACARPAL JOINTS: Primary | ICD-10-CM

## 2021-03-24 PROCEDURE — 20600 DRAIN/INJ JOINT/BURSA W/O US: CPT | Performed by: ORTHOPAEDIC SURGERY

## 2021-03-24 PROCEDURE — 99213 OFFICE O/P EST LOW 20 MIN: CPT | Performed by: ORTHOPAEDIC SURGERY

## 2021-03-24 RX ORDER — LIDOCAINE HYDROCHLORIDE 5 MG/ML
0.5 INJECTION, SOLUTION INFILTRATION; PERINEURAL
Status: COMPLETED | OUTPATIENT
Start: 2021-03-24 | End: 2021-03-24

## 2021-03-24 RX ORDER — BETAMETHASONE SODIUM PHOSPHATE AND BETAMETHASONE ACETATE 3; 3 MG/ML; MG/ML
6 INJECTION, SUSPENSION INTRA-ARTICULAR; INTRALESIONAL; INTRAMUSCULAR; SOFT TISSUE
Status: COMPLETED | OUTPATIENT
Start: 2021-03-24 | End: 2021-03-24

## 2021-03-24 RX ADMIN — LIDOCAINE HYDROCHLORIDE 0.5 ML: 5 INJECTION, SOLUTION INFILTRATION; PERINEURAL at 10:40

## 2021-03-24 RX ADMIN — BETAMETHASONE SODIUM PHOSPHATE AND BETAMETHASONE ACETATE 6 MG: 3; 3 INJECTION, SUSPENSION INTRA-ARTICULAR; INTRALESIONAL; INTRAMUSCULAR; SOFT TISSUE at 10:40

## 2021-03-24 NOTE — PROGRESS NOTES
ASSESSMENT/PLAN:    Assessment:   Thumb CMC Arthritis  bilateral    Plan:   The patient will proceed with bilateral thumb cmc jt steroid injections today  She has no formal restrictions at this time  Follow Up:  3  month(s)    To Do Next Visit:       General Discussions:     CMC Arthritis: The anatomy and physiology of carpometacarpal joint arthritis was discussed with the patient today in the office  Deterioration of the articular cartilage eventually leads to hypermobility at the thumb [facility] joint, resulting in joint subluxation, osteophyte formation, cystic changes within the trapezium and base of the first metacarpal, as well as subchondral sclerosis  Eventually, pain, limited mobility, and compensatory hyperextension at the metacarpophalangeal joint may develop  While normal activity and usage of the thumb joint may provide a painful experience to the patient, this typically does not result in damage to the thumb or hand  Treatment options include resting thumb spica splints to decreased joint edema, pain, and inflammation  Therapy exercises to strengthen the thenar musculature may relieve pain, but do not alter the overall continued development of osteoarthritis  Oral medications, topical medications, corticosteroid injections may decrease pain and increase overall function  Eventually, approximately 5% of patients may require surgical intervention  Operative Discussions:       _____________________________________________________  CHIEF COMPLAINT:  Chief Complaint   Patient presents with    Right Thumb - Follow-up    Left Thumb - Follow-up         SUBJECTIVE:  Fred Wright is a 68 y o  female who presents for follow up regarding Thumb CMC Arthritis  bilateral   Since last visit, Fred Wright has tried steroid injections with only partial relief  Today there is Pain  Moderate  Intermittant  Sharp to the bilateral thumb      Radiation: Yes to the  thumb  Associated symptoms: Patient states that on occasion her pain will radiate up her bilateral forearms  Handedness: right  Work status: n/a    PAST MEDICAL HISTORY:  History reviewed  No pertinent past medical history  PAST SURGICAL HISTORY:  Past Surgical History:   Procedure Laterality Date    [place] BRONCHOSCOPY,DIAGNOSTIC Right 7/16/2018    Procedure: Tano Davis;  Surgeon: Naya Patterson MD;  [facility]; Service: Pulmonary    UT BRONCHOSCOPY,DIAGNOSTIC N/A 12/17/2018    Procedure: Tano Davis;  Surgeon: Naya Patterson MD;  [facility];   Service: Pulmonary       FAMILY HISTORY:  Family History   Problem Relation Age of Onset    Dementia Mother     Hypertension Father     No Known Problems Daughter     No Known Problems Maternal Grandmother     No Known Problems Maternal Grandfather     No Known Problems Daughter        SOCIAL HISTORY:  Social History     Tobacco Use    Smoking status: Never Smoker    Smokeless tobacco: Never Used   Substance Use Topics    Alcohol use: No    Drug use: No       MEDICATIONS:    Current Outpatient Medications:     ALPRAZolam (XANAX) 0 25 mg tablet, Take 0 25 mg by mouth 3 (three) times a day as needed, Disp: , Rfl:     azithromycin (ZITHROMAX) 250 mg tablet, Take 250 mg by mouth daily, Disp: , Rfl:     benzonatate (TESSALON) 200 MG capsule, Take 1 capsule (200 mg total) by mouth 3 (three) times a day as needed for cough, Disp: 90 capsule, Rfl: 1    busPIRone (BUSPAR) 7 5 mg tablet, , Disp: , Rfl: 0    Cholecalciferol (VITAMIN D3 PO), Take by mouth, Disp: , Rfl:     Coenzyme Q10 (COQ-10 PO), Take 200 mg by mouth daily  , Disp: , Rfl:     docusate sodium (COLACE) 100 mg capsule, Take 100 mg by mouth daily, Disp: , Rfl:     escitalopram (LEXAPRO) 20 mg tablet, Take 20 mg by mouth daily, Disp: , Rfl: 3    ethambutol (MYAMBUTOL) 400 mg tablet, Take 2 tablets (800 mg total) by mouth daily, Disp: 180 tablet, Rfl: 3    fluticasone (ARNUITY ELLIPTA) 100 MCG/ACT AEPB inhaler, Inhale 1 puff daily Rinse mouth after use , Disp: 1 Inhaler, Rfl: 5    fluticasone (FLONASE) 50 mcg/act nasal spray, 1 spray into each nostril daily at bedtime, Disp: , Rfl:     guaiFENesin (MUCINEX) 600 mg 12 hr tablet, Take 600 mg by mouth every 12 (twelve) hours, Disp: , Rfl:     lidocaine (LIDODERM) 5 %, Apply 1 patch topically daily Remove & Discard patch within 12 hours or as directed by MD, Disp: 30 patch, Rfl: 0    LUMIGAN 0 01 % ophthalmic drops, 1 DROP INTO BOTH EYES AT BEDTIME, Disp: , Rfl: 4    magnesium 30 MG tablet, Take 30 mg by mouth 2 (two) times a day, Disp: , Rfl:     memantine (NAMENDA) 5 mg tablet, Take 5 mg by mouth 2 (two) times a day, Disp: , Rfl:     Myrbetriq 50 MG TB24, Take 1 tablet by mouth daily, Disp: , Rfl:     timolol (TIMOPTIC-XE) 0 25 % ophthalmic gel-forming, INSTILL 1 DROP INTO LEFT EYE EVERY MORNING, Disp: , Rfl: 4    TURMERIC PO, Take by mouth, Disp: , Rfl:     zinc gluconate 50 mg tablet, Take 50 mg by mouth daily, Disp: , Rfl:     ALLERGIES:  No Known Allergies    REVIEW OF SYSTEMS:  Pertinent items are noted in HPI      LABS:  HgA1c: No results found for: HGBA1C  BMP:   Lab Results   Component Value Date    CALCIUM 9 0 11/12/2020    K 4 0 11/12/2020    CO2 28 11/12/2020     11/12/2020    BUN 15 11/12/2020    CREATININE 0 49 (L) 11/12/2020           _____________________________________________________  PHYSICAL EXAMINATION:  Vital signs: /67   Pulse 77   Ht 5' 4" (1 626 m)   Wt 46 kg (101 lb 6 4 oz)   BMI 17 41 kg/m²   General: well developed and well nourished, alert, oriented times 3 and appears comfortable  Psychiatric: Normal  HEENT: Trachea Midline, No torticollis  Cardiovascular: No discernable arrhythmia  Pulmonary: No wheezing or stridor  Skin: No masses, erythema, lacerations, fluctation, ulcerations  Neurovascular: Sensation Intact to the Median, Ulnar, Radial Nerve, Motor Intact to the Median, Ulnar, Radial Nerve and Pulses Intact    MUSCULOSKELETAL EXAMINATION:  LEFT SIDE:  CMC: Positive grind, Positive tendnerness CMC, Positive Shoulder Sign and Positive Hyperextension MP, negative tinels at carpal tunnel and De Quervain:  Negative finkelstein and Negative trigger thumb  RIGHT SIDE:  CMC: Positive grind, Positive tendnerness CMC, Positive Shoulder Sign and Positive Hyperextension MP, negative tinels at carpal tunnel and De Quervain:  Negative finkelstein and Negative trigger thumb    _____________________________________________________  STUDIES REVIEWED:  No Studies to review      PROCEDURES PERFORMED:  Small joint arthrocentesis: bilateral thumb CMC  [place] Protocol:  Patient identity confirmed: verbally with patient    Supporting Documentation  Indications: pain   Procedure Details  Location: thumb - bilateral thumb CMC  Needle size: 25 G  Approach: volar    Medications (Right): 0 5 mL lidocaine 0 5 %; 6 mg betamethasone acetate-betamethasone sodium phosphate 6 (3-3) mg/mLMedications (Left): 0 5 mL lidocaine 0 5 %; 6 mg betamethasone acetate-betamethasone sodium phosphate 6 (3-3) mg/mL   Patient tolerance: patient tolerated the procedure well with no immediate complications  Dressing:  Sterile dressing applied                I interviewed, took the history and examined the patient  I discuss the case with the resident and reviewed the resident's note  I supervised the resident and I agree with the resident management plan as it was presented to me  I was present in the clinic and examined the patient

## 2021-03-26 DIAGNOSIS — A31.0 MYCOBACTERIUM AVIUM COMPLEX (HCC): ICD-10-CM

## 2021-03-28 DIAGNOSIS — A31.0 PULMONARY MYCOBACTERIAL INFECTION (HCC): ICD-10-CM

## 2021-03-28 RX ORDER — AZITHROMYCIN 250 MG/1
TABLET, FILM COATED ORAL
Qty: 30 TABLET | Refills: 2 | Status: SHIPPED | OUTPATIENT
Start: 2021-03-28 | End: 2021-06-16

## 2021-06-03 ENCOUNTER — TELEPHONE (OUTPATIENT)
Dept: PULMONOLOGY | Facility: CLINIC | Age: 81
End: 2021-06-03

## 2021-06-03 NOTE — TELEPHONE ENCOUNTER
Patients caregiver Nini alcantar saying she took [place] to the dentist today and they told her she needs a tooth extracted and to call her pulmonologist to see if she will need to be put on an antibiotic because she is taking medicine for her lungs  She is going on 7/12 and will only be getting local anesthesia  Please advise

## 2021-06-16 DIAGNOSIS — A31.0 PULMONARY MYCOBACTERIAL INFECTION (HCC): ICD-10-CM

## 2021-06-16 DIAGNOSIS — A31.0 MYCOBACTERIUM AVIUM COMPLEX (HCC): ICD-10-CM

## 2021-06-16 RX ORDER — AZITHROMYCIN 250 MG/1
TABLET, FILM COATED ORAL
Qty: 30 TABLET | Refills: 2 | Status: SHIPPED | OUTPATIENT
Start: 2021-06-16 | End: 2021-09-10

## 2021-06-25 ENCOUNTER — OFFICE VISIT (OUTPATIENT)
Dept: OBGYN CLINIC | Facility: HOSPITAL | Age: 81
End: 2021-06-25
Payer: MEDICARE

## 2021-06-25 VITALS
HEIGHT: 64 IN | HEART RATE: 87 BPM | DIASTOLIC BLOOD PRESSURE: 73 MMHG | BODY MASS INDEX: 17.24 KG/M2 | SYSTOLIC BLOOD PRESSURE: 120 MMHG | WEIGHT: 101 LBS

## 2021-06-25 DIAGNOSIS — M18.0 PRIMARY OSTEOARTHRITIS OF BOTH FIRST CARPOMETACARPAL JOINTS: Primary | ICD-10-CM

## 2021-06-25 PROCEDURE — 99213 OFFICE O/P EST LOW 20 MIN: CPT | Performed by: PHYSICIAN ASSISTANT

## 2021-06-25 PROCEDURE — 20600 DRAIN/INJ JOINT/BURSA W/O US: CPT | Performed by: PHYSICIAN ASSISTANT

## 2021-06-25 RX ORDER — BETAMETHASONE SODIUM PHOSPHATE AND BETAMETHASONE ACETATE 3; 3 MG/ML; MG/ML
3 INJECTION, SUSPENSION INTRA-ARTICULAR; INTRALESIONAL; INTRAMUSCULAR; SOFT TISSUE
Status: COMPLETED | OUTPATIENT
Start: 2021-06-25 | End: 2021-06-25

## 2021-06-25 RX ORDER — LIDOCAINE HYDROCHLORIDE 10 MG/ML
0.5 INJECTION, SOLUTION INFILTRATION; PERINEURAL
Status: COMPLETED | OUTPATIENT
Start: 2021-06-25 | End: 2021-06-25

## 2021-06-25 RX ADMIN — LIDOCAINE HYDROCHLORIDE 0.5 ML: 10 INJECTION, SOLUTION INFILTRATION; PERINEURAL at 10:59

## 2021-06-25 RX ADMIN — BETAMETHASONE SODIUM PHOSPHATE AND BETAMETHASONE ACETATE 3 MG: 3; 3 INJECTION, SUSPENSION INTRA-ARTICULAR; INTRALESIONAL; INTRAMUSCULAR; SOFT TISSUE at 10:59

## 2021-06-25 NOTE — PROGRESS NOTES
ASSESSMENT/PLAN:    Assessment:   Thumb CMC Arthritis  bilateral    Plan:   steroid injections    Follow Up:  3  month(s)    To Do Next Visit:  Reassess current conditions      _____________________________________________________  CHIEF COMPLAINT:  Chief Complaint   Patient presents with    Left Thumb - Follow-up    Right Thumb - Follow-up         SUBJECTIVE:  Mari Cardoza is a 80 y o  female who presents for follow up regarding Thumb CMC Arthritis  bilateral   Since last visit, Mari Cardoza has tried steroid injections with only partial relief  Today there is slight discomfort   to the bilateral thumb  Radiation: None  Associated symptoms: None  Handedness: right      PAST MEDICAL HISTORY:  History reviewed  No pertinent past medical history  PAST SURGICAL HISTORY:  Past Surgical History:   Procedure Laterality Date    [place] BRONCHOSCOPY,DIAGNOSTIC Right 7/16/2018    Procedure: Silvana Stockton;  Surgeon: Finn Esparza MD;  [facility]; Service: Pulmonary    IA BRONCHOSCOPY,DIAGNOSTIC N/A 12/17/2018    Procedure: Silvana Stockton;  Surgeon: Finn Esparza MD;  [facility];   Service: Pulmonary       FAMILY HISTORY:  Family History   Problem Relation Age of Onset    Dementia Mother     Hypertension Father     No Known Problems Daughter     No Known Problems Maternal Grandmother     No Known Problems Maternal Grandfather     No Known Problems Daughter        SOCIAL HISTORY:  Social History     Tobacco Use    Smoking status: Never Smoker    Smokeless tobacco: Never Used   Substance Use Topics    Alcohol use: No    Drug use: No       MEDICATIONS:    Current Outpatient Medications:     ALPRAZolam (XANAX) 0 25 mg tablet, Take 0 25 mg by mouth 3 (three) times a day as needed, Disp: , Rfl:     azithromycin (ZITHROMAX) 250 mg tablet, TAKE 1 TABLET (250 MG TOTAL) BY MOUTH EVERY 24 HOURS, Disp: 30 tablet, Rfl: 2    benzonatate (TESSALON) 200 MG capsule, Take 1 capsule (200 mg total) by mouth 3 (three) times a day as needed for cough, Disp: 90 capsule, Rfl: 1    busPIRone (BUSPAR) 7 5 mg tablet, , Disp: , Rfl: 0    Cholecalciferol (VITAMIN D3 PO), Take by mouth, Disp: , Rfl:     Coenzyme Q10 (COQ-10 PO), Take 200 mg by mouth daily  , Disp: , Rfl:     docusate sodium (COLACE) 100 mg capsule, Take 100 mg by mouth daily, Disp: , Rfl:     escitalopram (LEXAPRO) 20 mg tablet, Take 20 mg by mouth daily, Disp: , Rfl: 3    ethambutol (MYAMBUTOL) 400 mg tablet, Take 2 tablets (800 mg total) by mouth daily, Disp: 180 tablet, Rfl: 3    fluticasone (ARNUITY ELLIPTA) 100 MCG/ACT AEPB inhaler, Inhale 1 puff daily Rinse mouth after use , Disp: 1 Inhaler, Rfl: 5    fluticasone (FLONASE) 50 mcg/act nasal spray, 1 spray into each nostril daily at bedtime, Disp: , Rfl:     guaiFENesin (MUCINEX) 600 mg 12 hr tablet, Take 600 mg by mouth every 12 (twelve) hours, Disp: , Rfl:     lidocaine (LIDODERM) 5 %, Apply 1 patch topically daily Remove & Discard patch within 12 hours or as directed by MD, Disp: 30 patch, Rfl: 0    LUMIGAN 0 01 % ophthalmic drops, 1 DROP INTO BOTH EYES AT BEDTIME, Disp: , Rfl: 4    magnesium 30 MG tablet, Take 30 mg by mouth 2 (two) times a day, Disp: , Rfl:     memantine (NAMENDA) 5 mg tablet, Take 5 mg by mouth 2 (two) times a day, Disp: , Rfl:     Myrbetriq 50 MG TB24, Take 1 tablet by mouth daily, Disp: , Rfl:     rifampin (RIFADIN) 300 mg capsule, TAKE 2 CAPSULES (600 MG TOTAL) BY MOUTH DAILY, Disp: 60 capsule, Rfl: 2    timolol (TIMOPTIC-XE) 0 25 % ophthalmic gel-forming, INSTILL 1 DROP INTO LEFT EYE EVERY MORNING, Disp: , Rfl: 4    TURMERIC PO, Take by mouth, Disp: , Rfl:     zinc gluconate 50 mg tablet, Take 50 mg by mouth daily, Disp: , Rfl:     ALLERGIES:  No Known Allergies    REVIEW OF SYSTEMS:  Pertinent items are noted in HPI  A comprehensive review of systems was negative      LABS:  HgA1c: No results found for: HGBA1C  BMP:   Lab Results   Component Value Date    CALCIUM 9 0 11/12/2020    K 4 0 11/12/2020    CO2 28 11/12/2020     11/12/2020    BUN 15 11/12/2020    CREATININE 0 49 (L) 11/12/2020           _____________________________________________________  PHYSICAL EXAMINATION:  Vital signs: /73   Pulse 87   Ht 5' 4" (1 626 m)   Wt 45 8 kg (101 lb)   BMI 17 34 kg/m²   General: well developed and well nourished, alert, oriented times 3 and appears comfortable  Psychiatric: Normal  HEENT: Trachea Midline, No torticollis  Cardiovascular: No discernable arrhythmia  Pulmonary: No wheezing or stridor  Abdomen: No rebound or guarding  Extremities: No peripheral edema  Skin: No masses, erythema, lacerations, fluctation, ulcerations  Neurovascular: Sensation Intact to the Median, Ulnar, Radial Nerve, Motor Intact to the Median, Ulnar, Radial Nerve and Pulses Intact    MUSCULOSKELETAL EXAMINATION:  LEFT SIDE:  CMC: Positive tendnerness CMC, Positive Shoulder Sign, Positive Hyperextension MP and measures 30 degrees  RIGHT SIDE:  CMC: Positive grind, Positive tendnerness CMC, Positive Shoulder Sign and Positive Hyperextension MP and measures 50 degrees    _____________________________________________________  STUDIES REVIEWED:  No Studies to review      PROCEDURES PERFORMED:  Small joint arthrocentesis: bilateral thumb CMC  [place] Protocol:  Consent: Verbal consent obtained  Consent given by: patient  Time out: Immediately prior to procedure a "time out" was called to verify the correct patient, procedure, equipment, support staff and site/side marked as required    Timeout called at: 6/25/2021 9:39 AM   Supporting Documentation  Indications: pain   Procedure Details  Location: thumb - bilateral thumb CMC  Needle size: 25 G    Medications (Right): 0 5 mL lidocaine 1 %; 3 mg betamethasone acetate-betamethasone sodium phosphate 6 (3-3) mg/mLMedications (Left): 0 5 mL lidocaine 1 %; 3 mg betamethasone acetate-betamethasone sodium phosphate 6 (3-3) mg/mL   Patient tolerance: patient tolerated the procedure well with no immediate complications  Dressing:  Sterile dressing applied

## 2021-07-19 DIAGNOSIS — R05.8 UPPER AIRWAY COUGH SYNDROME: ICD-10-CM

## 2021-07-20 RX ORDER — FLUTICASONE FUROATE 100 UG/1
POWDER RESPIRATORY (INHALATION)
Qty: 30 BLISTER | Refills: 5 | Status: SHIPPED | OUTPATIENT
Start: 2021-07-20

## 2021-07-26 ENCOUNTER — OFFICE VISIT (OUTPATIENT)
Dept: PULMONOLOGY | Facility: CLINIC | Age: 81
End: 2021-07-26
Payer: MEDICARE

## 2021-07-26 ENCOUNTER — TELEPHONE (OUTPATIENT)
Dept: PULMONOLOGY | Facility: CLINIC | Age: 81
End: 2021-07-26

## 2021-07-26 VITALS
OXYGEN SATURATION: 97 % | SYSTOLIC BLOOD PRESSURE: 114 MMHG | HEIGHT: 62 IN | DIASTOLIC BLOOD PRESSURE: 70 MMHG | BODY MASS INDEX: 18.62 KG/M2 | TEMPERATURE: 97.6 F | HEART RATE: 87 BPM | WEIGHT: 101.2 LBS | RESPIRATION RATE: 18 BRPM

## 2021-07-26 DIAGNOSIS — A31.0 MYCOBACTERIUM AVIUM COMPLEX (HCC): ICD-10-CM

## 2021-07-26 DIAGNOSIS — J98.4 CAVITARY LESION OF LUNG: Primary | ICD-10-CM

## 2021-07-26 DIAGNOSIS — R05.9 COUGH: ICD-10-CM

## 2021-07-26 DIAGNOSIS — R41.3 IMPAIRED MEMORY: ICD-10-CM

## 2021-07-26 PROCEDURE — 99214 OFFICE O/P EST MOD 30 MIN: CPT | Performed by: INTERNAL MEDICINE

## 2021-07-26 RX ORDER — BROMFENAC SODIUM 0.7 MG/ML
SOLUTION/ DROPS OPHTHALMIC
COMMUNITY
Start: 2021-07-25

## 2021-07-26 RX ORDER — DORZOLAMIDE HYDROCHLORIDE AND TIMOLOL MALEATE 20; 5 MG/ML; MG/ML
SOLUTION/ DROPS OPHTHALMIC
COMMUNITY
Start: 2021-07-09

## 2021-07-26 RX ORDER — FESOTERODINE FUMARATE 8 MG/1
1 TABLET, FILM COATED, EXTENDED RELEASE ORAL DAILY
COMMUNITY
Start: 2021-07-19

## 2021-07-26 NOTE — PROGRESS NOTES
Progress Note - Pulmonary   Jamin Ramírez 80 y o  female MRN: [id number]   Encounter: [id number]      Assessment/Plan:  Patient is a 44-year-old female past medical history significant for mycobacterium avium complex, memory impairment who presents for routine follow-up  The primary reporting was done from the patient's caregiver  He reports he is approximately stable  There has been no significant changes to her status recently  She is scheduled for surgery tomorrow  Mycobacterium Avium Complex   -  Check CMP [id number]; f/u CMP this week  -  PRN mucinex - using twice daily  -  Continue rifampin, ethambutol, azithromycin  -  Recheck sputum culture at next visit to monitor for eradication  -  No indication for repeat CT scan      Shortness of breath/deconditioning  -  Stable  -  Not currently doing significant exercise but does have a foot pedal she may use     Memory impairement  -  Currently following with [facility] geriatrics  -  Recently had memantine increased    Cataract Surgery  -  May take medications after procedure tomorrow    Patient may follow up in 4-6 months or sooner as necessary  Orders:  -  F/u CMP    Subjective: The patient has plans for cataract surgery  She will have her left eye tomorrow then right eye 2 weeks later  She notes she has been doing well  The patient's caregiver reports her cough has slightly improved  She has not used the tessalon perles or mucinex recently  She is always cold  Denies fevers, chills, nausea or vomiting  The patient notes she has not been exercising much recently  She is followed by geriatrics at [facility] who is adjusting her medications  She is taking vitamin d as well  Inhaler Regimen:  Arnuity    Remainder of review of systems negative except as described in HPI        The following portions of the patient's history were reviewed and updated as appropriate: allergies, current medications, past family history, past medical history, past social history, past surgical history and problem list      Objective:   Vitals: Blood pressure 114/70, pulse 87, temperature 97 6 °F (36 4 °C), temperature source Tympanic, resp  rate 18, height 5' 2" (1 575 m), weight 45 9 kg (101 lb 3 2 oz), SpO2 97 % , RA, Body mass index is 18 51 kg/m²  Physical Exam  Gen: Pleasant, awake, alert, oriented x 3, no acute distress  HEENT: Mucous membranes moist, no oral lesions, no thrush  NECK: No accessory muscle use, JVP not elevated  Cardiac: RRR, single S1, single S2, no murmurs, no rubs, no gallops  Lungs: CTA b/l; no w/r/c  Abdomen: normoactive bowel sounds, soft nontender, nondistended, no rebound or rigidity, no guarding; thin  Extremities: no cyanosis, no clubbing, no LE edema  MSK:  Strength equal in all extremities  Derm:  No rashes/lesions noted  Neuro:  Appropriate mood/affect    Labs: I have personally reviewed pertinent lab results  Lab Results   Component Value Date    WBC 14 28 (H) 09/25/2020    HGB 11 9 09/25/2020    HGB 15 0 05/24/2014     (H) 09/25/2020     Lab Results   Component Value Date    CREATININE 0 49 (L) 11/12/2020        Imaging and other studies: I have personally reviewed pertinent reports  and I have personally reviewed pertinent films in PACS  Ct Chest 9/8/20  My interpretation:  Cavitary lung lesion    Radiology findings:  LUNGS:  There has been continued increase in the cavitary consolidation in the posterior right upper lobe and now with confluent cavitary consolidation throughout the completely collapsed right middle lobe  A large confluent cavity in the posterior segment right upper lobe measures 6 4 x 5 6 x 6 7 1 cm on image 28 of series 3 and image 113 of series 602 as compared with 7 2 x 5 1 x 5 5 cm when measured using similar technique on October 8, 2018  Cavitary consolidation completely encompasses the right middle lobe which represents marked progression from prior examination  There are new and enlarging    A representative lesion measures 1 9 cm in the lateral right lower lobe on image 73 of series 3, either small or not present on October 8, 2018  Patchy nodular infiltrates in the left lung are similar from prior examination  Bronchiectatic changes are reidentified        PLEURA:  Consolidative cavitary change in the posterior right upper lobe and in the right middle lobe is inseparable from pleural surface  No pleural mass or pleural effusion      HEART/GREAT VESSELS:  Normal heart size  Coronary artery calcifications noted  Normal caliber thoracic aorta with minimal atherosclerotic calcifications  Heart and mediastinal contents are shifted towards the right due to loss of volume as a result of   confluent cavities and posterior right upper lobe and right middle lobe      MEDIASTINUM AND CECI:  Unchanged borderline right paratracheal node, 7 mm in short axis  Pulmonary Function Testing:   No pulmonary function testing available for review  Treasure Delgado

## 2021-07-26 NOTE — TELEPHONE ENCOUNTER
Patients caretaker Bhumika Soria calling saying she has questions about Joyce's pills that she take in the morning  She spoke with the nurse for her eye surgery tomorrow and they want her to take the least amount of pills as possible tomorrow  Bhumika Soria said she takes Memantine, Ethambutol, and Rifampoin all at 7:30am  She then waits 2 hours and takes Azithromycin at 9:30am  She would like to know by Dr Chaz Camara if she needs to take these in the morning or if she can wait until after the surgery which would be around 1:00pm the earliest  She is also asking if it is okay to take them in the afternoon, should she wait 2 hours to take the Azithromycin  Please advise

## 2021-09-07 DIAGNOSIS — A31.0 MYCOBACTERIUM AVIUM COMPLEX (HCC): ICD-10-CM

## 2021-09-10 DIAGNOSIS — A31.0 PULMONARY MYCOBACTERIAL INFECTION (HCC): ICD-10-CM

## 2021-09-10 RX ORDER — AZITHROMYCIN 250 MG/1
TABLET, FILM COATED ORAL
Qty: 30 TABLET | Refills: 2 | Status: SHIPPED | OUTPATIENT
Start: 2021-09-10 | End: 2021-12-07

## 2021-09-21 ENCOUNTER — NURSE TRIAGE (OUTPATIENT)
Dept: OTHER | Facility: OTHER | Age: 81
End: 2021-09-21

## 2021-09-21 DIAGNOSIS — Z20.828 SARS-ASSOCIATED CORONAVIRUS EXPOSURE: Primary | ICD-10-CM

## 2021-09-21 NOTE — TELEPHONE ENCOUNTER
Reason for Disposition   [1] COVID-19 infection suspected by caller or triager AND [2] mild symptoms (cough, fever, or others) AND [3] no complications or SOB    Answer Assessment - Initial Assessment Questions  1  COVID-19 DIAGNOSIS: "Who made your Coronavirus (COVID-19) diagnosis?" "Was it confirmed by a positive lab test?" If not diagnosed by a HCP, ask "Are there lots of cases (community spread) where you live?" (See public health department website, if unsure)      n/a  2  COVID-19 EXPOSURE: "Was there any known exposure to Kayce before the symptoms began?" [facility] Definition of close contact: within 6 feet (2 meters) for a total of 15 minutes or more over a 24-hour period  Unsure  3  ONSET: "When did the COVID-19 symptoms start?"       A few days ago  4  WORST SYMPTOM: "What is your worst symptom?" (e g , cough, fever, shortness of breath, muscle aches)      Sneezing   5  COUGH: "Do you have a cough?" If Yes, ask: "How bad is the cough?"        Mild  6  FEVER: "Do you have a fever?" If Yes, ask: "What is your temperature, how was it measured, and when did it start?"      no  7  RESPIRATORY STATUS: "Describe your breathing?" (e g , shortness of breath, wheezing, unable to speak)       Normal  8  BETTER-SAME-WORSE: "Are you getting better, staying the same or getting worse compared to yesterday?"  If getting worse, ask, "In what way?"      Worse  9  HIGH RISK DISEASE: "Do you have any chronic medical problems?" (e g , asthma, heart or lung disease, weak immune system, obesity, etc )      Lesions in lung   10  PREGNANCY: "Is there any chance you are pregnant?" "When was your last menstrual period?"        No  11   OTHER SYMPTOMS: "Do you have any other symptoms?"  (e g , chills, fatigue, headache, loss of smell or taste, muscle pain, sore throat; new loss of smell or taste especially support the diagnosis of COVID-19)        No    Protocols used: CORONAVIRUS (COVID-19) DIAGNOSED OR SUSPECTED-ADULT-AH

## 2021-09-21 NOTE — TELEPHONE ENCOUNTER
Regarding: COVID symptomatic 2/2  ----- Message from Wen Cota RN sent at 9/21/2021  2:32 PM EDT -----  Patient's caregiver is requesting a COVID test for patient  Patient has dementia and was exposure to another caregiver in the home who tested positive  Patient is sneezing and has an occasional cough

## 2021-09-22 ENCOUNTER — TELEPHONE (OUTPATIENT)
Dept: PULMONOLOGY | Facility: CLINIC | Age: 81
End: 2021-09-22

## 2021-09-22 DIAGNOSIS — R05.9 COUGH: ICD-10-CM

## 2021-09-22 RX ORDER — BENZONATATE 200 MG/1
200 CAPSULE ORAL 3 TIMES DAILY PRN
Qty: 90 CAPSULE | Refills: 1 | Status: SHIPPED | OUTPATIENT
Start: 2021-09-22

## 2021-09-22 NOTE — TELEPHONE ENCOUNTER
Everton Castaneda, caregiver calling saying another caregiver has Covid  Joyce's  is positive  Joyce's results are still pending but they are sure she has it  She has been sneezing, coughing, is lethargic, weak, sleepy and had a head ache  She did vomit once yesterday  She had a fever this morning of 101 6, but nothing since  Everton Castaneda said you can tell she is sick and not feeling well  Her breathing is okay  They are not sure if she should go to the ER to be admitted due to her underlying lung conditions or if she should stay at home  They are also asking about the monoclonal antibodies  Please advise

## 2021-09-22 NOTE — TELEPHONE ENCOUNTER
Spoke w/ Lisandra Marie they do not have a pulse ox, I did statee any thing under 89% would cause for concern to [facility] ED, I advised her to discuss antibodies with her PCP as she may not meet the inclusion criteria given her age and BMI, finally I stated to give advil Q 6 for the next 6 hours for temperature control

## 2021-11-19 ENCOUNTER — OFFICE VISIT (OUTPATIENT)
Dept: PULMONOLOGY | Facility: CLINIC | Age: 81
End: 2021-11-19
Payer: MEDICARE

## 2021-11-19 VITALS
OXYGEN SATURATION: 99 % | DIASTOLIC BLOOD PRESSURE: 70 MMHG | WEIGHT: 103 LBS | BODY MASS INDEX: 18.95 KG/M2 | SYSTOLIC BLOOD PRESSURE: 110 MMHG | TEMPERATURE: 97 F | HEART RATE: 92 BPM | HEIGHT: 62 IN

## 2021-11-19 DIAGNOSIS — J98.4 CAVITARY LESION OF LUNG: ICD-10-CM

## 2021-11-19 DIAGNOSIS — R05.9 COUGH: Primary | ICD-10-CM

## 2021-11-19 DIAGNOSIS — A31.0 MYCOBACTERIUM AVIUM COMPLEX (HCC): ICD-10-CM

## 2021-11-19 PROCEDURE — 99214 OFFICE O/P EST MOD 30 MIN: CPT | Performed by: INTERNAL MEDICINE

## 2021-11-19 RX ORDER — ALBUTEROL SULFATE 2.5 MG/3ML
2.5 SOLUTION RESPIRATORY (INHALATION) 2 TIMES DAILY
Qty: 180 ML | Refills: 2 | Status: SHIPPED | OUTPATIENT
Start: 2021-11-19 | End: 2022-02-17

## 2021-11-19 RX ORDER — ESCITALOPRAM OXALATE 10 MG/1
TABLET ORAL
COMMUNITY
Start: 2021-09-15

## 2021-12-03 ENCOUNTER — TELEPHONE (OUTPATIENT)
Dept: PULMONOLOGY | Facility: CLINIC | Age: 81
End: 2021-12-03

## 2021-12-05 DIAGNOSIS — A31.0 PULMONARY MYCOBACTERIAL INFECTION (HCC): ICD-10-CM

## 2021-12-07 RX ORDER — AZITHROMYCIN 250 MG/1
TABLET, FILM COATED ORAL
Qty: 30 TABLET | Refills: 2 | Status: SHIPPED | OUTPATIENT
Start: 2021-12-07 | End: 2022-03-03

## 2021-12-15 DIAGNOSIS — A31.0 MYCOBACTERIUM AVIUM COMPLEX (HCC): ICD-10-CM

## 2021-12-15 RX ORDER — ETHAMBUTOL HYDROCHLORIDE 400 MG/1
15 TABLET, FILM COATED ORAL DAILY
Qty: 180 TABLET | Refills: 3 | Status: SHIPPED | OUTPATIENT
Start: 2021-12-15 | End: 2022-03-15

## 2022-03-01 DIAGNOSIS — A31.0 MYCOBACTERIUM AVIUM COMPLEX (HCC): ICD-10-CM

## 2022-03-03 DIAGNOSIS — A31.0 PULMONARY MYCOBACTERIAL INFECTION (HCC): ICD-10-CM

## 2022-03-03 RX ORDER — AZITHROMYCIN 250 MG/1
TABLET, FILM COATED ORAL
Qty: 30 TABLET | Refills: 2 | Status: SHIPPED | OUTPATIENT
Start: 2022-03-03 | End: 2022-06-24

## 2022-04-15 ENCOUNTER — APPOINTMENT (EMERGENCY)
Dept: CT IMAGING | Facility: HOSPITAL | Age: 82
End: 2022-04-15
Payer: MEDICARE

## 2022-04-15 ENCOUNTER — HOSPITAL ENCOUNTER (EMERGENCY)
Facility: HOSPITAL | Age: 82
Discharge: HOME | End: 2022-04-15
Attending: EMERGENCY MEDICINE
Payer: MEDICARE

## 2022-04-15 VITALS
DIASTOLIC BLOOD PRESSURE: 69 MMHG | HEART RATE: 93 BPM | OXYGEN SATURATION: 97 % | RESPIRATION RATE: 22 BRPM | SYSTOLIC BLOOD PRESSURE: 116 MMHG | TEMPERATURE: 98 F
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
## 2022-04-15 DIAGNOSIS — S09.90XA INJURY OF HEAD, INITIAL ENCOUNTER: ICD-10-CM

## 2022-04-15 DIAGNOSIS — S01.01XA SCALP LACERATION, INITIAL ENCOUNTER: ICD-10-CM

## 2022-04-15 PROCEDURE — 90471 IMMUNIZATION ADMIN: CPT

## 2022-04-15 PROCEDURE — 99284 EMERGENCY DEPT VISIT MOD MDM: CPT

## 2022-04-15 PROCEDURE — 99284 EMERGENCY DEPT VISIT MOD MDM: CPT | Performed by: EMERGENCY MEDICINE

## 2022-04-15 PROCEDURE — 70450 CT HEAD/BRAIN W/O DYE: CPT

## 2022-04-15 PROCEDURE — G1004 CDSM NDSC: HCPCS

## 2022-04-15 PROCEDURE — 12001 RPR S/N/AX/GEN/TRNK 2.5CM/<: CPT | Performed by: EMERGENCY MEDICINE

## 2022-04-15 PROCEDURE — 72125 CT NECK SPINE W/O DYE: CPT

## 2022-04-15 PROCEDURE — 90715 TDAP VACCINE 7 YRS/> IM: CPT | Performed by: EMERGENCY MEDICINE

## 2022-04-15 RX ORDER — LIDOCAINE HYDROCHLORIDE AND EPINEPHRINE 10; 10 MG/ML; UG/ML
5 INJECTION, SOLUTION INFILTRATION; PERINEURAL ONCE
Status: COMPLETED | OUTPATIENT
Start: 2022-04-15 | End: 2022-04-15

## 2022-04-15 RX ADMIN — LIDOCAINE HYDROCHLORIDE,EPINEPHRINE BITARTRATE 5 ML: 10; .01 INJECTION, SOLUTION INFILTRATION; PERINEURAL at 20:48

## 2022-04-15 RX ADMIN — TETANUS TOXOID, REDUCED DIPHTHERIA TOXOID AND ACELLULAR PERTUSSIS VACCINE, ADSORBED 0.5 ML: 5; 2.5; 8; 8; 2.5 SUSPENSION INTRAMUSCULAR at 20:47

## 2022-04-16 NOTE — ED PROVIDER NOTES
History  Chief Complaint   Patient presents with    Fall     Pt reports falling at no thinners/ASA, +HS, -LOC, + dizziness/nausea, Hx of dementia     HPI     29-year-old female, history of dementia, presents via private vehicle with her caregiver because she had a fall in the bathroom  Patient describes pain at the site of her head but denies additional complaints  No blood thinning medications per caregiver  Patient with no loss of consciousness  She has been her baseline per caregiver  Level 5 caveat due to patient's condition of dementia  Prior to Admission Medications   Prescriptions Last Dose Informant Patient Reported? Taking? ALPRAZolam (XANAX) 0 25 mg tablet  Self Yes No   Sig: Take 0 25 mg by mouth 3 (three) times a day as needed   Arnuity Ellipta 100 MCG/ACT AEPB inhaler  Self No No   Sig: INHALE 1 PUFF DAILY RINSE MOUTH AFTER USE     Cholecalciferol (VITAMIN D3 PO)  Self Yes No   Sig: Take by mouth   Coenzyme Q10 (COQ-10 PO)  Self Yes No   Sig: Take 200 mg by mouth daily     LUMIGAN 0 01 % ophthalmic drops  Self Yes No   Si DROP INTO BOTH EYES AT BEDTIME   Myrbetriq 50 MG TB24  Self Yes No   Sig: Take 1 tablet by mouth daily   Prolensa 0 07 % SOLN  Self Yes No   TURMERIC PO  Self Yes No   Sig: Take by mouth   Toviaz 8 MG TB24  Self Yes No   Sig: Take 1 tablet by mouth daily   azithromycin (ZITHROMAX) 250 mg tablet   No No   Sig: TAKE 1 TABLET (250 MG TOTAL) BY MOUTH EVERY 24 HOURS   benzonatate (TESSALON) 200 MG capsule  Self No No   Sig: Take 1 capsule (200 mg total) by mouth 3 (three) times a day as needed for cough   busPIRone (BUSPAR) 7 5 mg tablet  Self Yes No   docusate sodium (COLACE) 100 mg capsule  Self Yes No   Sig: Take 100 mg by mouth daily   dorzolamide-timolol (COSOPT) 22 3-6 8 MG/ML ophthalmic solution  Self Yes No   Sig: INSTILL 1 DROP INTO THE LEFT EYE THREE TIMES DAILY   escitalopram (LEXAPRO) 10 mg tablet  Self Yes No   ethambutol (MYAMBUTOL) 400 mg tablet   No No   Sig: TAKE 2 TABLETS (800 MG TOTAL) BY MOUTH DAILY   fluticasone (FLONASE) 50 mcg/act nasal spray  Self Yes No   Si spray into each nostril daily at bedtime   guaiFENesin (MUCINEX) 600 mg 12 hr tablet  Self Yes No   Sig: Take 600 mg by mouth every 12 (twelve) hours   ipratropium (ATROVENT) 0 02 % nebulizer solution   No No   Sig: Take 2 5 mL (0 5 mg total) by nebulization 2 (two) times a day   lidocaine (LIDODERM) 5 %  Self No No   Sig: Apply 1 patch topically daily Remove & Discard patch within 12 hours or as directed by MD   magnesium 30 MG tablet  Self Yes No   Sig: Take 30 mg by mouth 2 (two) times a day   memantine (NAMENDA) 10 mg tablet   Yes No   memantine (NAMENDA) 5 mg tablet  Self Yes No   Sig: Take 5 mg by mouth 2 (two) times a day   rifampin (RIFADIN) 300 mg capsule   No No   Sig: TAKE 2 CAPSULES BY MOUTH DAILY  timolol (TIMOPTIC-XE) 0 25 % ophthalmic gel-forming  Self Yes No   Sig: INSTILL 1 DROP INTO LEFT EYE EVERY MORNING   zinc gluconate 50 mg tablet  Self Yes No   Sig: Take 50 mg by mouth daily      Facility-Administered Medications: None       No past medical history on file  Past Surgical History:   Procedure Laterality Date    DE BRONCHOSCOPY,DIAGNOSTIC Right 2018    Procedure: Adriane Palma;  Surgeon: Estella Lentz MD;  [facility]; Service: Pulmonary    DE BRONCHOSCOPY,DIAGNOSTIC N/A 2018    Procedure: Adriane Palma;  Surgeon: Estella Lentz MD;  [facility]; Service: Pulmonary       Family History   Problem Relation Age of Onset    Dementia Mother     Hypertension Father     No Known Problems Daughter     No Known Problems Maternal Grandmother     No Known Problems Maternal Grandfather     No Known Problems Daughter      I have reviewed and agree with the history as documented      E-Cigarette/Vaping     E-Cigarette/Vaping Substances     Social History     Tobacco Use    Smoking status: Never Smoker    Smokeless tobacco: Never Used   Substance Use Topics    Alcohol use: No    Drug use: No       Review of Systems   Unable to perform ROS: Dementia       Physical Exam  Physical Exam  Vitals and nursing note reviewed  Constitutional:       General: She is not in acute distress  Appearance: She is well-developed  Comments: Alert, interactive at her baseline   HENT:      Head: Atraumatic  Comments: 2 cm laceration to right posterior scalp  Eyes:      Extraocular Movements: Extraocular movements intact  Pupils: Pupils are equal, round, and reactive to light  Cardiovascular:      Rate and Rhythm: Normal rate and regular rhythm  Heart sounds: Normal heart sounds  No murmur heard  Pulmonary:      Effort: Pulmonary effort is normal  No respiratory distress  Breath sounds: Normal breath sounds  No wheezing or rales  Abdominal:      General: Bowel sounds are normal  There is no distension  Palpations: Abdomen is soft  Tenderness: There is no abdominal tenderness  There is no guarding or rebound  Musculoskeletal:         General: No deformity  Normal range of motion  Cervical back: Normal range of motion and neck supple  Lymphadenopathy:      Cervical: No cervical adenopathy  Skin:     Capillary Refill: Capillary refill takes less than 2 seconds  Findings: No erythema or rash  Neurological:      Mental Status: She is alert  Cranial Nerves: No cranial nerve deficit  Motor: No abnormal muscle tone        Coordination: Coordination normal       Comments: Alert, interactive, baseline dementia   Psychiatric:         Behavior: Behavior normal          Vital Signs  ED Triage Vitals   Temperature Pulse Respirations Blood Pressure SpO2   04/15/22 1945 04/15/22 1939 04/15/22 1939 04/15/22 1939 04/15/22 1939   98 °F (36 7 °C) 93 22 116/69 97 %      Temp Source Heart Rate Source Patient Position - Orthostatic VS BP Location FiO2 (%)   04/15/22 1945 04/15/22 1939 -- 04/15/22 1939 -- Oral Monitor  Right arm       Pain Score       04/15/22 1945       3           Vitals:    04/15/22 1939   BP: 116/69   Pulse: 93         Visual Acuity  Visual Acuity      Most Recent Value   L Pupil Size (mm) 3   R Pupil Size (mm) 3          ED Medications  Medications   tetanus-diphtheria-acellular pertussis (BOOSTRIX) IM injection 0 5 mL (0 5 mL Intramuscular Given 4/15/22 2047)   lidocaine-epinephrine (XYLOCAINE/EPINEPHRINE) 1 %-1:100,000 injection 5 mL (5 mL Infiltration Given by Other 4/15/22 2048)       Diagnostic Studies  Results Reviewed     None                 CT head without contrast   Final Result by Beatrice Stevens MD (04/15 2136)         1  No acute intracranial hemorrhage, mass effect or extra-axial collection  2   Right occipitomastoid scalp swelling and laceration  No acute calvarial fracture  Workstation performed: [id number]         CT spine cervical without contrast   Final Result by Beatrice Stevens MD (04/15 2133)      No acute cervical spine fracture or traumatic malalignment                     Workstation performed: [id number]                    Procedures  Laceration repair    Date/Time: 4/15/2022 10:29 PM  Performed by: Peter West MD  Authorized by: Peter West MD   Laceration length: 2 cm  Tendon involvement: none  Nerve involvement: none  Vascular damage: no  Anesthesia: local infiltration    Anesthesia:  Local Anesthetic: lidocaine 1% with epinephrine  Anesthetic total: 2 mL    Sedation:  Patient sedated: no      Wound Dehiscence:  Superficial Wound Dehiscence: simple closure      Procedure Details:  Irrigation solution: saline  Irrigation method: jet lavage  Amount of cleaning: standard  Skin closure: staples  Number of sutures: 3  Approximation: close  Approximation difficulty: simple  Patient tolerance: patient tolerated the procedure well with no immediate complications               ED Course MDM  Number of Diagnoses or Management Options  Fall, initial encounter: new and requires workup  Injury of head, initial encounter: new and requires workup  Scalp laceration, initial encounter: new and requires workup  Diagnosis management comments: Mechanical fall, CT head cervical spine with no acute traumatic injuries identified  Wound cleaned, repaired per procedure note above  Three staples, caregiver where they need to be taken out around 7 days  Tetanus shot updated in emergency department  Patient otherwise at her baseline, moving all extremities spontaneously, ambulatory and in no acute distress at time of discharge  Amount and/or Complexity of Data Reviewed  Tests in the radiology section of CPT®: ordered and reviewed  Obtain history from someone other than the patient: yes    Risk of Complications, Morbidity, and/or Mortality  Presenting problems: high  Diagnostic procedures: moderate  Management options: high    Patient Progress  Patient progress: improved      Disposition  Final diagnoses:   Fall, initial encounter   Injury of head, initial encounter   Scalp laceration, initial encounter     Time reflects when diagnosis was documented in both MDM as applicable and the Disposition within this note     Time User Action Codes Description Comment    4/15/2022 10:01 PM Tamiko Left Add [id number]  HOZN] Fall, initial encounter     4/15/2022 10:01 PM Tamiko Left Add [M07 [id number]] Injury of head, initial encounter     4/15/2022 10:02 PM Tamiko Left Add [S01 01XA] Scalp laceration, initial encounter       ED Disposition     ED Disposition Condition Date/Time Comment    Discharge Stable Fri Apr 15, 2022 10:00 PM [address] discharge to home/self care              Follow-up Information     Follow up With Specialties Details Why Contact Info Additional Information    Hailey Peterson MD Family Medicine Schedule an appointment as soon as possible for a visit in 1 day As needed [address] [facility], [place] [phone]  [facility] Emergency Department Emergency Medicine Go to  If symptoms worsen [address] [address] [facility] Emergency Department, [address], [place], [place], [address]    PCP or urgent care or ER  In 1 week For suture removal (3 staples)            Discharge Medication List as of 4/15/2022 10:02 PM      CONTINUE these medications which have NOT CHANGED    Details   ALPRAZolam (XANAX) 0 25 mg tablet Take 0 25 mg by mouth 3 (three) times a day as needed, Starting Tue 4/28/2020, Historical Med      Arnuity Ellipta 100 MCG/ACT AEPB inhaler INHALE 1 PUFF DAILY RINSE MOUTH AFTER USE , Normal      azithromycin (ZITHROMAX) 250 mg tablet TAKE 1 TABLET (250 MG TOTAL) BY MOUTH EVERY 24 HOURS, Normal      benzonatate (TESSALON) 200 MG capsule Take 1 capsule (200 mg total) by mouth 3 (three) times a day as needed for cough, Starting Wed 9/22/2021, Normal      busPIRone (BUSPAR) 7 5 mg tablet Starting Fri 7/5/2019, Historical Med      Cholecalciferol (VITAMIN D3 PO) Take by mouth, Historical Med      Coenzyme Q10 (COQ-10 PO) Take 200 mg by mouth daily  , Historical Med      docusate sodium (COLACE) 100 mg capsule Take 100 mg by mouth daily, Historical Med      dorzolamide-timolol (COSOPT) 22 3-6 8 MG/ML ophthalmic solution INSTILL 1 DROP INTO THE LEFT EYE THREE TIMES DAILY, Historical Med      escitalopram (LEXAPRO) 10 mg tablet Starting Wed 9/15/2021, Historical Med      ethambutol (MYAMBUTOL) 400 mg tablet TAKE 2 TABLETS (800 MG TOTAL) BY MOUTH DAILY, Starting Wed 12/15/2021, Until Tue 3/15/2022, Normal      fluticasone (FLONASE) 50 mcg/act nasal spray 1 spray into each nostril daily at bedtime, Historical Med      guaiFENesin (MUCINEX) 600 mg 12 hr tablet Take 600 mg by mouth every 12 (twelve) hours, Historical Med      ipratropium (ATROVENT) 0 02 % nebulizer solution Take 2 5 mL (0 5 mg total) by nebulization 2 (two) times a day, Starting Fri 11/19/2021, Until Thu 2/17/2022, Normal      lidocaine (LIDODERM) 5 % Apply 1 patch topically daily Remove & Discard patch within 12 hours or as directed by MD, Starting Mon 10/28/2019, Normal      LUMIGAN 0 01 % ophthalmic drops 1 DROP INTO BOTH EYES AT BEDTIME, Historical Med      magnesium 30 MG tablet Take 30 mg by mouth 2 (two) times a day, Historical Med      memantine (NAMENDA) 10 mg tablet Starting Sat 12/11/2021, Historical Med      Myrbetriq 50 MG TB24 Take 1 tablet by mouth daily, Starting Tue 10/27/2020, Historical Med      Prolensa 0 07 % SOLN Starting Sun 7/25/2021, Historical Med      rifampin (RIFADIN) 300 mg capsule TAKE 2 CAPSULES BY MOUTH DAILY , Normal      timolol (TIMOPTIC-XE) 0 25 % ophthalmic gel-forming INSTILL 1 DROP INTO LEFT EYE EVERY MORNING, Historical Med      Toviaz 8 MG TB24 Take 1 tablet by mouth daily, Starting Mon 7/19/2021, Historical Med      TURMERIC PO Take by mouth, Historical Med      zinc gluconate 50 mg tablet Take 50 mg by mouth daily, Historical Med             No discharge procedures on file      PDMP Review     None          ED Provider  Electronically Signed by           Jayleen Hawkins MD  04/15/22 7608

## 2022-05-12 ENCOUNTER — APPOINTMENT (OUTPATIENT)
Dept: LAB | Facility: CLINIC | Age: 82
End: 2022-05-12
Payer: MEDICARE

## 2022-05-12 DIAGNOSIS — N39.0 URINARY TRACT INFECTION WITHOUT HEMATURIA, SITE UNSPECIFIED: ICD-10-CM

## 2022-05-12 PROCEDURE — 87086 URINE CULTURE/COLONY COUNT: CPT

## 2022-05-13 LAB — BACTERIA UR CULT: NORMAL

## 2022-06-17 DIAGNOSIS — A31.0 MYCOBACTERIUM AVIUM COMPLEX (HCC): ICD-10-CM

## 2022-06-20 DIAGNOSIS — A31.0 MYCOBACTERIUM AVIUM COMPLEX (HCC): ICD-10-CM

## 2022-06-20 NOTE — TELEPHONE ENCOUNTER
Pat patient's care taker called requesting enough rifampin to get them through to the visit on 06/28  I advised her that this is what the visit it about her medications

## 2022-06-22 NOTE — TELEPHONE ENCOUNTER
Called and spoke to Roberto Vega the patient's care taker, they already picked up the medication last night at 9 pm

## 2022-06-24 DIAGNOSIS — R05.9 COUGH: ICD-10-CM

## 2022-06-24 DIAGNOSIS — A31.0 PULMONARY MYCOBACTERIAL INFECTION (HCC): ICD-10-CM

## 2022-06-24 DIAGNOSIS — J98.4 CAVITARY LESION OF LUNG: ICD-10-CM

## 2022-06-24 RX ORDER — AZITHROMYCIN 250 MG/1
TABLET, FILM COATED ORAL
Qty: 30 TABLET | Refills: 2 | Status: SHIPPED | OUTPATIENT
Start: 2022-06-24 | End: 2022-09-22

## 2022-06-24 NOTE — TELEPHONE ENCOUNTER
Pts caregiver Donny Carolina calling saying Lolis Faulkner needs her azithromycin sent in  She did not know this medication was not automatically refilled  Donny Carolina states she only has enough until Sunday   Please send to [facility]

## 2022-06-28 ENCOUNTER — OFFICE VISIT (OUTPATIENT)
Dept: PULMONOLOGY | Facility: CLINIC | Age: 82
End: 2022-06-28
Payer: MEDICARE

## 2022-06-28 VITALS
OXYGEN SATURATION: 96 % | TEMPERATURE: 98.5 F | HEART RATE: 95 BPM | HEIGHT: 62 IN | BODY MASS INDEX: 19.54 KG/M2 | SYSTOLIC BLOOD PRESSURE: 112 MMHG | RESPIRATION RATE: 18 BRPM | DIASTOLIC BLOOD PRESSURE: 70 MMHG | WEIGHT: 106.2 LBS

## 2022-06-28 DIAGNOSIS — A31.0 PULMONARY MYCOBACTERIAL INFECTION (HCC): Primary | ICD-10-CM

## 2022-06-28 PROCEDURE — 99213 OFFICE O/P EST LOW 20 MIN: CPT | Performed by: INTERNAL MEDICINE

## 2022-06-28 NOTE — PROGRESS NOTES
Progress Note - Pulmonary   aDniel Henderson [de-identified] y o  female MRN: [id number]   Encounter: [id number]      Assessment/Plan:  Patient is an 47-year-old female with past medical history significant for cavitary FIDE who presents for routine follow-up  Overall since last visit, patient is doing well  She has no specific complaints  The patient a mild cough which improved with grapefruit juice  Discussed extensively the patient and her caregiver the continue treatment for MAC  She has been on this for nearly 18 months if not longer  Patient is overall tolerating this well  At this time, will recheck CBC, CMP  Will also check FIDE sputum culture  If sputum culture is negative, may consider discontinuing of triple therapy  1  P[facility]obacter[facility] ([facility] )  -     CBC and differential; Future  -     Comprehensive metabolic panel; Future  -     AFB Culture and Stain; Future      Patient may follow up in 4 months or sooner as necessary  Orders:  Orders Placed This Encounter   Procedures    AFB Culture and Stain     Standing Status:   Future     Standing Expiration Date:   6/28/2023     Order Specific Question:   Release to patient through [facility]     Answer:   Immediate    CBC and differential     This is a patient instruction: This test is non-fasting  Please drink two glasses of water morning of bloodwork  Standing Status:   Future     Standing Expiration Date:   6/28/2023    Comprehensive metabolic panel     This is a patient instruction: Patient fasting for 8 hours or longer recommended  Standing Status:   Future     Standing Expiration Date:   6/28/2023       Subjective:   Patient is doing well since last visit  She has no specific complaints  The patient's caretaker is accompanying the patient today  She reports that the patient did have a mild cough which improved with pineapple juice  She drinks minimal water but does have adequate urine output    She denies fevers, chills, nausea or vomiting  Inhaler Regimen:  No recent nebulizers    Remainder of review of systems negative except as described in HPI  The following portions of the patient's history were reviewed and updated as appropriate: allergies, current medications, past family history, past medical history, past social history, past surgical history and problem list      Objective:   Vitals: Blood pressure 112/70, pulse 95, temperature 98 5 °F (36 9 °C), temperature source Tympanic, resp  rate 18, height 5' 2" (1 575 m), weight 48 2 kg (106 lb 3 2 oz), SpO2 96 % , RA, Body mass index is 19 42 kg/m²  Physical Exam  Gen: Pleasant, awake, alert, oriented x 3, no acute distress  HEENT: Mucous membranes moist, no oral lesions, no thrush  NECK: No accessory muscle use, JVP not elevated  Cardiac: RRR, single S1, single S2, no murmurs, no rubs, no gallops  Lungs: slightly decreased on right side  Abdomen: normoactive bowel sounds, soft nontender, nondistended, no rebound or rigidity, no guarding, thin  Extremities: no cyanosis, no clubbing, no LE edema  MSK:  Strength equal in all extremities  Derm:  No rashes/lesions noted  Neuro:  Appropriate mood/affect    Labs: I have personally reviewed pertinent lab results  Lab Results   Component Value Date    WBC 14 28 (H) 09/25/2020    HGB 11 9 09/25/2020    HGB 15 0 05/24/2014     (H) 09/25/2020     Lab Results   Component Value Date    CREATININE 0 49 (L) 11/12/2020        Imaging and other studies: I have personally reviewed pertinent reports  and I have personally reviewed pertinent films in PACS  CT cervical spine 4/2022  My interpretation:  Scarring noted in right apex    Pulmonary Function Testing:   No pulmonary function testing available for review    Treasure Santana

## 2022-07-13 ENCOUNTER — TELEPHONE (OUTPATIENT)
Dept: PULMONOLOGY | Facility: MEDICAL CENTER | Age: 82
End: 2022-07-13

## 2022-07-13 NOTE — TELEPHONE ENCOUNTER
Pt's caregiver called and lm to advise it is impossible to get [facility] to fill cup up with sputum   Please advise

## 2022-07-14 ENCOUNTER — APPOINTMENT (OUTPATIENT)
Dept: LAB | Facility: CLINIC | Age: 82
End: 2022-07-14
Payer: MEDICARE

## 2022-07-14 DIAGNOSIS — A31.0 PULMONARY MYCOBACTERIAL INFECTION (HCC): ICD-10-CM

## 2022-07-14 LAB
ALBUMIN SERPL BCP-MCNC: 4 G/DL (ref 3.5–5)
ALP SERPL-CCNC: 71 U/L (ref 34–104)
ALT SERPL W P-5'-P-CCNC: 8 U/L (ref 7–52)
ANION GAP SERPL CALCULATED.3IONS-SCNC: 5 MMOL/L (ref 4–13)
AST SERPL W P-5'-P-CCNC: 17 U/L (ref 13–39)
BASOPHILS # BLD MANUAL: 0 THOUSAND/UL (ref 0–0.1)
BASOPHILS NFR MAR MANUAL: 0 % (ref 0–1)
BILIRUB SERPL-MCNC: 0.59 MG/DL (ref 0.2–1)
BUN SERPL-MCNC: 12 MG/DL (ref 5–25)
CALCIUM SERPL-MCNC: 9.9 MG/DL (ref 8.4–10.2)
CHLORIDE SERPL-SCNC: 104 MMOL/L (ref 96–108)
CO2 SERPL-SCNC: 28 MMOL/L (ref 21–32)
CREAT SERPL-MCNC: 0.52 MG/DL (ref 0.6–1.3)
EOSINOPHIL # BLD MANUAL: 0 THOUSAND/UL (ref 0–0.4)
EOSINOPHIL NFR BLD MANUAL: 0 % (ref 0–6)
ERYTHROCYTE [DISTWIDTH] IN BLOOD BY AUTOMATED COUNT: 13 % (ref 11.6–15.1)
GFR SERPL CREATININE-BSD FRML MDRD: 90 ML/MIN/1.73SQ M
GLUCOSE P FAST SERPL-MCNC: 83 MG/DL (ref 65–99)
HCT VFR BLD AUTO: 40.9 % (ref 34.8–46.1)
HGB BLD-MCNC: 13 G/DL (ref 11.5–15.4)
LYMPHOCYTES # BLD AUTO: 12.09 THOUSAND/UL (ref 0.6–4.47)
LYMPHOCYTES # BLD AUTO: 67 % (ref 14–44)
MCH RBC QN AUTO: 32 PG (ref 26.8–34.3)
MCHC RBC AUTO-ENTMCNC: 31.8 G/DL (ref 31.4–37.4)
MCV RBC AUTO: 101 FL (ref 82–98)
METAMYELOCYTES NFR BLD MANUAL: 1 % (ref 0–1)
MONOCYTES # BLD AUTO: 0.54 THOUSAND/UL (ref 0–1.22)
MONOCYTES NFR BLD: 3 % (ref 4–12)
NEUTROPHILS # BLD MANUAL: 5.23 THOUSAND/UL (ref 1.85–7.62)
NEUTS BAND NFR BLD MANUAL: 1 % (ref 0–8)
NEUTS SEG NFR BLD AUTO: 28 % (ref 43–75)
PLATELET # BLD AUTO: 293 THOUSANDS/UL (ref 149–390)
PLATELET BLD QL SMEAR: ADEQUATE
PMV BLD AUTO: 10.5 FL (ref 8.9–12.7)
POTASSIUM SERPL-SCNC: 4.1 MMOL/L (ref 3.5–5.3)
PROT SERPL-MCNC: 7 G/DL (ref 6.4–8.4)
RBC # BLD AUTO: 4.06 MILLION/UL (ref 3.81–5.12)
RBC MORPH BLD: NORMAL
SODIUM SERPL-SCNC: 137 MMOL/L (ref 135–147)
WBC # BLD AUTO: 18.05 THOUSAND/UL (ref 4.31–10.16)

## 2022-07-14 PROCEDURE — 80053 COMPREHEN METABOLIC PANEL: CPT

## 2022-07-14 PROCEDURE — 87206 SMEAR FLUORESCENT/ACID STAI: CPT

## 2022-07-14 PROCEDURE — 87118 MYCOBACTERIC IDENTIFICATION: CPT

## 2022-07-14 PROCEDURE — 85027 COMPLETE CBC AUTOMATED: CPT

## 2022-07-14 PROCEDURE — 87116 MYCOBACTERIA CULTURE: CPT

## 2022-07-14 PROCEDURE — 36415 COLL VENOUS BLD VENIPUNCTURE: CPT

## 2022-07-14 PROCEDURE — 85007 BL SMEAR W/DIFF WBC COUNT: CPT

## 2022-07-15 ENCOUNTER — TELEPHONE (OUTPATIENT)
Dept: PULMONOLOGY | Facility: CLINIC | Age: 82
End: 2022-07-15

## 2022-07-22 ENCOUNTER — TELEPHONE (OUTPATIENT)
Dept: PULMONOLOGY | Facility: CLINIC | Age: 82
End: 2022-07-22

## 2022-07-22 NOTE — TELEPHONE ENCOUNTER
Left voicemail both on her home phone and cell phone to continue triple therapy and have her PCP follow-up her CBC

## 2022-07-22 NOTE — TELEPHONE ENCOUNTER
----- Message from Dotty Kumar MD sent at 7/21/2022 11:58 AM EDT -----  I have reviewed the results of your blood work  There continues to be growth of the mycobacterium in the culture therefore recommend continuing the triple therapy  Of note, the lymphocyte percentage continues to increase as well as the white blood cell count  I would encourage you to follow-up with her primary care and possibly Hematology for further evaluation

## 2022-07-22 NOTE — TELEPHONE ENCOUNTER
Meme Lopez from the [employer] [facility] lab called stating Pt has a positive AFB they are sending out to identify

## 2022-09-08 DIAGNOSIS — A31.0 MYCOBACTERIUM AVIUM COMPLEX (HCC): ICD-10-CM

## 2022-09-08 DIAGNOSIS — A31.0 PULMONARY MYCOBACTERIAL INFECTION (HCC): ICD-10-CM

## 2022-09-08 RX ORDER — AZITHROMYCIN 250 MG/1
TABLET, FILM COATED ORAL
Qty: 30 TABLET | Refills: 2 | Status: SHIPPED | OUTPATIENT
Start: 2022-09-08 | End: 2022-12-07

## 2022-10-04 ENCOUNTER — TELEPHONE (OUTPATIENT)
Dept: PULMONOLOGY | Facility: CLINIC | Age: 82
End: 2022-10-04

## 2022-10-17 ENCOUNTER — TELEPHONE (OUTPATIENT)
Dept: OBGYN CLINIC | Facility: HOSPITAL | Age: 82
End: 2022-10-17

## 2022-10-17 NOTE — TELEPHONE ENCOUNTER
Caller: patient caregiver Suhailphilip Zion  [address]    Reason for call: fu bilat hands req cortisone Medicare     Call back#: [phone]   Adrian Donovan

## 2022-10-20 ENCOUNTER — APPOINTMENT (OUTPATIENT)
Dept: LAB | Facility: CLINIC | Age: 82
End: 2022-10-20
Payer: MEDICARE

## 2022-10-20 DIAGNOSIS — N39.0 URINARY TRACT INFECTION WITHOUT HEMATURIA, SITE UNSPECIFIED: ICD-10-CM

## 2022-10-20 PROCEDURE — 87086 URINE CULTURE/COLONY COUNT: CPT

## 2022-10-21 LAB — BACTERIA UR CULT: NORMAL

## 2022-11-04 ENCOUNTER — OFFICE VISIT (OUTPATIENT)
Dept: OBGYN CLINIC | Facility: HOSPITAL | Age: 82
End: 2022-11-04

## 2022-11-04 VITALS
WEIGHT: 106 LBS | BODY MASS INDEX: 19.51 KG/M2 | HEIGHT: 62 IN | DIASTOLIC BLOOD PRESSURE: 78 MMHG | SYSTOLIC BLOOD PRESSURE: 121 MMHG | HEART RATE: 81 BPM

## 2022-11-04 DIAGNOSIS — M18.0 PRIMARY OSTEOARTHRITIS OF BOTH FIRST CARPOMETACARPAL JOINTS: Primary | ICD-10-CM

## 2022-11-04 RX ORDER — LIDOCAINE HYDROCHLORIDE 10 MG/ML
1 INJECTION, SOLUTION INFILTRATION; PERINEURAL
Status: COMPLETED | OUTPATIENT
Start: 2022-11-04 | End: 2022-11-04

## 2022-11-04 RX ORDER — BETAMETHASONE SODIUM PHOSPHATE AND BETAMETHASONE ACETATE 3; 3 MG/ML; MG/ML
6 INJECTION, SUSPENSION INTRA-ARTICULAR; INTRALESIONAL; INTRAMUSCULAR; SOFT TISSUE
Status: COMPLETED | OUTPATIENT
Start: 2022-11-04 | End: 2022-11-04

## 2022-11-04 RX ADMIN — BETAMETHASONE SODIUM PHOSPHATE AND BETAMETHASONE ACETATE 6 MG: 3; 3 INJECTION, SUSPENSION INTRA-ARTICULAR; INTRALESIONAL; INTRAMUSCULAR; SOFT TISSUE at 12:01

## 2022-11-04 RX ADMIN — LIDOCAINE HYDROCHLORIDE 1 ML: 10 INJECTION, SOLUTION INFILTRATION; PERINEURAL at 12:01

## 2022-11-04 NOTE — PROGRESS NOTES
ASSESSMENT/PLAN:    Assessment:   Bilateral thumb CMC arthritis    Plan:   Patient was given bilateral thumb CMC cortisone injections - betamethasone  She tolerated well  She was advised we can repeat the cortisone injections every 3-4 months as needed for pain   She will follow-up with us in 3-4 months for re-evaluation    Follow Up:  3-4 months    To Do Next Visit:  Re-evaluation possible repeat cortisone injections      _____________________________________________________  CHIEF COMPLAINT:  Chief Complaint   Patient presents with   • Left Thumb - Follow-up     CMC- Beta    • Right Thumb - Follow-up     CMC- Beta          SUBJECTIVE:  Allan Sigala is a [de-identified] y o  female who presents for follow up regarding bilateral thumb CMC arthritis  She was previously given a cortisone injection on 06/25/2021  She states that the injections started to wear off over the last few months  She would like to try repeat cortisone injections for this issue  PAST MEDICAL HISTORY:  Past Medical History:   Diagnosis Date   • Lung nodule        PAST SURGICAL HISTORY:  Past Surgical History:   Procedure Laterality Date   • [place] BRONCHOSCOPY,DIAGNOSTIC Right 7/16/2018    Procedure: Huber Booker;  Surgeon: Lavern Martin MD;  [facility]; Service: Pulmonary   • [place] BRONCHOSCOPY,DIAGNOSTIC N/A 12/17/2018    Procedure: Huber Booker;  Surgeon: Lavern Martin MD;  [facility]; Service: Pulmonary       FAMILY HISTORY:  Family History   Problem Relation Age of Onset   • Dementia Mother    • Hypertension Father    • No Known Problems Daughter    • No Known Problems Maternal Grandmother    • No Known Problems Maternal Grandfather    • No Known Problems Daughter        SOCIAL HISTORY:  Social History     Tobacco Use   • Smoking status: Never Smoker   • Smokeless tobacco: Never Used   Vaping Use   • Vaping Use: Never used   Substance Use Topics   • Alcohol use: No   • Drug use:  No MEDICATIONS:    Current Outpatient Medications:   •  ALPRAZolam (XANAX) 0 25 mg tablet, Take 0 25 mg by mouth 3 (three) times a day as needed, Disp: , Rfl:   •  Arnuity Ellipta 100 MCG/ACT AEPB inhaler, INHALE 1 PUFF DAILY RINSE MOUTH AFTER USE , Disp: 30 blister, Rfl: 5  •  azithromycin (ZITHROMAX) 250 mg tablet, TAKE 1 TABLET (250 MG TOTAL) BY MOUTH EVERY 24 HOURS, Disp: 30 tablet, Rfl: 2  •  benzonatate (TESSALON) 200 MG capsule, Take 1 capsule (200 mg total) by mouth 3 (three) times a day as needed for cough, Disp: 90 capsule, Rfl: 1  •  busPIRone (BUSPAR) 7 5 mg tablet, , Disp: , Rfl: 0  •  Cholecalciferol (VITAMIN D3 PO), Take by mouth, Disp: , Rfl:   •  Coenzyme Q10 (COQ-10 PO), Take 200 mg by mouth daily  , Disp: , Rfl:   •  docusate sodium (COLACE) 100 mg capsule, Take 100 mg by mouth daily, Disp: , Rfl:   •  dorzolamide-timolol (COSOPT) 22 3-6 8 MG/ML ophthalmic solution, INSTILL 1 DROP INTO THE LEFT EYE THREE TIMES DAILY, Disp: , Rfl:   •  escitalopram (LEXAPRO) 10 mg tablet, , Disp: , Rfl:   •  ethambutol (MYAMBUTOL) 400 mg tablet, TAKE 2 TABLETS (800 MG TOTAL) BY MOUTH DAILY, Disp: 180 tablet, Rfl: 3  •  fluticasone (FLONASE) 50 mcg/act nasal spray, 1 spray into each nostril daily at bedtime, Disp: , Rfl:   •  guaiFENesin (MUCINEX) 600 mg 12 hr tablet, Take 600 mg by mouth every 12 (twelve) hours, Disp: , Rfl:   •  ipratropium (ATROVENT) 0 02 % nebulizer solution, Take 2 5 mL (0 5 mg total) by nebulization 2 (two) times a day, Disp: 150 mL, Rfl: 2  •  lidocaine (LIDODERM) 5 %, Apply 1 patch topically daily Remove & Discard patch within 12 hours or as directed by MD, Disp: 30 patch, Rfl: 0  •  LUMIGAN 0 01 % ophthalmic drops, 1 DROP INTO BOTH EYES AT BEDTIME, Disp: , Rfl: 4  •  magnesium 30 MG tablet, Take 30 mg by mouth 2 (two) times a day, Disp: , Rfl:   •  memantine (NAMENDA) 10 mg tablet, , Disp: , Rfl:   •  memantine (NAMENDA) 5 mg tablet, Take 5 mg by mouth 2 (two) times a day, Disp: , Rfl: •  Myrbetriq 50 MG TB24, Take 1 tablet by mouth daily, Disp: , Rfl:   •  Prolensa 0 07 % SOLN, , Disp: , Rfl:   •  rifampin (RIFADIN) 300 mg capsule, Take 2 capsules (600 mg total) by mouth daily, Disp: 60 capsule, Rfl: 2  •  rifampin (RIFADIN) 300 mg capsule, TAKE 2 CAPSULES BY MOUTH EVERY DAY, Disp: 60 capsule, Rfl: 2  •  timolol (TIMOPTIC-XE) 0 25 % ophthalmic gel-forming, INSTILL 1 DROP INTO LEFT EYE EVERY MORNING, Disp: , Rfl: 4  •  Toviaz 8 MG TB24, Take 1 tablet by mouth daily, Disp: , Rfl:   •  TURMERIC PO, Take by mouth, Disp: , Rfl:   •  zinc gluconate 50 mg tablet, Take 50 mg by mouth daily, Disp: , Rfl:     ALLERGIES:  No Known Allergies    REVIEW OF SYSTEMS:  Pertinent items are noted in HPI  A comprehensive review of systems was negative      LABS:  HgA1c: No results found for: HGBA1C  BMP:   Lab Results   Component Value Date    CALCIUM 9 9 07/14/2022    K 4 1 07/14/2022    CO2 28 07/14/2022     07/14/2022    BUN 12 07/14/2022    CREATININE 0 52 (L) 07/14/2022           _____________________________________________________  PHYSICAL EXAMINATION:  Vital signs: /78   Pulse 81   Ht 5' 2" (1 575 m)   Wt 48 1 kg (106 lb)   BMI 19 39 kg/m²   General: well developed and well nourished, alert, oriented times 3 and appears comfortable  Psychiatric: Normal  HEENT: Trachea Midline, No torticollis  Cardiovascular: No discernable arrhythmia  Pulmonary: No wheezing or stridor  Abdomen: No rebound or guarding  Extremities: No peripheral edema  Skin: No masses, erythema, lacerations, fluctation, ulcerations  Neurovascular: Sensation Intact to the Median, Ulnar, Radial Nerve, Motor Intact to the Median, Ulnar, Radial Nerve and Pulses Intact    MUSCULOSKELETAL EXAMINATION:  Bilateral thumb CMC Exam:  No adduction contracture  No hyperextension deformity of MCP joint  Positive localized tenderness over radial and dorsal aspect of thumb (CMC joint)  Grind test is Positive for pain and Positive for crepitus  No triggering or tenderness over the A1 pulley  No pain with Finkelstein’s maneuver     _____________________________________________________  STUDIES REVIEWED:  No Studies to review      PROCEDURES PERFORMED:  Small joint arthrocentesis: R thumb CMC  [place] Protocol:  Consent: Verbal consent obtained  Risks and benefits: risks, benefits and alternatives were discussed  Consent given by: patient  Time out: Immediately prior to procedure a "time out" was called to verify the correct patient, procedure, equipment, support staff and site/side marked as required  Patient understanding: patient states understanding of the procedure being performed  Patient consent: the patient's understanding of the procedure matches consent given  Site marked: the operative site was marked  Patient identity confirmed: verbally with patient    Supporting Documentation  Indications: pain   Procedure Details  Location: thumb - R thumb CMC  Preparation: Patient was prepped and draped in the usual sterile fashion  Needle size: 25 G  Approach: dorsal  Medications administered: 6 mg betamethasone acetate-betamethasone sodium phosphate 6 (3-3) mg/mL; 1 mL lidocaine 1 %    Patient tolerance: patient tolerated the procedure well with no immediate complications  Dressing:  Sterile dressing applied    Small joint arthrocentesis: L thumb CMC  [place] Protocol:  Consent: Verbal consent obtained  Risks and benefits: risks, benefits and alternatives were discussed  Consent given by: patient  Time out: Immediately prior to procedure a "time out" was called to verify the correct patient, procedure, equipment, support staff and site/side marked as required    Patient understanding: patient states understanding of the procedure being performed  Patient consent: the patient's understanding of the procedure matches consent given  Site marked: the operative site was marked  Patient identity confirmed: verbally with patient    Supporting Documentation  Indications: pain   Procedure Details  Location: thumb - L thumb CMC  Preparation: Patient was prepped and draped in the usual sterile fashion  Needle size: 25 G  Approach: dorsal  Medications administered: 1 mL lidocaine 1 %; 6 mg betamethasone acetate-betamethasone sodium phosphate 6 (3-3) mg/mL    Patient tolerance: patient tolerated the procedure well with no immediate complications  Dressing:  Sterile dressing applied

## 2022-12-06 ENCOUNTER — OFFICE VISIT (OUTPATIENT)
Dept: PULMONOLOGY | Facility: CLINIC | Age: 82
End: 2022-12-06

## 2022-12-06 VITALS
HEIGHT: 62 IN | BODY MASS INDEX: 19.51 KG/M2 | WEIGHT: 106 LBS | DIASTOLIC BLOOD PRESSURE: 78 MMHG | HEART RATE: 68 BPM | RESPIRATION RATE: 16 BRPM | TEMPERATURE: 97.7 F | OXYGEN SATURATION: 97 % | SYSTOLIC BLOOD PRESSURE: 112 MMHG

## 2022-12-06 DIAGNOSIS — J98.4 CAVITARY LESION OF LUNG: ICD-10-CM

## 2022-12-06 DIAGNOSIS — A31.0 MYCOBACTERIUM AVIUM COMPLEX (HCC): Primary | ICD-10-CM

## 2022-12-06 DIAGNOSIS — D72.820 LYMPHOCYTOSIS: ICD-10-CM

## 2022-12-06 RX ORDER — SODIUM FLUORIDE 6 MG/ML
PASTE, DENTIFRICE DENTAL
COMMUNITY
Start: 2022-11-28

## 2022-12-06 NOTE — PROGRESS NOTES
Progress Note - Pulmonary   Willie Bowman [de-identified] y o  female MRN: [id number]   Encounter: [id number]      Assessment/Plan:  Patient is an 61-year-old female with past medical history significant for Mycobacterium avium complex who presents for routine follow-up  Since the last visit, the patient's  has unfortunately passed away  The patient is doing remarkably well  She is going out to eat and shopping with her caretaker  She has a mild cough which is possibly related to the HVAC unit for which her son will have evaluated  The patient has gained a mild amount of weight and appears healthy at this time  She is tolerating her triple therapy for the MAC relatively well  Of note, there is a lymphocytosis of unclear etiology  We will recheck CBC with differential as well as CMP  If there is a significant abnormality, may consider hematology referral     Lymphocytosis  -  Repeat CBC w/ differential    MAC  -  Recently positive  -  Continue triple therapy   -  Will plan for indefinite given recent positive testing in setting of cavitary lung disease    1  Mycobacterium avium complex ([facility] [address] )  -     Comprehensive metabolic panel; Future  -     CBC and differential; Future    2  Lymphocytosis  -     CBC and differential; Future    3  Cavitary lesion of lung      Patient may follow up in 3-4 months or sooner as necessary  Orders:  Orders Placed This Encounter   Procedures   • Comprehensive metabolic panel     This is a patient instruction: Patient fasting for 8 hours or longer recommended  Standing Status:   Future     Standing Expiration Date:   12/6/2023   • CBC and differential     This is a patient instruction: This test is non-fasting  Please drink two glasses of water morning of bloodwork  Standing Status:   Future     Standing Expiration Date:   12/6/2023       Subjective:  Pt reports she is doing relatively okay  Her cough has slightly worsened recently    The cough started about 1 day prior to the visit  She took some pineapple juice for cough  The patient is moving to near the [address] [place] [address]  The patient denies any fevers, chills, nausea or vomting  She is eating well  She is out shopping with her caretaker  Majority of history provided by caretaker  Inhaler Regimen:  None    Remainder of review of systems negative except as described in HPI  The following portions of the patient's history were reviewed and updated as appropriate: allergies, current medications, past family history, past medical history, past social history, past surgical history and problem list      Objective:   Vitals: Blood pressure 112/78, pulse 68, temperature 97 7 °F (36 5 °C), temperature source Tympanic, resp  rate 16, height 5' 2" (1 575 m), weight 48 1 kg (106 lb), SpO2 97 % , RA, Body mass index is 19 39 kg/m²  Physical Exam  Gen: Pleasant, awake, alert, oriented x 3, no acute distress  HEENT: Mucous membranes moist, no oral lesions, no thrush  NECK: No accessory muscle use, JVP not elevated  Cardiac: RRR, single S1, single S2, no murmurs, no rubs, no gallops  Lungs: decreased breath sounds  Abdomen: normoactive bowel sounds, soft nontender, nondistended, no rebound or rigidity, no guarding, thin  Extremities: no cyanosis, no clubbing, no LE edema  MSK:  Strength equal in all extremities  Derm:  No rashes/lesions noted  Neuro:  Appropriate mood/affect    Labs: I have personally reviewed pertinent lab results  Lab Results   Component Value Date    WBC 18 05 (H) 07/14/2022    HGB 13 0 07/14/2022    HGB 15 0 05/24/2014     07/14/2022     Lab Results   Component Value Date    CREATININE 0 52 (L) 07/14/2022     Imaging and other studies: I have personally reviewed pertinent reports     and I have personally reviewed pertinent films in PACS  CT Chest 9/8/2020  My interpretation:  Cavitary lesion    Radiology findings:  LUNGS:  There has been continued increase in the cavitary consolidation in the posterior right upper lobe and now with confluent cavitary consolidation throughout the completely collapsed right middle lobe  A large confluent cavity in the posterior   segment right upper lobe measures 6 4 x 5 6 x 6 7 1 cm on image 28 of series 3 and image 113 of series 602 as compared with 7 2 x 5 1 x 5 5 cm when measured using similar technique on October 8, 2018  Cavitary consolidation completely encompasses the right middle lobe which represents marked progression from prior examination  There are new and enlarging  A representative lesion measures 1 9 cm in the lateral right lower lobe on image 73 of series 3, either small or not present on October 8, 2018  Patchy nodular infiltrates in the left lung are similar from prior examination  Bronchiectatic changes are reidentified  Tristen Velasco PLEURA:  Consolidative cavitary change in the posterior right upper lobe and in the right middle lobe is inseparable from pleural surface  No pleural mass or pleural effusion  HEART/GREAT VESSELS:  Normal heart size  Coronary artery calcifications noted  Normal caliber thoracic aorta with minimal atherosclerotic calcifications  Heart and mediastinal contents are shifted towards the right due to loss of volume as a result of   confluent cavities and posterior right upper lobe and right middle lobe  MEDIASTINUM AND CECI:  Unchanged borderline right paratracheal node, 7 mm in short axis  Pulmonary Function Testing:   No pulmonary function testing available for review  Cass Hannon [facility]

## 2022-12-10 DIAGNOSIS — A31.0 MYCOBACTERIUM AVIUM COMPLEX (HCC): ICD-10-CM

## 2022-12-10 DIAGNOSIS — A31.0 PULMONARY MYCOBACTERIAL INFECTION (HCC): ICD-10-CM

## 2022-12-12 RX ORDER — AZITHROMYCIN 250 MG/1
TABLET, FILM COATED ORAL
Qty: 30 TABLET | Refills: 2 | Status: SHIPPED | OUTPATIENT
Start: 2022-12-12 | End: 2023-03-12

## 2022-12-12 RX ORDER — ETHAMBUTOL HYDROCHLORIDE 400 MG/1
TABLET, FILM COATED ORAL
Qty: 180 TABLET | Refills: 3 | Status: SHIPPED | OUTPATIENT
Start: 2022-12-12 | End: 2023-03-12

## 2022-12-17 ENCOUNTER — APPOINTMENT (OUTPATIENT)
Dept: LAB | Facility: CLINIC | Age: 82
End: 2022-12-17

## 2022-12-17 DIAGNOSIS — A31.0 MYCOBACTERIUM AVIUM COMPLEX (HCC): ICD-10-CM

## 2022-12-17 DIAGNOSIS — D72.820 LYMPHOCYTOSIS: ICD-10-CM

## 2022-12-17 LAB
ALBUMIN SERPL BCP-MCNC: 3.8 G/DL (ref 3.5–5)
ALP SERPL-CCNC: 69 U/L (ref 34–104)
ALT SERPL W P-5'-P-CCNC: 8 U/L (ref 7–52)
ANION GAP SERPL CALCULATED.3IONS-SCNC: 6 MMOL/L (ref 4–13)
AST SERPL W P-5'-P-CCNC: 15 U/L (ref 13–39)
BASOPHILS # BLD AUTO: 0.05 THOUSANDS/ÂΜL (ref 0–0.1)
BASOPHILS NFR BLD AUTO: 0 % (ref 0–1)
BILIRUB SERPL-MCNC: 0.67 MG/DL (ref 0.2–1)
BUN SERPL-MCNC: 11 MG/DL (ref 5–25)
CALCIUM SERPL-MCNC: 9.4 MG/DL (ref 8.4–10.2)
CHLORIDE SERPL-SCNC: 103 MMOL/L (ref 96–108)
CO2 SERPL-SCNC: 27 MMOL/L (ref 21–32)
CREAT SERPL-MCNC: 0.53 MG/DL (ref 0.6–1.3)
EOSINOPHIL # BLD AUTO: 0.11 THOUSAND/ÂΜL (ref 0–0.61)
EOSINOPHIL NFR BLD AUTO: 1 % (ref 0–6)
ERYTHROCYTE [DISTWIDTH] IN BLOOD BY AUTOMATED COUNT: 12.9 % (ref 11.6–15.1)
GFR SERPL CREATININE-BSD FRML MDRD: 89 ML/MIN/1.73SQ M
GLUCOSE P FAST SERPL-MCNC: 87 MG/DL (ref 65–99)
HCT VFR BLD AUTO: 39.4 % (ref 34.8–46.1)
HGB BLD-MCNC: 12.5 G/DL (ref 11.5–15.4)
IMM GRANULOCYTES # BLD AUTO: 0.04 THOUSAND/UL (ref 0–0.2)
IMM GRANULOCYTES NFR BLD AUTO: 0 % (ref 0–2)
LYMPHOCYTES # BLD AUTO: 13.56 THOUSANDS/ÂΜL (ref 0.6–4.47)
LYMPHOCYTES NFR BLD AUTO: 71 % (ref 14–44)
MCH RBC QN AUTO: 31.6 PG (ref 26.8–34.3)
MCHC RBC AUTO-ENTMCNC: 31.7 G/DL (ref 31.4–37.4)
MCV RBC AUTO: 100 FL (ref 82–98)
MONOCYTES # BLD AUTO: 0.51 THOUSAND/ÂΜL (ref 0.17–1.22)
MONOCYTES NFR BLD AUTO: 3 % (ref 4–12)
NEUTROPHILS # BLD AUTO: 4.73 THOUSANDS/ÂΜL (ref 1.85–7.62)
NEUTS SEG NFR BLD AUTO: 25 % (ref 43–75)
NRBC BLD AUTO-RTO: 0 /100 WBCS
PLATELET # BLD AUTO: 289 THOUSANDS/UL (ref 149–390)
PMV BLD AUTO: 10.7 FL (ref 8.9–12.7)
POTASSIUM SERPL-SCNC: 3.9 MMOL/L (ref 3.5–5.3)
PROT SERPL-MCNC: 6.8 G/DL (ref 6.4–8.4)
RBC # BLD AUTO: 3.96 MILLION/UL (ref 3.81–5.12)
SODIUM SERPL-SCNC: 136 MMOL/L (ref 135–147)
WBC # BLD AUTO: 19 THOUSAND/UL (ref 4.31–10.16)

## 2023-01-17 ENCOUNTER — TELEPHONE (OUTPATIENT)
Dept: PULMONOLOGY | Facility: CLINIC | Age: 83
End: 2023-01-17

## 2023-01-17 DIAGNOSIS — D72.829 LEUKOCYTOSIS, UNSPECIFIED TYPE: Primary | ICD-10-CM

## 2023-01-17 NOTE — TELEPHONE ENCOUNTER
Please let Joyce's caregiver know that her white blood cell count remains elevated and Dr Stas Alamo would like hematology evaluation  Hematology referral was placed  Thank you

## 2023-01-17 NOTE — TELEPHONE ENCOUNTER
Patients care giver is calling, she is wondering about Joyce's blood work results from 12/17/22   Please advise

## 2023-01-17 NOTE — TELEPHONE ENCOUNTER
Caregiver called and I informed her of the message   I also transferred her to [facility] otology so that she can schedule the appt for [facility]

## 2023-01-26 ENCOUNTER — TELEPHONE (OUTPATIENT)
Dept: PULMONOLOGY | Facility: CLINIC | Age: 83
End: 2023-01-26

## 2023-01-26 NOTE — TELEPHONE ENCOUNTER
Patients care giver is calling, it so happened that she was not given her daily dose of Azithromycin today at 9:30 am like she typically takes it  Noman Garza would like to know if she should still take the Azithromycin now? Or should she skip it for the day? She is taking [place] to the dentist so she asked if we could please call her cell below to know what to do  She will have the medication with her if the doctor decides to have her still take it today      Noman Garza  #[phone]

## 2023-02-03 ENCOUNTER — TELEPHONE (OUTPATIENT)
Dept: HEMATOLOGY ONCOLOGY | Facility: CLINIC | Age: 83
End: 2023-02-03

## 2023-02-03 NOTE — TELEPHONE ENCOUNTER
LM for daughter, Bethany Lamas, that appt   With Mario Govenkatesh was missed and left phone number for [facility] if they would like to R/S

## 2023-02-07 ENCOUNTER — TELEPHONE (OUTPATIENT)
Dept: OBGYN CLINIC | Facility: CLINIC | Age: 83
End: 2023-02-07

## 2023-02-07 NOTE — TELEPHONE ENCOUNTER
Caller: [facility])     Doctor: Stephanie Sanz     Reason for call: Patients pulmonologist scheduled apt same day as the one with Dr Christianson  Patient wanting to be seen but does not want to wait till next available  Any way to get patient in sooner ?      Call back#: [phone] or [phone]

## 2023-02-11 ENCOUNTER — HOSPITAL ENCOUNTER (EMERGENCY)
Facility: HOSPITAL | Age: 83
Discharge: HOME | End: 2023-02-11
Attending: EMERGENCY MEDICINE

## 2023-02-11 ENCOUNTER — APPOINTMENT (EMERGENCY)
Dept: RADIOLOGY | Facility: HOSPITAL | Age: 83
End: 2023-02-11

## 2023-02-11 VITALS
TEMPERATURE: 98.8 F | DIASTOLIC BLOOD PRESSURE: 62 MMHG | SYSTOLIC BLOOD PRESSURE: 114 MMHG | OXYGEN SATURATION: 100 % | RESPIRATION RATE: 18 BRPM | HEART RATE: 81 BPM

## 2023-02-11 DIAGNOSIS — S63.92XA HAND SPRAIN, LEFT, INITIAL ENCOUNTER: ICD-10-CM

## 2023-02-11 DIAGNOSIS — R22.32 LOCALIZED SWELLING ON LEFT HAND: Primary | ICD-10-CM

## 2023-02-11 RX ORDER — KETOROLAC TROMETHAMINE 30 MG/ML
15 INJECTION, SOLUTION INTRAMUSCULAR; INTRAVENOUS ONCE
Status: COMPLETED | OUTPATIENT
Start: 2023-02-11 | End: 2023-02-11

## 2023-02-11 RX ADMIN — KETOROLAC TROMETHAMINE 15 MG: 30 INJECTION, SOLUTION INTRAMUSCULAR at 20:53

## 2023-02-13 ENCOUNTER — TELEPHONE (OUTPATIENT)
Dept: OBGYN CLINIC | Facility: HOSPITAL | Age: 83
End: 2023-02-13

## 2023-02-13 NOTE — TELEPHONE ENCOUNTER
Caller: Michael Rocha - Care giver    Doctor: [facility]    Reason for call: Patient fell at home and presented to the ER for eval   Xrays were obtained and patient was advised nothing is broken but she should follow up with ortho  Patient denies any pain but does have swelling in her left hand & fingers    Asking if she should be seen sooner than scheduled appt 3/22, please advise    Call back#: [phone]

## 2023-02-16 NOTE — TELEPHONE ENCOUNTER
Caller: Micheline(Caregiver)    Doctor: Randell Burrell    Reason for call: Anastasia Renita called in wanted some clarity on how long to keep applying the heat  Also the patient keeps balling her hand into a fist  Also would like to know how long to keep the splint on  Please Advise!     Call back#: [phone] or [phone]

## 2023-02-17 NOTE — TELEPHONE ENCOUNTER
Please advise the patient that she can continue to apply heat as needed to help with pain and swelling  She is encouraged to make a fist and extend the hand as much as possible to maintain motion  In regards to the splint, it looks like she was supposed to follow-up on 2/22/2023  I see that the appointment was canceled and moved to 3/22/2023  If she could be placed back on that schedule for reevaluation, the splint can be removed at that time  Thank you

## 2023-02-17 NOTE — TELEPHONE ENCOUNTER
Attempted to call pt at [phone] and mailbox full  Attempted to call [phone] and phone rang and rang for quite some time w/out  or vm

## 2023-02-20 NOTE — TELEPHONE ENCOUNTER
Caller: Patient caregiver    Doctor: Elizabeth Bardales    Reason for call: Patient caregiver returning phone call  Relayed message above  Per saenz request could the patient be added to providers schedule for 3/22 for a splint reevaluation?  Please advise       Call back#: landline: [phone]     Caregivers Cell phone: [phone]

## 2023-02-20 NOTE — TELEPHONE ENCOUNTER
Caller: Patients caregiver    Doctor: Gisela Colindres    Reason for call: Patients caregiver returning phone call states there was a bad connection when call came in  Regarding scheduling patient before 3/22, Please advise      Call back#: landline: [phone]      Caregivers Cell phone:[phone]//Caregiver won't be in until 12pm on 2/21

## 2023-02-21 ENCOUNTER — TELEPHONE (OUTPATIENT)
Dept: HEMATOLOGY ONCOLOGY | Facility: CLINIC | Age: 83
End: 2023-02-21

## 2023-02-21 NOTE — TELEPHONE ENCOUNTER
Scheduling Appointment SEND TO POOLS    Person calling in Patient     If other than patient calling, are they listed on the communication consent form? No   Doctor HERNANDEZ Watts   Location [facility]   Appointment date and time 3/10/23   Reason for scheduling appointment Rescheduling Consult   Patient verbalized understanding?   Yes   No show policy reviewed with patient Yes

## 2023-02-22 ENCOUNTER — OFFICE VISIT (OUTPATIENT)
Dept: OBGYN CLINIC | Facility: HOSPITAL | Age: 83
End: 2023-02-22

## 2023-02-22 ENCOUNTER — OFFICE VISIT (OUTPATIENT)
Dept: PULMONOLOGY | Facility: CLINIC | Age: 83
End: 2023-02-22

## 2023-02-22 VITALS
HEIGHT: 62 IN | SYSTOLIC BLOOD PRESSURE: 117 MMHG | DIASTOLIC BLOOD PRESSURE: 74 MMHG | WEIGHT: 106 LBS | HEART RATE: 82 BPM | BODY MASS INDEX: 19.51 KG/M2

## 2023-02-22 VITALS
DIASTOLIC BLOOD PRESSURE: 68 MMHG | TEMPERATURE: 98.3 F | HEART RATE: 85 BPM | OXYGEN SATURATION: 97 % | HEIGHT: 62 IN | RESPIRATION RATE: 16 BRPM | SYSTOLIC BLOOD PRESSURE: 102 MMHG | WEIGHT: 114 LBS | BODY MASS INDEX: 20.98 KG/M2

## 2023-02-22 DIAGNOSIS — D72.820 LYMPHOCYTOSIS: ICD-10-CM

## 2023-02-22 DIAGNOSIS — R41.3 IMPAIRED MEMORY: ICD-10-CM

## 2023-02-22 DIAGNOSIS — R05.3 CHRONIC COUGH: ICD-10-CM

## 2023-02-22 DIAGNOSIS — A31.0 PULMONARY MYCOBACTERIAL INFECTION (HCC): ICD-10-CM

## 2023-02-22 DIAGNOSIS — J98.4 CAVITARY LESION OF LUNG: ICD-10-CM

## 2023-02-22 DIAGNOSIS — A31.0 MYCOBACTERIUM AVIUM COMPLEX (HCC): Primary | ICD-10-CM

## 2023-02-22 DIAGNOSIS — M18.0 PRIMARY OSTEOARTHRITIS OF BOTH FIRST CARPOMETACARPAL JOINTS: Primary | ICD-10-CM

## 2023-02-22 RX ORDER — BETAMETHASONE SODIUM PHOSPHATE AND BETAMETHASONE ACETATE 3; 3 MG/ML; MG/ML
6 INJECTION, SUSPENSION INTRA-ARTICULAR; INTRALESIONAL; INTRAMUSCULAR; SOFT TISSUE
Status: COMPLETED | OUTPATIENT
Start: 2023-02-22 | End: 2023-02-22

## 2023-02-22 RX ORDER — ETHAMBUTOL HYDROCHLORIDE 400 MG/1
800 TABLET, FILM COATED ORAL DAILY
Qty: 180 TABLET | Refills: 3 | Status: SHIPPED | OUTPATIENT
Start: 2023-02-22 | End: 2023-05-23

## 2023-02-22 RX ORDER — LIDOCAINE HYDROCHLORIDE 10 MG/ML
0.5 INJECTION, SOLUTION EPIDURAL; INFILTRATION; INTRACAUDAL; PERINEURAL
Status: COMPLETED | OUTPATIENT
Start: 2023-02-22 | End: 2023-02-22

## 2023-02-22 RX ORDER — RIFAMPIN 300 MG/1
600 CAPSULE ORAL DAILY
Qty: 180 CAPSULE | Refills: 3 | Status: SHIPPED | OUTPATIENT
Start: 2023-02-22 | End: 2023-05-23

## 2023-02-22 RX ORDER — AZITHROMYCIN 250 MG/1
250 TABLET, FILM COATED ORAL EVERY 24 HOURS
Qty: 90 TABLET | Refills: 3 | Status: SHIPPED | OUTPATIENT
Start: 2023-02-22 | End: 2023-05-23

## 2023-02-22 RX ADMIN — LIDOCAINE HYDROCHLORIDE 0.5 ML: 10 INJECTION, SOLUTION EPIDURAL; INFILTRATION; INTRACAUDAL; PERINEURAL at 08:35

## 2023-02-22 RX ADMIN — BETAMETHASONE SODIUM PHOSPHATE AND BETAMETHASONE ACETATE 6 MG: 3; 3 INJECTION, SUSPENSION INTRA-ARTICULAR; INTRALESIONAL; INTRAMUSCULAR; SOFT TISSUE at 08:35

## 2023-02-22 NOTE — PROGRESS NOTES
ASSESSMENT/PLAN:    Assessment:   Bilateral CMC OA  Recent fall with pain and swelling over the digits      Plan:  X-ray was reviewed with the patient and her caregiver  Patient was given Left thumb CMC cortisone injection - betamethasone  She tolerated well  She was advised we can repeat the cortisone injections every 3-4 months as needed for pain   She will follow-up with us in 3-4 months for re-evaluation     Follow Up:  3-4 months     To Do Next Visit:  Re-evaluation    General Discussions:  Aia 16 Arthritis: The anatomy and physiology of carpometacarpal joint arthritis was discussed with the patient today in the office  Deterioration of the articular cartilage eventually leads to hypermobility at the thumb Aia 16 joint, resulting in joint subluxation, osteophyte formation, cystic changes within the trapezium and base of the first metacarpal, as well as subchondral sclerosis  Eventually, pain, limited mobility, and compensatory hyperextension at the metacarpophalangeal joint may develop  While normal activity and usage of the thumb joint may provide a painful experience to the patient, this typically does not result in damage to the thumb or hand  Treatment options include resting thumb spica splints to decreased joint edema, pain, and inflammation  Therapy exercises to strengthen the thenar musculature may relieve pain, but do not alter the overall continued development of osteoarthritis  Oral medications, topical medications, corticosteroid injections may decrease pain and increase overall function  Eventually, approximately 5% of patients may require surgical intervention           _____________________________________________________  CHIEF COMPLAINT:  Chief Complaint   Patient presents with   • Left Thumb - Follow-up     CMC- Beta    • Right Thumb - Follow-up     CMC- Beta          SUBJECTIVE:  Norbert Michael is a [de-identified] y o  female who presents for follow up regarding Thumb CMC Arthritis  bilateral  Since last visit, Jerel Call has tried steroid injections with only partial relief  Patient report a fall at home on 2/09/23  She was seen in the ED x-rays were normal   She was placed in a splint and referred her today  Patient states the right [facility] is still doing well  She has had increased pain in the left  She states that she has swelling into her digits as well  She has been working on her range of motion and has been using heat  PAST MEDICAL HISTORY:  Past Medical History:   Diagnosis Date   • Lung nodule        PAST SURGICAL HISTORY:  Past Surgical History:   Procedure Laterality Date   • [place] [address] INCL FLUOR GDNCE DX W/CELL [facility] SPX Right 7/16/2018    Procedure: General Rivera;  Surgeon: Ivania Starks MD;  [facility]: [facility]; Service: Pulmonary   • [place] [address] INCL FLUOR GDNCE DX W/CELL [facility] SPX N/A 12/17/2018    Procedure: General Rivera;  Surgeon: Ivania Starks MD;  [facility];   Service: Pulmonary       FAMILY HISTORY:  Family History   Problem Relation Age of Onset   • Dementia Mother    • Hypertension Father    • No Known Problems Daughter    • No Known Problems Maternal Grandmother    • No Known Problems Maternal Grandfather    • No Known Problems Daughter        SOCIAL HISTORY:  Social History     Tobacco Use   • Smoking status: Never   • Smokeless tobacco: Never   Vaping Use   • Vaping Use: Never used   Substance Use Topics   • Alcohol use: No   • Drug use: No       MEDICATIONS:    Current Outpatient Medications:   •  ALPRAZolam (XANAX) 0 25 mg tablet, Take 0 25 mg by mouth 3 (three) times a day as needed, Disp: , Rfl:   •  Arnuity Ellipta 100 MCG/ACT AEPB inhaler, INHALE 1 PUFF DAILY RINSE MOUTH AFTER USE , Disp: 30 blister, Rfl: 5  •  azithromycin (ZITHROMAX) 250 mg tablet, TAKE 1 TABLET (250 MG TOTAL) BY MOUTH EVERY 24 HOURS, Disp: 30 tablet, Rfl: 2  •  benzonatate (TESSALON) 200 MG capsule, Take 1 capsule (200 mg total) by mouth 3 (three) times a day as needed for cough, Disp: 90 capsule, Rfl: 1  •  busPIRone (BUSPAR) 7 5 mg tablet, , Disp: , Rfl: 0  •  Cholecalciferol (VITAMIN D3 PO), Take by mouth, Disp: , Rfl:   •  Coenzyme Q10 (COQ-10 PO), Take 200 mg by mouth daily  , Disp: , Rfl:   •  docusate sodium (COLACE) 100 mg capsule, Take 100 mg by mouth daily, Disp: , Rfl:   •  dorzolamide-timolol (COSOPT) 22 3-6 8 MG/ML ophthalmic solution, INSTILL 1 DROP INTO THE LEFT EYE THREE TIMES DAILY, Disp: , Rfl:   •  escitalopram (LEXAPRO) 10 mg tablet, , Disp: , Rfl:   •  ethambutol (MYAMBUTOL) 400 mg tablet, TAKE 2 TABLETS BY MOUTH DAILY  , Disp: 180 tablet, Rfl: 3  •  fluticasone (FLONASE) 50 mcg/act nasal spray, 1 spray into each nostril daily at bedtime, Disp: , Rfl:   •  guaiFENesin (MUCINEX) 600 mg 12 hr tablet, Take 600 mg by mouth every 12 (twelve) hours, Disp: , Rfl:   •  ipratropium (ATROVENT) 0 02 % nebulizer solution, Take 2 5 mL (0 5 mg total) by nebulization 2 (two) times a day, Disp: 150 mL, Rfl: 2  •  lidocaine (LIDODERM) 5 %, Apply 1 patch topically daily Remove & Discard patch within 12 hours or as directed by MD, Disp: 30 patch, Rfl: 0  •  LUMIGAN 0 01 % ophthalmic drops, 1 DROP INTO BOTH EYES AT BEDTIME, Disp: , Rfl: 4  •  magnesium 30 MG tablet, Take 30 mg by mouth 2 (two) times a day, Disp: , Rfl:   •  memantine (NAMENDA) 10 mg tablet, , Disp: , Rfl:   •  memantine (NAMENDA) 5 mg tablet, Take 5 mg by mouth 2 (two) times a day, Disp: , Rfl:   •  Myrbetriq 50 MG TB24, Take 1 tablet by mouth daily, Disp: , Rfl:   •  Prolensa 0 07 % SOLN, , Disp: , Rfl:   •  rifampin (RIFADIN) 300 mg capsule, Take 2 capsules (600 mg total) by mouth daily, Disp: 60 capsule, Rfl: 2  •  rifampin (RIFADIN) 300 mg capsule, Take 2 capsules (600 mg total) by mouth daily, Disp: 60 capsule, Rfl: 5  •  Sodium Fluoride 5000 PPM 1 1 % PSTE, BRUSH 1-2 TIMES DAILY AS DIRECTED DO NOT RINSE AFTER USE, Disp: , Rfl:   •  timolol (TIMOPTIC-XE) 0 25 % ophthalmic gel-forming, INSTILL 1 DROP INTO LEFT EYE EVERY MORNING, Disp: , Rfl: 4  •  Toviaz 8 MG TB24, Take 1 tablet by mouth daily, Disp: , Rfl:   •  TURMERIC PO, Take by mouth, Disp: , Rfl:   •  zinc gluconate 50 mg tablet, Take 50 mg by mouth daily, Disp: , Rfl:     ALLERGIES:  No Known Allergies    REVIEW OF SYSTEMS:  Pertinent items are noted in HPI  A comprehensive review of systems was negative  LABS:  HgA1c: No results found for: HGBA1C  BMP:   Lab Results   Component Value Date    CALCIUM 9 4 12/17/2022    K 3 9 12/17/2022    CO2 27 12/17/2022     12/17/2022    BUN 11 12/17/2022    CREATININE 0 53 (L) 12/17/2022           _____________________________________________________  PHYSICAL EXAMINATION:  Vital signs: There were no vitals taken for this visit  General: well developed and well nourished, alert, oriented times 3 and appears comfortable  Psychiatric: Normal  HEENT: Trachea Midline, No torticollis  Cardiovascular: No discernable arrhythmia  Pulmonary: No wheezing or stridor  Abdomen: No rebound or guarding  Extremities: No peripheral edema  Skin: No masses, erythema, lacerations, fluctation, ulcerations  Neurovascular: Sensation Intact to the Median, Ulnar, Radial Nerve, Motor Intact to the Median, Ulnar, Radial Nerve and Pulses Intact    MUSCULOSKELETAL EXAMINATION:  Bilateral thumb CMC Exam:  No adduction contracture  40 hyperextension deformity of MCP joint  Positive localized tenderness over radial and dorsal aspect of thumb (CMC joint)  Grind test is Positive for pain and Positive for crepitus  No triggering or tenderness over the A1 pulley  No pain with Finkelstein’s maneuver     _____________________________________________________  STUDIES REVIEWED:  Images were reviewed in PACS by Dr Jesus Tong and demonstrate: Left hand x-ray demonstrates no fracture of dislocation    Osteoarthritis noted at the base of the thumb      PROCEDURES PERFORMED:  Small joint arthrocentesis: L thumb CMC  Homer Protocol:  Consent: Verbal consent obtained    Consent given by: patient  Patient understanding: patient states understanding of the procedure being performed  Site marked: the operative site was marked  Patient identity confirmed: verbally with patient    Supporting Documentation  Indications: pain   Procedure Details  Location: thumb - L thumb CMC  Preparation: Patient was prepped and draped in the usual sterile fashion  Needle size: 25 G  Ultrasound guidance: no  Approach: radial  Medications administered: 6 mg betamethasone acetate-betamethasone sodium phosphate 6 (3-3) mg/mL; 0 5 mL lidocaine (PF) 1 %    Patient tolerance: patient tolerated the procedure well with no immediate complications  Dressing:  Sterile dressing applied

## 2023-02-22 NOTE — PROGRESS NOTES
Progress Note - Pulmonary   Janelle Escobar [de-identified] y o  female MRN: [id number]   Encounter: [id number]      Assessment/Plan:  Patient is an 82-year-old female with past medical history significant for MAC on triple therapy who presents for routine follow-up  Overall, the patient has been stable since last visit  She has no new complaints  The patient did have an elevated lymphocyte percentage but unfortunately missed her last heme appointment yet this has been rescheduled  Given her overall stability on current triple therapy and lack of symptoms, will continue  It was recently checked and there does appear to be persistent colonization      Lymphocytosis  -  has follow up scheduled with oncology  -  Lymphocytosis is stable  -  Concern for CLL vs drug reaction     MAC  -  Recently positive on sputum  -  Has a cavitary lung lesoin  -  Continue triple therapy              -  Will plan for indefinite given recent positive testing in setting of cavitary lung disease    Chronic cough  -  Stable  -  No recent exacerbations    Impaired memory  -  Followed by PCP    1  Mycobacterium avium complex ([facility] [address] )    2  Lymphocytosis    3  Impaired memory    4  Cavitary lesion of lung    5  Chronic cough      Patient may follow up in 6 months or sooner as necessary  Orders:  No orders of the defined types were placed in this encounter  Subjective: The patient's caretaker reports she has never had to use it  The patient has never required it  She notes her breathing is doing well  She will occasionally have trouble swallowing the mucinex  The patient had missed a recent oncology appointment but has rescheduled  She has a chronic mild cough for which she takes honey  She denies fevers, chills, nausea or vomiting  She is eating well  The patient was recently in ER with a brace  It has been removed  Inhaler Regimen:  None    Remainder of review of systems negative except as described in HPI  The following portions of the patient's history were reviewed and updated as appropriate: allergies, current medications, past family history, past medical history, past social history, past surgical history and problem list      Objective:   Vitals: Blood pressure 102/68, pulse 85, temperature 98 3 °F (36 8 °C), temperature source Tympanic, resp  rate 16, height 5' 2" (1 575 m), weight 51 7 kg (114 lb), SpO2 97 % , RA, Body mass index is 20 85 kg/m²  Physical Exam  Gen: Pleasant, awake, alert, oriented x 3, no acute distress  HEENT: Mucous membranes moist, no oral lesions, no thrush  NECK: No accessory muscle use, JVP not elevated  Cardiac: RRR, single S1, single S2, no murmurs, no rubs, no gallops  Lungs: CTA b/l; no w/r/c  Abdomen: normoactive bowel sounds, soft nontender, nondistended, no rebound or rigidity, no guarding  Extremities: no cyanosis, no clubbing, no LE edema  MSK:  Strength equal in all extremities  Derm:  No rashes/lesions noted  Neuro:  Appropriate mood/affect    Labs: I have personally reviewed pertinent lab results  Lab Results   Component Value Date    WBC 19 00 (H) 12/17/2022    HGB 12 5 12/17/2022    HGB 15 0 05/24/2014     12/17/2022     Lab Results   Component Value Date    CREATININE 0 53 (L) 12/17/2022        Imaging and other studies: I have personally reviewed pertinent reports  and I have personally reviewed pertinent films in PACS  CT Chest 9/8/2020  My interpretation:  Cavitary lung lesion    Radiology findings:  LUNGS:  There has been continued increase in the cavitary consolidation in the posterior right upper lobe and now with confluent cavitary consolidation throughout the completely collapsed right middle lobe  A large confluent cavity in the posterior   segment right upper lobe measures 6 4 x 5 6 x 6 7 1 cm on image 28 of series 3 and image 113 of series 602 as compared with 7 2 x 5 1 x 5 5 cm when measured using similar technique on October 8, 2018  Cavitary consolidation completely encompasses the right middle lobe which represents marked progression from prior examination  There are new and enlarging  A representative lesion measures 1 9 cm in the lateral right lower lobe on image 73 of series 3, either small or not present on October 8, 2018  Patchy nodular infiltrates in the left lung are similar from prior examination  Bronchiectatic changes are reidentified  Héctor Side PLEURA:  Consolidative cavitary change in the posterior right upper lobe and in the right middle lobe is inseparable from pleural surface  No pleural mass or pleural effusion  HEART/GREAT VESSELS:  Normal heart size  Coronary artery calcifications noted  Normal caliber thoracic aorta with minimal atherosclerotic calcifications  Heart and mediastinal contents are shifted towards the right due to loss of volume as a result of   confluent cavities and posterior right upper lobe and right middle lobe  MEDIASTINUM AND CECI:  Unchanged borderline right paratracheal node, 7 mm in short axis  CHEST WALL AND LOWER NECK:   Multi nodular thyroid gland with asymmetric enlargement of the right lobe appears unchanged  Bilateral breast implants again noted  No adenopathy  Pulmonary Function Testing:   No pulmonary function testing available for review  Treasure Sotomayor

## 2023-03-08 ENCOUNTER — TELEPHONE (OUTPATIENT)
Dept: OBGYN CLINIC | Facility: HOSPITAL | Age: 83
End: 2023-03-08

## 2023-03-08 NOTE — TELEPHONE ENCOUNTER
Caller: Micheline caregiver    Doctor: Mosie Lesch    Reason for call: Patients caregiver states patient doesn't have swelling in hand and is asking if they can stop using heat Please advise     Call back#: [phone]

## 2023-03-08 NOTE — TELEPHONE ENCOUNTER
Caller: Parminder Sanchez- caregiver     Doctor: González Monroy    Reason for call:  Patients caregiver pat is calling because patient was advised to keep heat on every hour  She would like to know how many weeks she should be doing this?     Call back#: [phone]

## 2023-03-08 NOTE — TELEPHONE ENCOUNTER
She can continue to use heat to help with swelling  There is no end from a timeframe standpoint  Thank you

## 2023-03-09 NOTE — TELEPHONE ENCOUNTER
Called and informed Ute Alfredo, that there is no end timeframe for heat  She can stop the heat if the pt does not have swelling in hand  Also informed her that I tried to call last night but her VM was full and I was unable to leave a msg

## 2023-03-10 ENCOUNTER — TELEPHONE (OUTPATIENT)
Dept: SURGICAL ONCOLOGY | Facility: CLINIC | Age: 83
End: 2023-03-10

## 2023-03-10 NOTE — TELEPHONE ENCOUNTER
Called pt to leave vm to [facility], pt phone went straight to vm, mailbox is full

## 2023-03-23 ENCOUNTER — TELEPHONE (OUTPATIENT)
Dept: HEMATOLOGY ONCOLOGY | Facility: CLINIC | Age: 83
End: 2023-03-23

## 2023-03-23 NOTE — TELEPHONE ENCOUNTER
Scheduling Appointment SEND TO [facility]    Person calling in Benedicto Rued K  If other than patient calling, are they listed on the communication consent form? No - Aware that it will need to be updated at appointment    Doctor HERNANDEZ Campos   Location [facility]   Appointment date and time 04/25/23 9AM   Reason for scheduling appointment Consult r/s   Patient verbalized understanding?   Yes   No show policy reviewed with patient Yes

## 2023-04-19 ENCOUNTER — TELEPHONE (OUTPATIENT)
Dept: LAB | Facility: HOSPITAL | Age: 83
End: 2023-04-19

## 2023-04-19 NOTE — TELEPHONE ENCOUNTER
4/19- called pt caretaker - she did not call to make an appt - she informed me pt was just put into a rehab facility today - service not needed at this time

## 2023-04-20 ENCOUNTER — NURSING HOME VISIT (OUTPATIENT)
Dept: GERIATRICS | Facility: OTHER | Age: 83
End: 2023-04-20

## 2023-04-20 DIAGNOSIS — A31.0 MYCOBACTERIUM AVIUM COMPLEX (HCC): ICD-10-CM

## 2023-04-20 DIAGNOSIS — G30.1 MODERATE LATE ONSET ALZHEIMER'S DEMENTIA WITHOUT BEHAVIORAL DISTURBANCE, PSYCHOTIC DISTURBANCE, MOOD DISTURBANCE, OR ANXIETY (HCC): Primary | ICD-10-CM

## 2023-04-20 DIAGNOSIS — M18.0 PRIMARY OSTEOARTHRITIS OF BOTH FIRST CARPOMETACARPAL JOINTS: ICD-10-CM

## 2023-04-20 DIAGNOSIS — F02.B0 MODERATE LATE ONSET ALZHEIMER'S DEMENTIA WITHOUT BEHAVIORAL DISTURBANCE, PSYCHOTIC DISTURBANCE, MOOD DISTURBANCE, OR ANXIETY (HCC): Primary | ICD-10-CM

## 2023-04-20 DIAGNOSIS — R26.2 AMBULATORY DYSFUNCTION: ICD-10-CM

## 2023-04-20 DIAGNOSIS — R32 URINARY INCONTINENCE, UNSPECIFIED TYPE: ICD-10-CM

## 2023-04-20 NOTE — PROGRESS NOTES
Ramya [address]  [address], [address] [facility]  [address]  History and Physical    NAME: Jaoquin Wright  AGE: [de-identified] y o  SEX: female [id number]    DATE OF ENCOUNTER: 4/30/2023    Code status:  No CPR    Assessment and Plan     1  Moderate late onset Alzheimer's dementia without behavioral disturbance, psychotic disturbance, mood disturbance, or anxiety (HCC)  - redirection, reorientation  - assistance with ADLs  - cont Namenda 10 mg     2  Ambulatory dysfunction  - wheelchair mobility  - Fall precautions in place  - PT ordered    3  Primary osteoarthritis of both first carpometacarpal joints  - gets steroid inj  - cont Tylenol as needed    4  Mycobacterium avium complex ([address] )  - stable    5  Urinary incontinence, unspecified type  - cont Myrbetriq  - scheduled voiding      All medications and routine orders were reviewed and updated as needed  Plan discussed with: caregiver and staff    Chief Complaint     Seen for admission at [address] [place] [address], a [de-identified] y/o female with PMH of advanced dementia, cavitary lesion of lung (MAC), depression, got admitted to the hospital after a seizure like episode [facility]  Neurology on case, EEG was done, negative for seizure activity  Family refused further work up including MRI head  Her overall condition was back to baseline  She got discharged to [facility] for long term dementia unit and hospice consult  She was seen and examined, stable  Her long term for 40 years caregiver was with her  Caregiver provided all the history  Layton Mejia lived at home with her , gets 24/7 caregiver support due to her advanced dementia  She needs assistance with ADLs, can self feed with set up at baseline  She doesnot use any assistive devices  Her  passed away 6 months back  Staff have no concerns at this time       HISTORY:  Past Medical History: Diagnosis Date    Lung nodule      Family History   Problem Relation Age of Onset    Dementia Mother     Hypertension Father     No Known Problems Daughter     No Known Problems Maternal Grandmother     No Known Problems Maternal Grandfather     No Known Problems Daughter      Social History     Socioeconomic History    Marital status:      Spouse name: Not on file    Number of children: Not on file    Years of education: Not on file    Highest education level: Not on file   Occupational History    Not on file   Tobacco Use    Smoking status: Never    Smokeless tobacco: Never   Vaping Use    Vaping Use: Never used   Substance and Sexual Activity    Alcohol use: No    Drug use: No    Sexual activity: Not on file   Other Topics Concern    Not on file   Social History Narrative    Not on file     Social Determinants of Health     Financial Resource Strain: Not on file   Food Insecurity: Not on file   Transportation Needs: Not on file   Physical Activity: Not on file   Stress: Not on file   Social Connections: Not on file   Intimate Partner Violence: Not on file   Housing Stability: Not on file       Allergies:  No Known Allergies    Review of Systems     Review of Systems   Unable to perform ROS: Dementia   As in HPI  Medications and orders     All medications reviewed and updated in Nursing Home EMR  Objective     Vitals: T: 97 9, P: 82, R: 16, BP: 11/79, 96% on RA, Wt: 106 lbs    Physical Exam  Vitals and nursing note reviewed  Constitutional:       General: She is not in acute distress  Appearance: Normal appearance  She is well-developed  She is not diaphoretic  HENT:      Head: Normocephalic and atraumatic  Nose: Nose normal       Mouth/Throat:      Mouth: Mucous membranes are moist       Pharynx: Oropharynx is clear  No oropharyngeal exudate  Eyes:      General: No scleral icterus  Right eye: No discharge  Left eye: No discharge        Extraocular Movements: Extraocular movements intact  Conjunctiva/sclera: Conjunctivae normal    Cardiovascular:      Rate and Rhythm: Normal rate and regular rhythm  Heart sounds: Normal heart sounds  No murmur heard  Pulmonary:      Effort: Pulmonary effort is normal  No respiratory distress  Breath sounds: Normal breath sounds  No wheezing  Chest:      Chest wall: No tenderness  Abdominal:      General: Bowel sounds are normal       Palpations: Abdomen is soft  Tenderness: There is no abdominal tenderness  There is no guarding or rebound  Musculoskeletal:         General: No tenderness or deformity  Normal range of motion  Cervical back: Normal range of motion and neck supple  Right lower leg: No edema  Left lower leg: No edema  Skin:     General: Skin is warm and dry  Neurological:      Mental Status: She is alert  Comments: Able to tell her first name  Minimally verbal  Pleasant  Unable to follow commands   Psychiatric:         Mood and Affect: Mood normal          Behavior: Behavior normal       Comments: Pleasantly confused  Pertinent Laboratory/Diagnostic Studies: The following labs/studies were reviewed please see chart or hospital paperwork for details    Ref Range & Units 4/14/23 0359    Hemoglobin 11 5 - 14 5 g/dL 12 3    Hematocrit 35 0 - 43 0 % 36 4    WBC 4 0 - 10 0 thou/cmm 16 8 High     RBC 3 70 - 4 70 mill/cmm 3 80    Platelet Count 125 - 350 thou/cmm 273    MPV 7 5 - 11 3 fL 8 4    MCV 80 - 100 fL 96    MCH 26 0 - 34 0 pg 32 4    MCHC 32 0 - 37 0 g/dL 33 8    RDW 12 0 - 16 0 % 13 5    Differential Type  AUTO    Absolute Neutrophils 1 8 - 7 8 thou/cmm 4 9    Absolute Lymphocytes 1 0 - 3 0 thou/cmm 11 1 High     Absolute Monocytes 0 3 - 1 0 thou/cmm 0 5    Absolute Eosinophils 0 0 - 0 5 thou/cmm 0 2    Absolute Basophils 0 0 - 0 1 thou/cmm 0 1    Neutrophils % 29    Lymphocytes % 67    Monocytes % 3    Eosinophils % 1    Basophils % 0    Anisocytosis Slight    Ovalocytes  Slight    Smudge Cell  Moderate     - Counseling Documentation: patient was counseled regarding: prognosis

## 2023-04-24 ENCOUNTER — TELEPHONE (OUTPATIENT)
Dept: HEMATOLOGY ONCOLOGY | Facility: CLINIC | Age: 83
End: 2023-04-24

## 2023-04-24 PROBLEM — F02.B0 MODERATE LATE ONSET ALZHEIMER'S DEMENTIA WITHOUT BEHAVIORAL DISTURBANCE, PSYCHOTIC DISTURBANCE, MOOD DISTURBANCE, OR ANXIETY (HCC): Status: ACTIVE | Noted: 2023-04-24

## 2023-04-24 PROBLEM — R32 URINARY INCONTINENCE: Status: ACTIVE | Noted: 2023-04-24

## 2023-04-24 PROBLEM — R26.2 AMBULATORY DYSFUNCTION: Status: ACTIVE | Noted: 2023-04-24

## 2023-04-24 PROBLEM — G30.1 MODERATE LATE ONSET ALZHEIMER'S DEMENTIA WITHOUT BEHAVIORAL DISTURBANCE, PSYCHOTIC DISTURBANCE, MOOD DISTURBANCE, OR ANXIETY (HCC): Status: ACTIVE | Noted: 2023-04-24

## 2023-04-24 NOTE — TELEPHONE ENCOUNTER
Appointment Change  Cancel, Reschedule, Change to Virtual      Who are you speaking with? Care giver   If it is not the patient, are they listed on an active communication consent form? No   Which provider is the appointment scheduled with? HERNANDEZ Carbone   When is the appointment scheduled? Please list date and time 04/25/23 9AM   At which location is the appointment scheduled to take place? [facility]   Was the appointment rescheduled or changed from an in person visit to a virtual visit? If so, please list the details of the change  06/13/23 2PM    What is the reason for the appointment change? Patient did not have labs done in time  Was [facility] transport scheduled for this visit? No   Does [facility] transport need to be scheduled for the new visit (if applicable) N/A   Does the patient need an infusion appointment rescheduled? No   Does the patient have an infusion appointment scheduled? If so, when? No   Is the patient undergoing chemotherapy? No   Was the no-show policy reviewed for appointments being changed with less then 24 hours of notice?  N/A

## 2023-04-24 NOTE — TELEPHONE ENCOUNTER
Called to R/S appt because pt needs to  have labs done prior to the appt   Coud'nt leave V/M on either line

## 2023-06-15 ENCOUNTER — NURSING HOME VISIT (OUTPATIENT)
Dept: GERIATRICS | Facility: OTHER | Age: 83
End: 2023-06-15
Payer: MEDICARE

## 2023-06-15 VITALS
TEMPERATURE: 97.9 F | DIASTOLIC BLOOD PRESSURE: 78 MMHG | OXYGEN SATURATION: 97 % | HEART RATE: 72 BPM | BODY MASS INDEX: 19.5 KG/M2 | RESPIRATION RATE: 16 BRPM | WEIGHT: 106.6 LBS | SYSTOLIC BLOOD PRESSURE: 160 MMHG

## 2023-06-15 DIAGNOSIS — K59.01 SLOW TRANSIT CONSTIPATION: ICD-10-CM

## 2023-06-15 DIAGNOSIS — M18.0 PRIMARY OSTEOARTHRITIS OF BOTH FIRST CARPOMETACARPAL JOINTS: ICD-10-CM

## 2023-06-15 DIAGNOSIS — Z51.5 HOSPICE CARE PATIENT: ICD-10-CM

## 2023-06-15 DIAGNOSIS — G30.1 MODERATE LATE ONSET ALZHEIMER'S DEMENTIA WITHOUT BEHAVIORAL DISTURBANCE, PSYCHOTIC DISTURBANCE, MOOD DISTURBANCE, OR ANXIETY (HCC): ICD-10-CM

## 2023-06-15 DIAGNOSIS — F02.B0 MODERATE LATE ONSET ALZHEIMER'S DEMENTIA WITHOUT BEHAVIORAL DISTURBANCE, PSYCHOTIC DISTURBANCE, MOOD DISTURBANCE, OR ANXIETY (HCC): ICD-10-CM

## 2023-06-15 DIAGNOSIS — J98.4 CAVITARY LESION OF LUNG: Primary | ICD-10-CM

## 2023-06-15 PROCEDURE — 99347 HOME/RES VST EST SF MDM 20: CPT | Performed by: NURSE PRACTITIONER

## 2023-06-15 RX ORDER — AMOXICILLIN 250 MG
1 CAPSULE ORAL DAILY
Start: 2023-06-15

## 2023-06-15 RX ORDER — SCOLOPAMINE TRANSDERMAL SYSTEM 1 MG/1
1 PATCH, EXTENDED RELEASE TRANSDERMAL
Start: 2023-06-15

## 2023-06-15 RX ORDER — MORPHINE SULFATE 100 MG/5ML
5 SOLUTION, CONCENTRATE ORAL
Refills: 0
Start: 2023-06-15

## 2023-06-15 RX ORDER — HALOPERIDOL 0.5 MG/1
0.5 TABLET ORAL EVERY 2 HOUR PRN
Start: 2023-06-15

## 2023-06-15 RX ORDER — ACETAMINOPHEN 325 MG/1
650 TABLET ORAL EVERY 6 HOURS PRN
Start: 2023-06-15

## 2023-06-15 NOTE — ASSESSMENT & PLAN NOTE
· AAOx 1   · Minimally verbal, mumbles   · Still smiles, makes eye contact but attention is poor   · Needs 24/7 assistance and supervision  · Continue Hospice care  · Continue haldol prn   · Continue morphine prn  · Bowel regimen

## 2023-06-15 NOTE — ASSESSMENT & PLAN NOTE
Started services with hospice at [facility]  Continue PRN morphine for pain/sob  Continue Haldol prn for agitation/hallucinations  Bowel regimen   Continue scopolamine patch for secretions

## 2023-06-15 NOTE — ASSESSMENT & PLAN NOTE
· Lungs diminished but clear  · Occasional cough at times  · Stable on room air   · On hospice cares  · Continue Scopolamine patch

## 2023-06-15 NOTE — PROGRESS NOTES
[facility]  Delores Barboza 81  [phone]  [facility]  Code 13        NAME: Beverly Arguello  AGE: 82 y o  SEX: female CODE STATUS: Hospice    DATE OF ENCOUNTER: 6/15/2023    Assessment and Plan     1  Cavitary lesion of lung  Assessment & Plan:  · Lungs diminished but clear  · Occasional cough at times  · Stable on room air   · On hospice cares  · Continue Scopolamine patch    Orders:  -     acetaminophen (TYLENOL) 325 mg tablet; Take 2 tablets (650 mg total) by mouth every 6 (six) hours as needed for mild pain  -     haloperidol (HALDOL) 0 5 mg tablet; Take 1 tablet (0 5 mg total) by mouth every 2 (two) hours as needed for agitation  -     Morphine Sulfate, Concentrate, 20 mg/mL concentrated solution; Take 0 25 mL (5 mg total) by mouth every hour as needed for moderate pain or severe pain Max Daily Amount: 120 mg  -     scopolamine (TRANSDERM-SCOP) 1 mg/3 days TD 72 hr patch; Place 1 patch on the skin over 72 hours every third day  -     senna-docusate sodium (SENOKOT S) 8 6-50 mg per tablet; Take 1 tablet by mouth daily    2  Moderate late onset Alzheimer's dementia without behavioral disturbance, psychotic disturbance, mood disturbance, or anxiety (HCC)  Assessment & Plan:  · AAOx 1   · Minimally verbal, mumbles   · Still smiles, makes eye contact but attention is poor   · Needs 24/7 assistance and supervision  · Continue Hospice care  · Continue haldol prn   · Continue morphine prn  · Bowel regimen    Orders:  -     acetaminophen (TYLENOL) 325 mg tablet; Take 2 tablets (650 mg total) by mouth every 6 (six) hours as needed for mild pain  -     haloperidol (HALDOL) 0 5 mg tablet; Take 1 tablet (0 5 mg total) by mouth every 2 (two) hours as needed for agitation  -     Morphine Sulfate, Concentrate, 20 mg/mL concentrated solution;  Take 0 25 mL (5 mg total) by mouth every hour as needed for moderate pain or severe pain Max Daily Amount: 120 mg  -     scopolamine (TRANSDERM-SCOP) 1 mg/3 days TD 72 hr patch; Place 1 patch on the skin over 72 hours every third day  -     senna-docusate sodium (SENOKOT S) 8 6-50 mg per tablet; Take 1 tablet by mouth daily    3  Slow transit constipation  Assessment & Plan:  Doing well on current bowel regimen  Continue senna-s 1 tab q hs- hold for loose stools     Orders:  -     senna-docusate sodium (SENOKOT S) 8 6-50 mg per tablet; Take 1 tablet by mouth daily    4  Primary osteoarthritis of both first carpometacarpal joints  Assessment & Plan:  · Chronic   · Denies pain on exam   · Continue Tylenol prn      Orders:  -     acetaminophen (TYLENOL) 325 mg tablet; Take 2 tablets (650 mg total) by mouth every 6 (six) hours as needed for mild pain    5  Hospice care patient  Assessment & Plan:  Started services with hospice at [facility]  Continue PRN morphine for pain/sob  Continue Haldol prn for agitation/hallucinations  Bowel regimen   Continue scopolamine patch for secretions     Orders:  -     haloperidol (HALDOL) 0 5 mg tablet; Take 1 tablet (0 5 mg total) by mouth every 2 (two) hours as needed for agitation  -     Morphine Sulfate, Concentrate, 20 mg/mL concentrated solution; Take 0 25 mL (5 mg total) by mouth every hour as needed for moderate pain or severe pain Max Daily Amount: 120 mg  -     scopolamine (TRANSDERM-SCOP) 1 mg/3 days TD 72 hr patch; Place 1 patch on the skin over 72 hours every third day  -     senna-docusate sodium (SENOKOT S) 8 6-50 mg per tablet; Take 1 tablet by mouth daily       All medications and routine orders were reviewed and updated as needed  Chief Complaint     LTC follow up visit     History of Present Illness     Shelley King is a 82year old female, she is a LTC resident of [address] [place] [address] since April 2023  Past Medical Hx including but not limited to Advanced Dementia, Cavitary lesion of Lung (MAC), Depression seen in collaboration with nursing for medical mgmt and LTC follow up       Seen and examined at bedside today  Patient is a poor historian due to Alzheimer's/Dementia  History is obtained from review of records/ staff and her personal caregiver who is present  Caregiver is feeding her lunch  Caregiver has no concerns at this time  Patient needs 24/7 care, assistance with ADLs/IADLs  She is able to feed herself with assistance  She is mostly in w/c  She is on hospice services  Staff have no concerns at this time  The patient's allergies, past medical, surgical, social and family history were reviewed and unchanged  Review of Systems     Review of Systems   Unable to perform ROS: Dementia         Objective     Vitals:   Vitals:    06/15/23 1406   BP: 160/78   Pulse: 72   Resp: 16   Temp: 97 9 °F (36 6 °C)   SpO2: 97%         Physical Exam  Vitals and nursing note reviewed  Constitutional:       General: She is not in acute distress  Appearance: Normal appearance  HENT:      Head: Normocephalic and atraumatic  Nose: No congestion or rhinorrhea  Mouth/Throat:      Mouth: Mucous membranes are moist    Eyes:      General: No scleral icterus  Conjunctiva/sclera: Conjunctivae normal       Pupils: Pupils are equal, round, and reactive to light  Cardiovascular:      Rate and Rhythm: Normal rate and regular rhythm  Pulses: Normal pulses  Heart sounds: Normal heart sounds  No murmur heard  Pulmonary:      Effort: Pulmonary effort is normal  No respiratory distress  Breath sounds: Normal breath sounds  No wheezing, rhonchi or rales  Abdominal:      General: Bowel sounds are normal  There is no distension  Palpations: Abdomen is soft  There is no mass  Tenderness: There is no abdominal tenderness  Hernia: No hernia is present  Musculoskeletal:         General: No swelling or tenderness  Lymphadenopathy:      Cervical: No cervical adenopathy  Skin:     General: Skin is warm and dry  Coloration: Skin is not pale        Findings: "No rash  Neurological:      General: No focal deficit present  Mental Status: She is alert  Mental status is at baseline  She is disoriented  Motor: Weakness present  Gait: Gait abnormal       Comments: AAO to self  Minimally verbal, mumbles  Able to tell me her name, \"Barb\"    Psychiatric:         Mood and Affect: Mood normal          Behavior: Behavior normal          Pertinent Laboratory/Diagnostic Studies:  Reviewed in facility chart-stable     Current Medications   Medications reviewed and updated see facility [facility] for details  Current Outpatient Medications:   •  acetaminophen (TYLENOL) 325 mg tablet, Take 2 tablets (650 mg total) by mouth every 6 (six) hours as needed for mild pain, Disp: , Rfl:   •  haloperidol (HALDOL) 0 5 mg tablet, Take 1 tablet (0 5 mg total) by mouth every 2 (two) hours as needed for agitation, Disp: , Rfl:   •  Morphine Sulfate, Concentrate, 20 mg/mL concentrated solution, Take 0 25 mL (5 mg total) by mouth every hour as needed for moderate pain or severe pain Max Daily Amount: 120 mg, Disp: , Rfl: 0  •  scopolamine (TRANSDERM-SCOP) 1 mg/3 days TD 72 hr patch, Place 1 patch on the skin over 72 hours every third day, Disp: , Rfl:   •  senna-docusate sodium (SENOKOT S) 8 6-50 mg per tablet, Take 1 tablet by mouth daily, Disp: , Rfl:      Please note:  Voice-recognition software may have been used in the preparation of this document  Occasional wrong word or \"sound-alike\" substitutions may have occurred due to the inherent limitations of voice recognition software  Interpretation should be guided by context           HERNANDEZ Fox  6/15/2023 11:43 AM      "

## 2023-08-10 ENCOUNTER — NURSING HOME VISIT (OUTPATIENT)
Dept: GERIATRICS | Facility: OTHER | Age: 83
End: 2023-08-10
Payer: MEDICARE

## 2023-08-10 DIAGNOSIS — J98.4 CAVITARY LESION OF LUNG: ICD-10-CM

## 2023-08-10 DIAGNOSIS — Z51.5 HOSPICE CARE PATIENT: Primary | ICD-10-CM

## 2023-08-10 DIAGNOSIS — F02.B0 MODERATE LATE ONSET ALZHEIMER'S DEMENTIA WITHOUT BEHAVIORAL DISTURBANCE, PSYCHOTIC DISTURBANCE, MOOD DISTURBANCE, OR ANXIETY (HCC): ICD-10-CM

## 2023-08-10 DIAGNOSIS — R26.2 AMBULATORY DYSFUNCTION: ICD-10-CM

## 2023-08-10 DIAGNOSIS — G30.1 MODERATE LATE ONSET ALZHEIMER'S DEMENTIA WITHOUT BEHAVIORAL DISTURBANCE, PSYCHOTIC DISTURBANCE, MOOD DISTURBANCE, OR ANXIETY (HCC): ICD-10-CM

## 2023-08-10 PROCEDURE — 99349 HOME/RES VST EST MOD MDM 40: CPT | Performed by: FAMILY MEDICINE

## 2023-08-10 NOTE — PROGRESS NOTES
[address]  [phone]  [facility]  [address]      NAME: Kash Singh  AGE: 82 y.o. SEX: female [phone]    DATE OF ENCOUNTER: 8/24/2023    Assessment and Plan     1. Hospice care patient  - comfort measures  - pleasure feeds    2. Moderate late onset Alzheimer's dementia without behavioral disturbance, psychotic disturbance, mood disturbance, or anxiety (HCC)  - redirection, reorientation  - assistance with ADLs  - on Haloperidol 0.5 mg as needed    3. Cavitary lesion of lung  - doesn't need oxygen  - on hospice care  - cont Morphine as needed  - cont senna-colace as needed    4. Ambulatory dysfunction  - wheelchair mobility  - Fall precautions in place      - Counseling Documentation: patient was counseled regarding: prognosis    Chief Complaint     Routine Long term follow-up visit. History of Present Illness     HPI  Katy, a 81 y/o female is a long term resident at [facility], seen and examined, stable. She found sitting in the living room. She is unable to provide any history. She is minimally verbal, confused. Staff have no concerns at this time. She needs total assistance with ADLs. She is on hospice care. The following portions of the patient's history were reviewed and updated as appropriate: allergies, current medications, past family history, past medical history, past social history, past surgical history and problem list.    Review of Systems     Review of Systems   Unable to perform ROS: Dementia   As in HPI.     Active Problem List     Patient Active Problem List   Diagnosis   • Cavitary lesion of lung   • Depression   • Thyroid nodule   • Pulmonary nodule   • Leukocytosis   • Mycobacterium avium complex (HCC)   • Primary osteoarthritis of both first carpometacarpal joints   • Cough   • Impaired memory   • Ambulatory dysfunction   • Moderate late onset Alzheimer's dementia without behavioral disturbance, psychotic disturbance, mood disturbance, or anxiety ([facility])   • Urinary incontinence   • Slow transit constipation   • Hospice care patient       Objective     Vitals: T: 97.3, P: 62, R: 16, BP: 118/79, 96% on RA, Wt: 93.4 lbs    Physical Exam  Vitals and nursing note reviewed. Constitutional:       General: She is not in acute distress. Appearance: She is well-developed. She is not diaphoretic. Comments: Frail female   HENT:      Head: Normocephalic and atraumatic. Nose: Nose normal.      Mouth/Throat:      Mouth: Mucous membranes are moist.      Pharynx: Oropharynx is clear. No oropharyngeal exudate. Eyes:      General: No scleral icterus. Right eye: No discharge. Left eye: No discharge. Extraocular Movements: Extraocular movements intact. Conjunctiva/sclera: Conjunctivae normal.   Cardiovascular:      Rate and Rhythm: Normal rate and regular rhythm. Heart sounds: Normal heart sounds. No murmur heard. Pulmonary:      Effort: Pulmonary effort is normal. No respiratory distress. Breath sounds: Normal breath sounds. No wheezing. Chest:      Chest wall: No tenderness. Abdominal:      General: Bowel sounds are normal.      Palpations: Abdomen is soft. Tenderness: There is no abdominal tenderness. There is no guarding or rebound. Musculoskeletal:         General: No tenderness or deformity. Cervical back: Normal range of motion and neck supple. Right lower leg: No edema. Left lower leg: No edema. Comments: Restricted ROM due to debility   Skin:     General: Skin is warm and dry. Neurological:      Mental Status: She is alert. Comments: Oriented to self  Minimally verbal, not goal oriented  Able to maintain eye contact  Unable to follow any commands   Psychiatric:         Mood and Affect: Mood normal.      Comments: Pleasant, confused         Pertinent Laboratory/Diagnostic Studies:  Refer to facility chart.     Current Medications Medications reviewed and updated in facility chart.

## 2023-10-05 ENCOUNTER — NURSING HOME VISIT (OUTPATIENT)
Dept: GERIATRICS | Facility: OTHER | Age: 83
End: 2023-10-05
Payer: MEDICARE

## 2023-10-05 VITALS
DIASTOLIC BLOOD PRESSURE: 78 MMHG | RESPIRATION RATE: 16 BRPM | TEMPERATURE: 97.7 F | BODY MASS INDEX: 16.64 KG/M2 | WEIGHT: 91 LBS | SYSTOLIC BLOOD PRESSURE: 128 MMHG | HEART RATE: 72 BPM | OXYGEN SATURATION: 96 %

## 2023-10-05 DIAGNOSIS — F02.B0 MODERATE LATE ONSET ALZHEIMER'S DEMENTIA WITHOUT BEHAVIORAL DISTURBANCE, PSYCHOTIC DISTURBANCE, MOOD DISTURBANCE, OR ANXIETY (HCC): Primary | ICD-10-CM

## 2023-10-05 DIAGNOSIS — J98.4 CAVITARY LESION OF LUNG: ICD-10-CM

## 2023-10-05 DIAGNOSIS — G30.1 MODERATE LATE ONSET ALZHEIMER'S DEMENTIA WITHOUT BEHAVIORAL DISTURBANCE, PSYCHOTIC DISTURBANCE, MOOD DISTURBANCE, OR ANXIETY (HCC): Primary | ICD-10-CM

## 2023-10-05 DIAGNOSIS — Z51.5 HOSPICE CARE PATIENT: ICD-10-CM

## 2023-10-05 DIAGNOSIS — R62.7 ADULT FAILURE TO THRIVE: ICD-10-CM

## 2023-10-05 PROCEDURE — 99349 HOME/RES VST EST MOD MDM 40: CPT | Performed by: NURSE PRACTITIONER

## 2023-10-05 NOTE — ASSESSMENT & PLAN NOTE
· AAOx 1   · Minimally verbal, mumbles   · Appears more frail on exam today, does not make eye contact, attention is poor   · Needs 24/7 assistance and supervision  · Continue Hospice care  · Continue haldol prn   · Continue morphine prn  · Bowel regimen

## 2023-10-05 NOTE — PROGRESS NOTES
[address]  [phone]  [facility]  Code 13        NAME: Nabila Blood  AGE: 82 y.o. SEX: female CODE STATUS: Hospice    DATE OF ENCOUNTER: 10/5/2023    Assessment and Plan     1. Moderate late onset Alzheimer's dementia without behavioral disturbance, psychotic disturbance, mood disturbance, or anxiety (HCC)  Assessment & Plan:  · AAOx 1   · Minimally verbal, mumbles   · Appears more frail on exam today, does not make eye contact, attention is poor   · Needs 24/7 assistance and supervision  · Continue Hospice care  · Continue haldol prn   · Continue morphine prn  · Bowel regimen      2. Adult failure to thrive  Assessment & Plan:  · In setting of advancing Alzheimer's dementia  · Continue hospice cares  · Comfort care       3. Cavitary lesion of lung  Assessment & Plan:  · Lungs diminished but clear  · Occasional cough at times  · Stable on room air   · On hospice cares  · Continue Scopolamine patch      4. Hospice care patient  Assessment & Plan:  · Continue PRN morphine for pain/sob  · Continue Haldol prn for agitation/hallucinations  · Bowel regimen   · Continue scopolamine patch for secretions          All medications and routine orders were reviewed and updated as needed. Chief Complaint     LTC follow up visit     History of Present Illness     Ova Hoopa is a 82year old female, she is a LTC resident of [address] [place] [address] since April 2023. Past Medical Hx including but not limited to Advanced Dementia, Cavitary lesion of Lung (MAC), Depression seen in collaboration with nursing for medical mgmt and LTC follow up. Seen and examined at bedside today. Patient is a poor historian due to Alzheimer's/Dementia. History is obtained from review of records/ staff and her hospice aide who is present for exam. Lencho De Luna states sometimes she will respond but mostly non verbal, poor eater, drinks well.  Patient needs 24/7 care, assistance with ADLs/IADLs. She is now a full feed. She is no longer ambulating, is in w/c. She is on hospice services. Staff have no concerns at this time. The patient's allergies, past medical, surgical, social and family history were reviewed and unchanged. Review of Systems     Review of Systems   Unable to perform ROS: Dementia         Objective     Vitals:   Vitals:    10/05/23 1642   BP: 128/78   Pulse: 72   Resp: 16   Temp: 97.7 °F (36.5 °C)   SpO2: 96%         Physical Exam  Vitals and nursing note reviewed. Constitutional:       General: She is not in acute distress. Appearance: Normal appearance. HENT:      Head: Normocephalic and atraumatic. Nose: No congestion or rhinorrhea. Mouth/Throat:      Mouth: Mucous membranes are moist.   Eyes:      General: No scleral icterus. Conjunctiva/sclera: Conjunctivae normal.      Pupils: Pupils are equal, round, and reactive to light. Cardiovascular:      Rate and Rhythm: Normal rate and regular rhythm. Pulses: Normal pulses. Heart sounds: Normal heart sounds. No murmur heard. Pulmonary:      Effort: Pulmonary effort is normal. No respiratory distress. Breath sounds: Normal breath sounds. No wheezing, rhonchi or rales. Abdominal:      General: Bowel sounds are normal. There is no distension. Palpations: Abdomen is soft. There is no mass. Tenderness: There is no abdominal tenderness. Hernia: No hernia is present. Musculoskeletal:         General: No swelling or tenderness. Lymphadenopathy:      Cervical: No cervical adenopathy. Skin:     General: Skin is warm and dry. Coloration: Skin is not pale. Findings: No rash. Neurological:      General: No focal deficit present. Mental Status: She is alert. Mental status is at baseline. She is disoriented. Motor: Weakness present.       Gait: Gait abnormal.      Comments: AAO to self  Non verbal on exam   Does not make eye contact Does not follow commands    Psychiatric:         Mood and Affect: Mood normal.         Behavior: Behavior normal.         Pertinent Laboratory/Diagnostic Studies:  Reviewed in facility chart-stable     Current Medications   Medications reviewed and updated see facility [facility] for details. Current Outpatient Medications:   •  acetaminophen (TYLENOL) 325 mg tablet, Take 2 tablets (650 mg total) by mouth every 6 (six) hours as needed for mild pain, Disp: , Rfl:   •  haloperidol (HALDOL) 0.5 mg tablet, Take 1 tablet (0.5 mg total) by mouth every 2 (two) hours as needed for agitation, Disp: , Rfl:   •  Morphine Sulfate, Concentrate, 20 mg/mL concentrated solution, Take 0.25 mL (5 mg total) by mouth every hour as needed for moderate pain or severe pain Max Daily Amount: 120 mg, Disp: , Rfl: 0  •  scopolamine (TRANSDERM-SCOP) 1 mg/3 days TD 72 hr patch, Place 1 patch on the skin over 72 hours every third day, Disp: , Rfl:   •  senna-docusate sodium (SENOKOT S) 8.6-50 mg per tablet, Take 1 tablet by mouth daily, Disp: , Rfl:      Please note:  Voice-recognition software may have been used in the preparation of this document. Occasional wrong word or "sound-alike" substitutions may have occurred due to the inherent limitations of voice recognition software. Interpretation should be guided by context.          HERNANDEZ Baldwin  10/5/2023 4:51 PM

## 2023-10-05 NOTE — ASSESSMENT & PLAN NOTE
· Continue PRN morphine for pain/sob  · Continue Haldol prn for agitation/hallucinations  · Bowel regimen   · Continue scopolamine patch for secretions

## 2023-11-30 ENCOUNTER — NURSING HOME VISIT (OUTPATIENT)
Dept: GERIATRICS | Facility: OTHER | Age: 83
End: 2023-11-30
Payer: MEDICARE

## 2023-11-30 DIAGNOSIS — F02.B0 MODERATE LATE ONSET ALZHEIMER'S DEMENTIA WITHOUT BEHAVIORAL DISTURBANCE, PSYCHOTIC DISTURBANCE, MOOD DISTURBANCE, OR ANXIETY (HCC): ICD-10-CM

## 2023-11-30 DIAGNOSIS — R26.2 AMBULATORY DYSFUNCTION: ICD-10-CM

## 2023-11-30 DIAGNOSIS — R62.7 ADULT FAILURE TO THRIVE: Primary | ICD-10-CM

## 2023-11-30 DIAGNOSIS — G30.1 MODERATE LATE ONSET ALZHEIMER'S DEMENTIA WITHOUT BEHAVIORAL DISTURBANCE, PSYCHOTIC DISTURBANCE, MOOD DISTURBANCE, OR ANXIETY (HCC): ICD-10-CM

## 2023-11-30 PROCEDURE — 99349 HOME/RES VST EST MOD MDM 40: CPT | Performed by: FAMILY MEDICINE

## 2023-11-30 NOTE — PROGRESS NOTES
[address]  [phone]  [facility]  [address]      NAME: Renetta Varela  AGE: 82 y.o. SEX: female [id number]    DATE OF ENCOUNTER: 12/3/2023    Assessment and Plan     1. Adult failure to thrive  - on hospice care  - comfort measures  - assistance with feeding  - cont Morphine as needed for comfort  - cont Senna-docusate as needed    2. Ambulatory dysfunction  - wheelchair mobility  - Fall precautions in place    3. Moderate late onset Alzheimer's dementia without behavioral disturbance, psychotic disturbance, mood disturbance, or anxiety (HCC)  - redirection, reorientation  - total assistance with ADLs  - cont Haloperidol 0.5 mg as needed      - Counseling Documentation: patient was counseled regarding: prognosis    Chief Complaint     Routine Long term follow-up visit. History of Present Illness     LEW Burns, a 81 y/o female is a long term resident at [facility], seen and examined, stable. She found sitting in the family room with other residents. She says "yes", "No" and "okay". But she is unable to understand the conversation. She is pleasant, and smiles. Staff have no concerns at this time. She is progressively declining, is on hospice care. She needs max assistance with ADLs. The following portions of the patient's history were reviewed and updated as appropriate: allergies, current medications, past family history, past medical history, past social history, past surgical history and problem list.    Review of Systems     Review of Systems   Unable to perform ROS: Dementia   As in HPI.     Active Problem List     Patient Active Problem List   Diagnosis   • Cavitary lesion of lung   • Depression   • Thyroid nodule   • Pulmonary nodule   • Leukocytosis   • Mycobacterium avium complex (HCC)   • Primary osteoarthritis of both first carpometacarpal joints   • Cough   • Impaired memory   • Ambulatory dysfunction   • Moderate late onset Alzheimer's dementia without behavioral disturbance, psychotic disturbance, mood disturbance, or anxiety ([facility])   • Urinary incontinence   • Slow transit constipation   • Hospice care patient   • Adult failure to thrive       Objective     Vitals: T: 97.4, P: 71, R: 16, BP: 94/69, 91% on RA    Physical Exam  Vitals and nursing note reviewed. Constitutional:       General: She is not in acute distress. Appearance: She is well-developed. She is not diaphoretic. Comments: Thin built female   HENT:      Head: Normocephalic and atraumatic. Nose: Nose normal.      Mouth/Throat:      Mouth: Mucous membranes are moist.      Pharynx: Oropharynx is clear. No oropharyngeal exudate. Eyes:      General: No scleral icterus. Right eye: No discharge. Left eye: No discharge. Extraocular Movements: Extraocular movements intact. Conjunctiva/sclera: Conjunctivae normal.   Cardiovascular:      Rate and Rhythm: Normal rate and regular rhythm. Heart sounds: Normal heart sounds. No murmur heard. Pulmonary:      Effort: Pulmonary effort is normal. No respiratory distress. Breath sounds: Normal breath sounds. No wheezing. Chest:      Chest wall: No tenderness. Abdominal:      General: Bowel sounds are normal.      Palpations: Abdomen is soft. Tenderness: There is no abdominal tenderness. There is no guarding or rebound. Musculoskeletal:         General: No tenderness or deformity. Cervical back: Normal range of motion. Right lower leg: No edema. Left lower leg: No edema. Comments: Impaired ROM   Skin:     General: Skin is warm and dry. Neurological:      Mental Status: She is alert.       Comments: Responds to her name  Minimally verbal, not goal oriented  Pleasant, cooperative with exam  Wheelchair mobility   Psychiatric:         Behavior: Behavior normal.      Comments: Confused, pleasant         Pertinent Laboratory/Diagnostic Studies:  Refer to facility chart.    Current Medications   Medications reviewed and updated in facility chart.

## 2024-01-01 NOTE — ED PROVIDER NOTES
History  Chief Complaint   Patient presents with   • Hand Injury     Pt fell in bathroom Thursday, now has left hand swelling, -headstrike/loc/thinners     Patient is a 82-year-old female history of dementia that presents the emergency department with swelling to left hand for 2 days  Patient presents with patient son provides part of patient history  As per patient's son, patient had a witnessed ground-level fall at home with no head strike  Patient's son further reports that patient's left hand started to swell and was using it less per patient's son  Patient son brought patient to the emergency room in this evening for further evaluation of left hand swelling and pain  Patient denies palliative or provocative factors  Patient denies any effective treatment  Patient [place] fevers, chills, nausea, vomiting, diarrhea, constipation or urine symptoms  Patient has recent sick contacts recent travel  Patient affirms recent falls on account of her mention, ground-level on hardwood floor  Patient recent trauma  Patient denies chest pain, shortness of breath, and abdominal pain  History provided by:  Patient   used: No    Hand Injury  Associated symptoms: no back pain, no fever and no neck pain        Prior to Admission Medications   Prescriptions Last Dose Informant Patient Reported? Taking? ALPRAZolam (XANAX) 0 25 mg tablet   Yes No   Sig: Take 0 25 mg by mouth 3 (three) times a day as needed   Arnuity Ellipta 100 MCG/ACT AEPB inhaler   No No   Sig: INHALE 1 PUFF DAILY RINSE MOUTH AFTER USE     Cholecalciferol (VITAMIN D3 PO)   Yes No   Sig: Take by mouth   Coenzyme Q10 (COQ-10 PO)   Yes No   Sig: Take 200 mg by mouth daily     LUMIGAN 0 01 % ophthalmic drops   Yes No   Si DROP INTO BOTH EYES AT BEDTIME   Myrbetriq 50 MG TB24   Yes No   Sig: Take 1 tablet by mouth daily   Prolensa 0 07 % SOLN   Yes No   Sodium Fluoride 5000 PPM 1 1 % PSTE   Yes No   Sig: BRUSH 1-2 TIMES DAILY AS Routine guidance discussed with Mom and 2-month handout given.  Will give Pediarix, Hib, Prevnar 20 and RotaTeq today.  Great growth and development.  Next well exam at 4 months of age.     DIRECTED DO NOT RINSE AFTER USE   TURMERIC PO   Yes No   Sig: Take by mouth   Toviaz 8 MG TB24   Yes No   Sig: Take 1 tablet by mouth daily   azithromycin (ZITHROMAX) 250 mg tablet   No No   Sig: TAKE 1 TABLET (250 MG TOTAL) BY MOUTH EVERY 24 HOURS   benzonatate (TESSALON) 200 MG capsule   No No   Sig: Take 1 capsule (200 mg total) by mouth 3 (three) times a day as needed for cough   busPIRone (BUSPAR) 7 5 mg tablet   Yes No   docusate sodium (COLACE) 100 mg capsule   Yes No   Sig: Take 100 mg by mouth daily   dorzolamide-timolol (COSOPT) 22 3-6 8 MG/ML ophthalmic solution   Yes No   Sig: INSTILL 1 DROP INTO THE LEFT EYE THREE TIMES DAILY   escitalopram (LEXAPRO) 10 mg tablet   Yes No   ethambutol (MYAMBUTOL) 400 mg tablet   No No   Sig: TAKE 2 TABLETS BY MOUTH DAILY     fluticasone (FLONASE) 50 mcg/act nasal spray   Yes No   Si spray into each nostril daily at bedtime   guaiFENesin (MUCINEX) 600 mg 12 hr tablet   Yes No   Sig: Take 600 mg by mouth every 12 (twelve) hours   ipratropium (ATROVENT) 0 02 % nebulizer solution   No No   Sig: Take 2 5 mL (0 5 mg total) by nebulization 2 (two) times a day   lidocaine (LIDODERM) 5 %   No No   Sig: Apply 1 patch topically daily Remove & Discard patch within 12 hours or as directed by MD   magnesium 30 MG tablet   Yes No   Sig: Take 30 mg by mouth 2 (two) times a day   memantine (NAMENDA) 10 mg tablet   Yes No   memantine (NAMENDA) 5 mg tablet   Yes No   Sig: Take 5 mg by mouth 2 (two) times a day   rifampin (RIFADIN) 300 mg capsule   No No   Sig: Take 2 capsules (600 mg total) by mouth daily   rifampin (RIFADIN) 300 mg capsule   No No   Sig: Take 2 capsules (600 mg total) by mouth daily   timolol (TIMOPTIC-XE) 0 25 % ophthalmic gel-forming   Yes No   Sig: INSTILL 1 DROP INTO LEFT EYE EVERY MORNING   zinc gluconate 50 mg tablet   Yes No   Sig: Take 50 mg by mouth daily      Facility-Administered Medications: None       Past Medical History:   Diagnosis Date   • Lung nodule Past Surgical History:   Procedure Laterality Date   • [place] [address] INCL FLUOR GDNCE DX W/CELL [facility] SPX Right 7/16/2018    Procedure: Ron Mike;  Surgeon: Genna Block MD;  [facility]; Service: Pulmonary   • [place] [address] INCL FLUOR GDNCE DX W/CELL [facility] SPX N/A 12/17/2018    Procedure: Ron Mike;  Surgeon: Genna Block MD;  [facility]; Service: Pulmonary       Family History   Problem Relation Age of Onset   • Dementia Mother    • Hypertension Father    • No Known Problems Daughter    • No Known Problems Maternal Grandmother    • No Known Problems Maternal Grandfather    • No Known Problems Daughter      I have reviewed and agree with the history as documented  E-Cigarette/Vaping   • E-Cigarette Use Never User      E-Cigarette/Vaping Substances     Social History     Tobacco Use   • Smoking status: Never   • Smokeless tobacco: Never   Vaping Use   • Vaping Use: Never used   Substance Use Topics   • Alcohol use: No   • Drug use: No       Review of Systems   Constitutional: Negative for activity change, appetite change, chills and fever  HENT: Negative for congestion, ear pain, postnasal drip, rhinorrhea, sinus pressure, sinus pain, sore throat and tinnitus  Eyes: Negative for photophobia, pain and visual disturbance  Respiratory: Negative for cough, chest tightness and shortness of breath  Cardiovascular: Negative for chest pain and palpitations  Gastrointestinal: Negative for abdominal pain, constipation, diarrhea, nausea and vomiting  Genitourinary: Negative for difficulty urinating, dysuria, flank pain, frequency, hematuria and urgency  Musculoskeletal: Positive for arthralgias  Negative for back pain, gait problem, neck pain and neck stiffness  Skin: Negative for color change, pallor and rash  Allergic/Immunologic: Negative for environmental allergies and food allergies     Neurological: Negative for dizziness, seizures, syncope, weakness, numbness and headaches  Psychiatric/Behavioral: Negative for confusion  All other systems reviewed and are negative  Physical Exam  Physical Exam  Vitals and nursing note reviewed  Constitutional:       General: She is awake  Appearance: Normal appearance  She is well-developed and normal weight  She is not ill-appearing, toxic-appearing or diaphoretic  Comments: /62   Pulse 81   Temp 98 8 °F (37 1 °C) (Oral)   Resp 18   SpO2 100%      HENT:      Head: Normocephalic and atraumatic  Jaw: There is normal jaw occlusion  Right Ear: Hearing, tympanic membrane and external ear normal  No decreased hearing noted  No drainage, swelling or tenderness  No mastoid tenderness  Left Ear: Hearing, tympanic membrane and external ear normal  No decreased hearing noted  No drainage, swelling or tenderness  No mastoid tenderness  Nose: Nose normal       Mouth/Throat:      Lips: Pink  Mouth: Mucous membranes are moist       Pharynx: Oropharynx is clear  Uvula midline  Eyes:      General: Lids are normal  Vision grossly intact  Gaze aligned appropriately  Right eye: No discharge  Left eye: No discharge  Extraocular Movements: Extraocular movements intact  Conjunctiva/sclera: Conjunctivae normal       Pupils: Pupils are equal, round, and reactive to light  Neck:      Vascular: No JVD  Trachea: Trachea and phonation normal  No tracheal tenderness or tracheal deviation  Cardiovascular:      Rate and Rhythm: Normal rate and regular rhythm  Pulses: Normal pulses  Radial pulses are 2+ on the right side and 2+ on the left side  Posterior tibial pulses are 2+ on the right side and 2+ on the left side  Heart sounds: Normal heart sounds  Pulmonary:      Effort: Pulmonary effort is normal       Breath sounds: Normal breath sounds and air entry  No decreased breath sounds     Abdominal:      Palpations: Abdomen is not rigid    Musculoskeletal:         General: Normal range of motion  Right wrist: Normal       Left wrist: Normal       Right hand: Normal       Left hand: Swelling and tenderness present  Cervical back: Full passive range of motion without pain, normal range of motion and neck supple  No rigidity  No spinous process tenderness or muscular tenderness  Normal range of motion  Comments: Passive ROM intact  Upper and lower extremity 5/5 bilaterally  Neurovascularly intact  No grinding or clicking of joints    No snuff box tenderness bilaterally  Patient bilateral hands with abduction abduction and pincer grasp intact    Feet:      Right foot:      Toenail Condition: Right toenails are normal       Left foot:      Toenail Condition: Left toenails are normal    Lymphadenopathy:      Head:      Right side of head: No submental, submandibular, tonsillar, preauricular, posterior auricular or occipital adenopathy  Left side of head: No submental, submandibular, tonsillar, preauricular, posterior auricular or occipital adenopathy  Cervical: No cervical adenopathy  Right cervical: No superficial, deep or posterior cervical adenopathy  Left cervical: No superficial, deep or posterior cervical adenopathy  Skin:     General: Skin is warm  Capillary Refill: Capillary refill takes less than 2 seconds  Findings: No rash  Neurological:      General: No focal deficit present  Mental Status: She is alert and oriented to person, place, and time  Mental status is at baseline  GCS: GCS eye subscore is 4  GCS verbal subscore is 4  GCS motor subscore is 6  Cranial Nerves: Cranial nerves 2-12 are intact  Sensory: Sensation is intact  No sensory deficit  Motor: Motor function is intact  Coordination: Coordination is intact  Deep Tendon Reflexes: Reflexes are normal and symmetric        Reflex Scores:       Patellar reflexes are 2+ on the right side and 2+ on the left side  Psychiatric:         Attention and Perception: Attention normal          Mood and Affect: Mood normal          Speech: Speech normal          Behavior: Behavior normal  Behavior is cooperative  Thought Content: Thought content normal          Judgment: Judgment normal          Vital Signs  ED Triage Vitals   Temperature Pulse Respirations Blood Pressure SpO2   02/11/23 1802 02/11/23 1802 02/11/23 1802 02/11/23 1804 02/11/23 1802   98 8 °F (37 1 °C) 81 18 114/62 100 %      Temp Source Heart Rate Source Patient Position - Orthostatic VS BP Location FiO2 (%)   02/11/23 1802 02/11/23 1802 02/11/23 1802 -- --   Oral Monitor Sitting        Pain Score       --                  Vitals:    02/11/23 1802 02/11/23 1804   BP:  114/62   Pulse: 81    Patient Position - Orthostatic VS: Sitting          Visual Acuity      ED Medications  Medications   ketorolac (TORADOL) injection 15 mg (15 mg Intramuscular Given 2/11/23 2053)       Diagnostic Studies  Results Reviewed     None                 XR hand 3+ views LEFT    (Results Pending)              Procedures  Splint application    Date/Time: 2/11/2023 9:17 PM  Performed by: Rigo Hahn PA-C  Authorized by: Rigo Hahn PA-C   Universal Protocol:  Procedure performed by:  Consent: Verbal consent obtained  Risks and benefits: risks, benefits and alternatives were discussed  Consent given by: patient and guardian  Time out: Immediately prior to procedure a "time out" was called to verify the correct patient, procedure, equipment, support staff and site/side marked as required    Timeout called at: 2/11/2023 9:17 PM   Patient understanding: patient states understanding of the procedure being performed  Patient identity confirmed: verbally with patient and arm band      Pre-procedure details:     Sensation:  Normal    Skin color:  Pink  Procedure details:     Laterality:  Left    Location:  Hand    Hand:  L hand    Strapping: no  Cast type:  Short arm      Splint type:  Volar short arm    Supplies:  Ortho-Glass  Post-procedure details:     Pain:  Improved    Sensation:  Normal    Skin color:  Pink    Patient tolerance of procedure: Tolerated well, no immediate complications             ED Course                               SBIRT 20yo+    Flowsheet Row Most Recent Value   SBIRT (25 yo +)    In order to provide better care to our patients, we are screening all of our patients for alcohol and drug use  Would it be okay to ask you these screening questions? Unable to answer at this time Filed at: 02/11/2023 0127                    Medical Decision Making  Patient is a-year-old female history of dementia that presents the emergency department with swelling to left hand for 2 days  Patient presents with patient son provides part of patient history  As per patient's son, patient had a witnessed ground-level fall at home with no head strike  Patient hemodynamically stable and afebrile  Left hand xray with no acute osseous abnormality   ED technician applied left volar splint in the ED ; patient's visible left upper extremity distal phalanges symmetrical in coloration to right upper extremity distal phalanges, with capillary refill less than 2 seconds, patient with ability to wiggle left upper extremity phalanges, patient reports no numbness or tingling s/p splint placement    Patient verbalizes comfort of left hand s/p splint placement    Splint applied same day as encounter  Delivered Toradol patient reports decrease in left hand pain symptoms status post medication delivery  Left hand sprain versus strain  Follow-up with orthopedics as needed  Follow-up with PCP  From the emergency department should symptoms persist or exacerbate  Patient signed with probiotic setting of outpatient clinical imaging findings, discharge instructions, follow-up and verbalized agreement with patient current treatment plan with teach back    Hand sprain, left, initial encounter: acute illness or injury  Localized swelling on left hand: acute illness or injury  Amount and/or Complexity of Data Reviewed  Radiology: ordered  Decision-making details documented in ED Course  Risk  Prescription drug management  Disposition  Final diagnoses:   Localized swelling on left hand   Hand sprain, left, initial encounter     Time reflects when diagnosis was documented in both MDM as applicable and the Disposition within this note     Time User Action Codes Description Comment    2/11/2023  8:30 PM Jerral Lusty Add [R22 32] Localized swelling on left hand     2/11/2023  9:19 PM Jerral Lusty Add [Y02 29AV] Hand sprain, left, initial encounter       ED Disposition     ED Disposition   Discharge    Condition   Stable    Date/Time   Sat Feb 11, 2023  9:18 PM    [facility] discharge to home/self care                 Follow-up Information     Follow up With Specialties Details Why Contact Info Additional [address]   [address] [id number]  [address] , [place], [place], [address] Emergency Department Emergency Medicine   [address] [facility] [address] [facility] Emergency Department, [address], [place], [place], [address] [facility] [place] [facility]  As needed [address] [facility] [address] [phone] [address] [facility] [place], [address], [address] [place], [place], [address] [facility]          Discharge Medication List as of 2/11/2023  9:19 PM      CONTINUE these medications which have NOT CHANGED    Details   ALPRAZolam (XANAX) 0 25 mg tablet Take 0 25 mg by mouth 3 (three) times a day as needed, Starting Tue 4/28/2020, Historical Med      Arnuity Ellipta 100 MCG/ACT AEPB inhaler INHALE 1 PUFF DAILY RINSE MOUTH AFTER USE , Normal      azithromycin (ZITHROMAX) 250 mg tablet TAKE 1 TABLET (250 MG TOTAL) BY MOUTH EVERY 24 HOURS, Normal      benzonatate (TESSALON) 200 MG capsule Take 1 capsule (200 mg total) by mouth 3 (three) times a day as needed for cough, Starting Wed 9/22/2021, Normal      busPIRone (BUSPAR) 7 5 mg tablet Starting Fri 7/5/2019, Historical Med      Cholecalciferol (VITAMIN D3 PO) Take by mouth, Historical Med      Coenzyme Q10 (COQ-10 PO) Take 200 mg by mouth daily  , Historical Med      docusate sodium (COLACE) 100 mg capsule Take 100 mg by mouth daily, Historical Med      dorzolamide-timolol (COSOPT) 22 3-6 8 MG/ML ophthalmic solution INSTILL 1 DROP INTO THE LEFT EYE THREE TIMES DAILY, Historical Med      escitalopram (LEXAPRO) 10 mg tablet Starting Wed 9/15/2021, Historical Med      ethambutol (MYAMBUTOL) 400 mg tablet TAKE 2 TABLETS BY MOUTH DAILY , Normal      fluticasone (FLONASE) 50 mcg/act nasal spray 1 spray into each nostril daily at bedtime, Historical Med      guaiFENesin (MUCINEX) 600 mg 12 hr tablet Take 600 mg by mouth every 12 (twelve) hours, Historical Med      ipratropium (ATROVENT) 0 02 % nebulizer solution Take 2 5 mL (0 5 mg total) by nebulization 2 (two) times a day, Starting Fri 11/19/2021, Until Fri 11/4/2022, Normal      lidocaine (LIDODERM) 5 % Apply 1 patch topically daily Remove & Discard patch within 12 hours or as directed by MD, Starting Mon 10/28/2019, Normal      LUMIGAN 0 01 % ophthalmic drops 1 DROP INTO BOTH EYES AT BEDTIME, Historical Med      magnesium 30 MG tablet Take 30 mg by mouth 2 (two) times a day, Historical Med      memantine (NAMENDA) 10 mg tablet Starting Sat 12/11/2021, Historical Med      Myrbetriq 50 MG TB24 Take 1 tablet by mouth daily, Starting Tue 10/27/2020, Historical Med      Prolensa 0 07 % SOLN Starting Sun 7/25/2021, Historical Med      rifampin (RIFADIN) 300 mg capsule Take 2 capsules (600 mg total) by mouth daily, Starting Tue 1/17/2023, Until Mon 4/17/2023, Normal      Sodium Fluoride 5000 PPM 1 1 % PSTE BRUSH 1-2 TIMES DAILY AS DIRECTED DO NOT RINSE AFTER USE, Historical Med      timolol (TIMOPTIC-XE) 0 25 % ophthalmic gel-forming INSTILL 1 DROP INTO LEFT EYE EVERY MORNING, Historical Med      Toviaz 8 MG TB24 Take 1 tablet by mouth daily, Starting Mon 7/19/2021, Historical Med      TURMERIC PO Take by mouth, Historical Med      zinc gluconate 50 mg tablet Take 50 mg by mouth daily, Historical Med             No discharge procedures on file      PDMP Review     None          ED Provider  Electronically Signed by           Rehana Bajwa PA-C  02/12/23 0520

## 2024-01-04 ENCOUNTER — TELEPHONE (OUTPATIENT)
Age: 84
End: 2024-01-04

## 2024-01-04 NOTE — TELEPHONE ENCOUNTER
Spoke with Earlene at [facility] ([phone]) who advised PCP is required to sign certificate.    Advised PCP via TC.    While documenting this call, Amanda with [facility] returned call to verify that PCP should sign death certificate.

## 2024-01-04 NOTE — TELEPHONE ENCOUNTER
Left message for Amanda with [facility] to advise of electronically file death certificate EDRS [id number].    Asked to contact this office to confirm that hospice provider will be signing the death certificate.

## 2024-01-04 NOTE — TELEPHONE ENCOUNTER
Amanda from [facility] called Regarding patient  today at 1245pm. They sent the death certificate to  home.    Contact number for Amanda. [phone], [phone]    Please review

## 2024-01-04 NOTE — TELEPHONE ENCOUNTER
Hope with [facility] ([phone]) called to request provider sign the death certificate what was submitted electronically with EDRS [id number].     home reports date and time of death as 24 @ 1245 hours.

## 2024-01-08 NOTE — TELEPHONE ENCOUNTER
"Hope from [facility] ([phone]) called following up on signature of death certificate.    Death Certificate needs to be signed     Case # [id number]  Time of death:  12:45 pm  Date of death\"  2024    I advised Hope I will notify the provider.   "